# Patient Record
Sex: MALE | Race: WHITE | NOT HISPANIC OR LATINO | Employment: UNEMPLOYED | URBAN - METROPOLITAN AREA
[De-identification: names, ages, dates, MRNs, and addresses within clinical notes are randomized per-mention and may not be internally consistent; named-entity substitution may affect disease eponyms.]

---

## 2018-10-04 ENCOUNTER — HOSPITAL ENCOUNTER (OUTPATIENT)
Dept: RADIOLOGY | Facility: MEDICAL CENTER | Age: 48
End: 2018-10-04
Attending: SURGERY
Payer: COMMERCIAL

## 2018-10-04 DIAGNOSIS — C18.9 MALIGNANT NEOPLASM OF COLON, UNSPECIFIED PART OF COLON (HCC): ICD-10-CM

## 2018-10-04 PROCEDURE — 72197 MRI PELVIS W/O & W/DYE: CPT

## 2018-10-04 PROCEDURE — 700117 HCHG RX CONTRAST REV CODE 255: Performed by: SURGERY

## 2018-10-04 PROCEDURE — A9585 GADOBUTROL INJECTION: HCPCS | Performed by: SURGERY

## 2018-10-04 RX ORDER — GADOBUTROL 604.72 MG/ML
7.5 INJECTION INTRAVENOUS ONCE
Status: COMPLETED | OUTPATIENT
Start: 2018-10-04 | End: 2018-10-04

## 2018-10-04 RX ADMIN — GADOBUTROL 7.5 ML: 604.72 INJECTION INTRAVENOUS at 14:04

## 2019-07-07 ENCOUNTER — HOSPITAL ENCOUNTER (INPATIENT)
Facility: MEDICAL CENTER | Age: 49
LOS: 3 days | DRG: 390 | End: 2019-07-10
Attending: EMERGENCY MEDICINE | Admitting: HOSPITALIST
Payer: COMMERCIAL

## 2019-07-07 ENCOUNTER — HOSPITAL ENCOUNTER (OUTPATIENT)
Dept: RADIOLOGY | Facility: MEDICAL CENTER | Age: 49
End: 2019-07-07

## 2019-07-07 DIAGNOSIS — Z85.038 HISTORY OF COLON CANCER: ICD-10-CM

## 2019-07-07 DIAGNOSIS — Z93.2 ILEOSTOMY IN PLACE (HCC): ICD-10-CM

## 2019-07-07 DIAGNOSIS — R18.8 ABDOMINAL FLUID COLLECTION: ICD-10-CM

## 2019-07-07 DIAGNOSIS — K56.609 SBO (SMALL BOWEL OBSTRUCTION) (HCC): ICD-10-CM

## 2019-07-07 PROBLEM — E87.6 HYPOKALEMIA: Status: ACTIVE | Noted: 2019-07-07

## 2019-07-07 PROBLEM — D72.829 LEUKOCYTOSIS: Status: ACTIVE | Noted: 2019-07-07

## 2019-07-07 LAB
ALBUMIN SERPL BCP-MCNC: 3.7 G/DL (ref 3.2–4.9)
ALBUMIN/GLOB SERPL: 1.4 G/DL
ALP SERPL-CCNC: 115 U/L (ref 30–99)
ALT SERPL-CCNC: 61 U/L (ref 2–50)
ANION GAP SERPL CALC-SCNC: 6 MMOL/L (ref 0–11.9)
AST SERPL-CCNC: 46 U/L (ref 12–45)
BASOPHILS # BLD AUTO: 0.4 % (ref 0–1.8)
BASOPHILS # BLD: 0.05 K/UL (ref 0–0.12)
BILIRUB SERPL-MCNC: 1.7 MG/DL (ref 0.1–1.5)
BUN SERPL-MCNC: 11 MG/DL (ref 8–22)
CALCIUM SERPL-MCNC: 9 MG/DL (ref 8.5–10.5)
CHLORIDE SERPL-SCNC: 105 MMOL/L (ref 96–112)
CO2 SERPL-SCNC: 25 MMOL/L (ref 20–33)
CREAT SERPL-MCNC: 0.84 MG/DL (ref 0.5–1.4)
EKG IMPRESSION: NORMAL
EOSINOPHIL # BLD AUTO: 0.1 K/UL (ref 0–0.51)
EOSINOPHIL NFR BLD: 0.8 % (ref 0–6.9)
ERYTHROCYTE [DISTWIDTH] IN BLOOD BY AUTOMATED COUNT: 44.1 FL (ref 35.9–50)
GLOBULIN SER CALC-MCNC: 2.7 G/DL (ref 1.9–3.5)
GLUCOSE SERPL-MCNC: 108 MG/DL (ref 65–99)
HCT VFR BLD AUTO: 44.2 % (ref 42–52)
HGB BLD-MCNC: 14.8 G/DL (ref 14–18)
IMM GRANULOCYTES # BLD AUTO: 0.03 K/UL (ref 0–0.11)
IMM GRANULOCYTES NFR BLD AUTO: 0.2 % (ref 0–0.9)
LACTATE BLD-SCNC: 1.2 MMOL/L (ref 0.5–2)
LIPASE SERPL-CCNC: 42 U/L (ref 11–82)
LYMPHOCYTES # BLD AUTO: 0.96 K/UL (ref 1–4.8)
LYMPHOCYTES NFR BLD: 8 % (ref 22–41)
MAGNESIUM SERPL-MCNC: 1.9 MG/DL (ref 1.5–2.5)
MCH RBC QN AUTO: 30.1 PG (ref 27–33)
MCHC RBC AUTO-ENTMCNC: 33.5 G/DL (ref 33.7–35.3)
MCV RBC AUTO: 89.8 FL (ref 81.4–97.8)
MONOCYTES # BLD AUTO: 0.65 K/UL (ref 0–0.85)
MONOCYTES NFR BLD AUTO: 5.4 % (ref 0–13.4)
NEUTROPHILS # BLD AUTO: 10.28 K/UL (ref 1.82–7.42)
NEUTROPHILS NFR BLD: 85.2 % (ref 44–72)
NRBC # BLD AUTO: 0 K/UL
NRBC BLD-RTO: 0 /100 WBC
PLATELET # BLD AUTO: 249 K/UL (ref 164–446)
PMV BLD AUTO: 9 FL (ref 9–12.9)
POTASSIUM SERPL-SCNC: 4.1 MMOL/L (ref 3.6–5.5)
PROT SERPL-MCNC: 6.4 G/DL (ref 6–8.2)
RBC # BLD AUTO: 4.92 M/UL (ref 4.7–6.1)
SODIUM SERPL-SCNC: 136 MMOL/L (ref 135–145)
WBC # BLD AUTO: 12.1 K/UL (ref 4.8–10.8)

## 2019-07-07 PROCEDURE — 700111 HCHG RX REV CODE 636 W/ 250 OVERRIDE (IP): Performed by: INTERNAL MEDICINE

## 2019-07-07 PROCEDURE — 304538 HCHG NG TUBE

## 2019-07-07 PROCEDURE — 93005 ELECTROCARDIOGRAM TRACING: CPT | Performed by: INTERNAL MEDICINE

## 2019-07-07 PROCEDURE — 83735 ASSAY OF MAGNESIUM: CPT

## 2019-07-07 PROCEDURE — 700105 HCHG RX REV CODE 258: Performed by: EMERGENCY MEDICINE

## 2019-07-07 PROCEDURE — 93010 ELECTROCARDIOGRAM REPORT: CPT | Mod: GZ | Performed by: INTERNAL MEDICINE

## 2019-07-07 PROCEDURE — 700111 HCHG RX REV CODE 636 W/ 250 OVERRIDE (IP): Performed by: STUDENT IN AN ORGANIZED HEALTH CARE EDUCATION/TRAINING PROGRAM

## 2019-07-07 PROCEDURE — 700105 HCHG RX REV CODE 258: Performed by: INTERNAL MEDICINE

## 2019-07-07 PROCEDURE — 36415 COLL VENOUS BLD VENIPUNCTURE: CPT

## 2019-07-07 PROCEDURE — 85025 COMPLETE CBC W/AUTO DIFF WBC: CPT

## 2019-07-07 PROCEDURE — 700101 HCHG RX REV CODE 250: Performed by: STUDENT IN AN ORGANIZED HEALTH CARE EDUCATION/TRAINING PROGRAM

## 2019-07-07 PROCEDURE — 80053 COMPREHEN METABOLIC PANEL: CPT

## 2019-07-07 PROCEDURE — 83605 ASSAY OF LACTIC ACID: CPT

## 2019-07-07 PROCEDURE — 83690 ASSAY OF LIPASE: CPT

## 2019-07-07 PROCEDURE — 700101 HCHG RX REV CODE 250: Performed by: INTERNAL MEDICINE

## 2019-07-07 PROCEDURE — 99285 EMERGENCY DEPT VISIT HI MDM: CPT

## 2019-07-07 PROCEDURE — 770006 HCHG ROOM/CARE - MED/SURG/GYN SEMI*

## 2019-07-07 PROCEDURE — 700111 HCHG RX REV CODE 636 W/ 250 OVERRIDE (IP): Performed by: EMERGENCY MEDICINE

## 2019-07-07 PROCEDURE — 99223 1ST HOSP IP/OBS HIGH 75: CPT | Mod: GC | Performed by: HOSPITALIST

## 2019-07-07 PROCEDURE — 96374 THER/PROPH/DIAG INJ IV PUSH: CPT

## 2019-07-07 RX ORDER — SODIUM CHLORIDE 9 MG/ML
1000 INJECTION, SOLUTION INTRAVENOUS ONCE
Status: COMPLETED | OUTPATIENT
Start: 2019-07-07 | End: 2019-07-07

## 2019-07-07 RX ORDER — POLYETHYLENE GLYCOL 3350 17 G/17G
1 POWDER, FOR SOLUTION ORAL
Status: DISCONTINUED | OUTPATIENT
Start: 2019-07-07 | End: 2019-07-07

## 2019-07-07 RX ORDER — SODIUM CHLORIDE AND POTASSIUM CHLORIDE 150; 900 MG/100ML; MG/100ML
INJECTION, SOLUTION INTRAVENOUS CONTINUOUS
Status: DISCONTINUED | OUTPATIENT
Start: 2019-07-07 | End: 2019-07-08

## 2019-07-07 RX ORDER — MORPHINE SULFATE 4 MG/ML
3 INJECTION, SOLUTION INTRAMUSCULAR; INTRAVENOUS EVERY 4 HOURS PRN
Status: DISCONTINUED | OUTPATIENT
Start: 2019-07-07 | End: 2019-07-07

## 2019-07-07 RX ORDER — AMOXICILLIN 250 MG
2 CAPSULE ORAL 2 TIMES DAILY
Status: DISCONTINUED | OUTPATIENT
Start: 2019-07-07 | End: 2019-07-07

## 2019-07-07 RX ORDER — ACETAMINOPHEN 500 MG
1000 TABLET ORAL
Status: ON HOLD | COMMUNITY
End: 2019-07-10

## 2019-07-07 RX ORDER — MORPHINE SULFATE 4 MG/ML
3 INJECTION, SOLUTION INTRAMUSCULAR; INTRAVENOUS
Status: DISCONTINUED | OUTPATIENT
Start: 2019-07-07 | End: 2019-07-10 | Stop reason: HOSPADM

## 2019-07-07 RX ORDER — METOCLOPRAMIDE HYDROCHLORIDE 5 MG/ML
10 INJECTION INTRAMUSCULAR; INTRAVENOUS ONCE
Status: COMPLETED | OUTPATIENT
Start: 2019-07-07 | End: 2019-07-07

## 2019-07-07 RX ORDER — GABAPENTIN 300 MG/1
300 CAPSULE ORAL 3 TIMES DAILY
COMMUNITY
End: 2019-07-07

## 2019-07-07 RX ORDER — BISACODYL 10 MG
10 SUPPOSITORY, RECTAL RECTAL
Status: DISCONTINUED | OUTPATIENT
Start: 2019-07-07 | End: 2019-07-07

## 2019-07-07 RX ORDER — MORPHINE SULFATE 4 MG/ML
4 INJECTION, SOLUTION INTRAMUSCULAR; INTRAVENOUS ONCE
Status: COMPLETED | OUTPATIENT
Start: 2019-07-07 | End: 2019-07-07

## 2019-07-07 RX ORDER — ACETAMINOPHEN 325 MG/1
650 TABLET ORAL EVERY 6 HOURS PRN
Status: DISCONTINUED | OUTPATIENT
Start: 2019-07-07 | End: 2019-07-10 | Stop reason: HOSPADM

## 2019-07-07 RX ORDER — HEPARIN SODIUM 5000 [USP'U]/ML
5000 INJECTION, SOLUTION INTRAVENOUS; SUBCUTANEOUS EVERY 8 HOURS
Status: DISCONTINUED | OUTPATIENT
Start: 2019-07-07 | End: 2019-07-08

## 2019-07-07 RX ORDER — ONDANSETRON 2 MG/ML
4 INJECTION INTRAMUSCULAR; INTRAVENOUS EVERY 4 HOURS PRN
Status: DISCONTINUED | OUTPATIENT
Start: 2019-07-07 | End: 2019-07-10 | Stop reason: HOSPADM

## 2019-07-07 RX ADMIN — METRONIDAZOLE 500 MG: 500 INJECTION, SOLUTION INTRAVENOUS at 21:34

## 2019-07-07 RX ADMIN — ONDANSETRON 4 MG: 2 INJECTION INTRAMUSCULAR; INTRAVENOUS at 16:03

## 2019-07-07 RX ADMIN — POTASSIUM CHLORIDE AND SODIUM CHLORIDE: 900; 150 INJECTION, SOLUTION INTRAVENOUS at 23:06

## 2019-07-07 RX ADMIN — MORPHINE SULFATE 3 MG: 4 INJECTION INTRAVENOUS at 15:22

## 2019-07-07 RX ADMIN — CEFTRIAXONE SODIUM 2 G: 2 INJECTION, POWDER, FOR SOLUTION INTRAMUSCULAR; INTRAVENOUS at 13:55

## 2019-07-07 RX ADMIN — HEPARIN SODIUM 5000 UNITS: 5000 INJECTION, SOLUTION INTRAVENOUS; SUBCUTANEOUS at 13:55

## 2019-07-07 RX ADMIN — MORPHINE SULFATE 3 MG: 4 INJECTION INTRAVENOUS at 21:31

## 2019-07-07 RX ADMIN — MORPHINE SULFATE 3 MG: 4 INJECTION INTRAVENOUS at 12:22

## 2019-07-07 RX ADMIN — SODIUM CHLORIDE 1000 ML: 9 INJECTION, SOLUTION INTRAVENOUS at 08:21

## 2019-07-07 RX ADMIN — METRONIDAZOLE 500 MG: 500 INJECTION, SOLUTION INTRAVENOUS at 14:36

## 2019-07-07 RX ADMIN — METOCLOPRAMIDE 10 MG: 5 INJECTION, SOLUTION INTRAMUSCULAR; INTRAVENOUS at 23:06

## 2019-07-07 RX ADMIN — POTASSIUM CHLORIDE AND SODIUM CHLORIDE: 900; 150 INJECTION, SOLUTION INTRAVENOUS at 11:42

## 2019-07-07 RX ADMIN — MORPHINE SULFATE 4 MG: 4 INJECTION INTRAVENOUS at 08:09

## 2019-07-07 RX ADMIN — HEPARIN SODIUM 5000 UNITS: 5000 INJECTION, SOLUTION INTRAVENOUS; SUBCUTANEOUS at 21:34

## 2019-07-07 ASSESSMENT — PATIENT HEALTH QUESTIONNAIRE - PHQ9
2. FEELING DOWN, DEPRESSED, IRRITABLE, OR HOPELESS: NOT AT ALL
1. LITTLE INTEREST OR PLEASURE IN DOING THINGS: NOT AT ALL
SUM OF ALL RESPONSES TO PHQ9 QUESTIONS 1 AND 2: 0

## 2019-07-07 ASSESSMENT — LIFESTYLE VARIABLES: DO YOU DRINK ALCOHOL: NO

## 2019-07-07 ASSESSMENT — COGNITIVE AND FUNCTIONAL STATUS - GENERAL
SUGGESTED CMS G CODE MODIFIER DAILY ACTIVITY: CH
SUGGESTED CMS G CODE MODIFIER MOBILITY: CH
DAILY ACTIVITIY SCORE: 24
MOBILITY SCORE: 24

## 2019-07-07 ASSESSMENT — ENCOUNTER SYMPTOMS
VOMITING: 1
BLURRED VISION: 0
PSYCHIATRIC NEGATIVE: 1
CHILLS: 0
DOUBLE VISION: 0
COUGH: 0
SPUTUM PRODUCTION: 0
FEVER: 0
ORTHOPNEA: 0
TINGLING: 0
PHOTOPHOBIA: 0
HEADACHES: 0
BLOOD IN STOOL: 0
SHORTNESS OF BREATH: 0
PALPITATIONS: 0
ABDOMINAL PAIN: 1
DIARRHEA: 0
DIAPHORESIS: 1
WEIGHT LOSS: 0
HEARTBURN: 0
MYALGIAS: 0
DIZZINESS: 0
CHILLS: 1
NAUSEA: 1
BACK PAIN: 0

## 2019-07-07 NOTE — PROGRESS NOTES
2 RN Skin Check with DAVIDSON Yanez.   Ileostomy site intact, securement device in place-no signs of skin breakdown underneath.   Heels are dry, closed, blanchable.   Respiratory site inspection negative for skin breakdown or redness.  NG tube in place to R nare, secured with tape. No signs of skin breakdown.   Pt moves self independently in bed, independent at baseline.

## 2019-07-07 NOTE — CARE PLAN
Problem: Safety  Goal: Will remain free from injury  Outcome: PROGRESSING AS EXPECTED  Pt steady on his feet, in a lot of pain and receiving pain medications. Standby assist oob. NG tube in place and IVF infusing.      Problem: Bowel/Gastric:  Goal: Normal bowel function is maintained or improved  Outcome: PROGRESSING SLOWER THAN EXPECTED  Pt has NG tube in place for partial SBO. NPO at this time.

## 2019-07-07 NOTE — ED NOTES
The Medication Reconciliation process has been completed by interviewing the patient    Allergies have been reviewed  Antibiotic use in 30 days - none    [facility]  [facility]

## 2019-07-07 NOTE — ED TRIAGE NOTES
Chief Complaint   Patient presents with   • Abdominal Pain     Transfer from [facility] with diagosis of small bowel obstruction

## 2019-07-07 NOTE — PROGRESS NOTES
Received report from DAVIDSON Jewell. Pt settled into room, VSS. Pt c/o severe abd pain. IM [facility] team called for pain medications, awaiting orders. Pt started on NS with KCL 20 meq at 83 mL/hr. Ng tube to LWS with slight bile colored minimal output in line. Pt refusing assessmend and skin assessment at this time until pain controlled. Bed in lowest locked position, fall precautions in place, call bell within reach and used properly.

## 2019-07-07 NOTE — H&P
Internal Medicine Admitting History and Physical    Note Author: Kirti Wright M.D.       Name Beau Mcintyre 1970   Age/Sex 48 y.o. male   MRN [id number]   Code Status FULL     After 5PM or if no immediate response to page, please call for cross-coverage  Attending/Team: Dr. Vera/ALYSIA See Patient List for primary contact information  Call [phone] to page    1st Call - Day Intern (R1):   Dr. Wright 2nd Call - Day Sr. Resident (R2/R3):   LISSETTE Boone       Chief Complaint:   Abdominal pain    HPI:  Mr. Mcintyre is a 49 yo M with PMH of Colorectal carcinoma s/p Complete Colectomy with Ileostomy. On , patient noticed that his Ileostomy bag was not filling up (Normally the bag gets full in 2-3 hours, but it did not fill up at all in 10 hours). By evening he developed abdominal pain at the level of umbilicus in left lower and right lower quadrants. The pain was 10/10 in intensity, started as dull and became sharp, consistently present. It did not radiate. It was associated with severe bloating( the patient said his belly swelled up a lot). It was also associated with multiple episodes of vomiting- clear yellow liquid, no hematemesis, blood clots.  In the night, the patient also developed chills and diaphoresis. Of note, patient had been advised against consumption of almonds, but forgot and consumed some.  Patient went to [facility] where his white count was found to be 15.3, his K of 3, Anion gap 19, mildly elevated LFTs. His CT abdo and pelvis showed Small bowel obstruction with transition point in te presacral region, where there is eccentric small bowel thickening and intense contrast enhancement, immediately anterior to a thick wall and intensely peripherally contrast-enhancing fluid collection, possibly abscess or necrotic tumor, which itself is immediately anterior to the sacrum at S2, S3, S4. Patient was given morphine which brought his pain down to 6-7/10 and one dose of IV  Zosyn. Patient was referred to [facility] for further management.    Review of Systems   Constitutional: Positive for chills, diaphoresis and malaise/fatigue. Negative for weight loss.   HENT: Negative for ear pain, hearing loss and tinnitus.    Eyes: Negative for blurred vision, double vision and photophobia.   Respiratory: Negative for cough, sputum production and shortness of breath.    Cardiovascular: Negative for chest pain, palpitations, orthopnea and leg swelling.   Gastrointestinal: Positive for abdominal pain, nausea and vomiting. Negative for diarrhea and heartburn.   Genitourinary: Negative for dysuria, frequency and urgency.   Musculoskeletal: Negative for myalgias.   Neurological: Negative for dizziness, tingling and headaches.   Psychiatric/Behavioral: Negative.              Past Medical History (Chronic medical problem, known complications and current treatment)      Past Surgical History:  Past Surgical History:   Procedure Laterality Date   • HYDROCELECTOMY ADULT     • OTHER ABDOMINAL SURGERY      Complete colectomy       Current Outpatient Medications:  Home Medications     Reviewed by Kathleen Moss (Pharmacy Tech) on 07/07/19 at 0838  Med List Status: Complete   Medication Last Dose Status   acetaminophen (TYLENOL) 500 MG Tab 7/6/2019 Active                Medication Allergy/Sensitivities:  Allergies   Allergen Reactions   • Hydrocodone Hives     Hives - all over body   • Tape Rash     Rash and blistering with prolonged exposure  Some tape causes immediate rash, burning         Family History (mandatory)   Family History   Problem Relation Age of Onset   • Colon Cancer Mother    • Cancer Mother         Hepatocellular cancer       Social History (mandatory)   Social History     Social History   • Marital status: Single     Spouse name: N/A   • Number of children: N/A   • Years of education: N/A     Occupational History   • Not on file.     Social History Main Topics   • Smoking status: Former  Smoker     Packs/day: 1.00     Types: Cigarettes     Quit date: 7/7/1994   • Smokeless tobacco: Never Used   • Alcohol use Not on file   • Drug use: Unknown   • Sexual activity: Not on file     Other Topics Concern   • Not on file     Social History Narrative   • No narrative on file     PCP : Pcp Pt States None    Physical Exam     Vitals:    07/07/19 1000 07/07/19 1140 07/07/19 1745 07/07/19 2015   BP:  115/72 130/81 106/75   Pulse: 82 87 80 95   Resp: 12 17 17 16   Temp:  37.2 °C (99 °F) 37.1 °C (98.8 °F) 36.7 °C (98.1 °F)   TempSrc:  Temporal Temporal Temporal   SpO2: 91% 93% 93% 99%   Weight:       Height:         Body mass index is 27.67 kg/m².  /75   Pulse 95   Temp 36.7 °C (98.1 °F) (Temporal)   Resp 16   Ht 1.829 m (6')   Wt 92.5 kg (204 lb)   SpO2 99%   BMI 27.67 kg/m²   O2 therapy: Pulse Oximetry: 99 %, O2 (LPM): 0, O2 Delivery: None (Room Air)    Physical Exam   Constitutional: He is oriented to person, place, and time. He appears distressed.   HENT:   Head: Normocephalic and atraumatic.   Eyes: Pupils are equal, round, and reactive to light. Conjunctivae and EOM are normal. No scleral icterus.   Neck: Normal range of motion. Neck supple. No JVD present. No tracheal deviation present. No thyromegaly present.   Cardiovascular: Normal rate, regular rhythm, normal heart sounds and intact distal pulses.  Exam reveals no gallop and no friction rub.    No murmur heard.  Pulmonary/Chest: Effort normal and breath sounds normal. No respiratory distress. He has no wheezes. He has no rales.   Abdominal: Soft. He exhibits distension. He exhibits no mass. There is no tenderness. There is no rebound and no guarding.   Bowel sounds are hypoactive   Musculoskeletal: Normal range of motion. He exhibits no edema.   Neurological: He is alert and oriented to person, place, and time.   Skin: Skin is warm.   Psychiatric: Mood and affect normal.         Data Review       Old Records Request:   Deferred  Current  Records review/summary: Completed    Lab Data Review:  Recent Results (from the past 24 hour(s))   CBC WITH DIFFERENTIAL    Collection Time: 07/07/19  7:15 AM   Result Value Ref Range    WBC 12.1 (H) 4.8 - 10.8 K/uL    RBC 4.92 4.70 - 6.10 M/uL    Hemoglobin 14.8 14.0 - 18.0 g/dL    Hematocrit 44.2 42.0 - 52.0 %    MCV 89.8 81.4 - 97.8 fL    MCH 30.1 27.0 - 33.0 pg    MCHC 33.5 (L) 33.7 - 35.3 g/dL    RDW 44.1 35.9 - 50.0 fL    Platelet Count 249 164 - 446 K/uL    MPV 9.0 9.0 - 12.9 fL    Neutrophils-Polys 85.20 (H) 44.00 - 72.00 %    Lymphocytes 8.00 (L) 22.00 - 41.00 %    Monocytes 5.40 0.00 - 13.40 %    Eosinophils 0.80 0.00 - 6.90 %    Basophils 0.40 0.00 - 1.80 %    Immature Granulocytes 0.20 0.00 - 0.90 %    Nucleated RBC 0.00 /100 WBC    Neutrophils (Absolute) 10.28 (H) 1.82 - 7.42 K/uL    Lymphs (Absolute) 0.96 (L) 1.00 - 4.80 K/uL    Monos (Absolute) 0.65 0.00 - 0.85 K/uL    Eos (Absolute) 0.10 0.00 - 0.51 K/uL    Baso (Absolute) 0.05 0.00 - 0.12 K/uL    Immature Granulocytes (abs) 0.03 0.00 - 0.11 K/uL    NRBC (Absolute) 0.00 K/uL   COMP METABOLIC PANEL    Collection Time: 07/07/19  7:15 AM   Result Value Ref Range    Sodium 136 135 - 145 mmol/L    Potassium 4.1 3.6 - 5.5 mmol/L    Chloride 105 96 - 112 mmol/L    Co2 25 20 - 33 mmol/L    Anion Gap 6.0 0.0 - 11.9    Glucose 108 (H) 65 - 99 mg/dL    Bun 11 8 - 22 mg/dL    Creatinine 0.84 0.50 - 1.40 mg/dL    Calcium 9.0 8.5 - 10.5 mg/dL    AST(SGOT) 46 (H) 12 - 45 U/L    ALT(SGPT) 61 (H) 2 - 50 U/L    Alkaline Phosphatase 115 (H) 30 - 99 U/L    Total Bilirubin 1.7 (H) 0.1 - 1.5 mg/dL    Albumin 3.7 3.2 - 4.9 g/dL    Total Protein 6.4 6.0 - 8.2 g/dL    Globulin 2.7 1.9 - 3.5 g/dL    A-G Ratio 1.4 g/dL   LIPASE    Collection Time: 07/07/19  7:15 AM   Result Value Ref Range    Lipase 42 11 - 82 U/L   ESTIMATED GFR    Collection Time: 07/07/19  7:15 AM   Result Value Ref Range    GFR If African American >60 >60 mL/min/1.73 m 2    GFR If Non African American >60  >60 mL/min/1.73 m 2   MAGNESIUM    Collection Time: 19  7:15 AM   Result Value Ref Range    Magnesium 1.9 1.5 - 2.5 mg/dL   LACTIC ACID    Collection Time: 19 11:33 AM   Result Value Ref Range    Lactic Acid 1.2 0.5 - 2.0 mmol/L   EKG    Collection Time: 19  2:47 PM   Result Value Ref Range    Report       [facility]    Test Date:  2019  Pt Name:    ASTON MARTINEZ               Department: 141  MRN:        [id number]                      Room:       T4  Gender:     Male                         Technician: MAITE  :        1970                   Requested By:ZAHRA PEGUERO  Order #:    [id number]                    Reading MD: Beau Zimmerman MD    Measurements  Intervals                                Axis  Rate:       96                           P:          49  GA:         149                          QRS:        53  QRSD:       87                           T:          62  QT:         333  QTc:        421    Interpretive Statements  SINUS RHYTHM  No previous ECG available for comparison    Electronically Signed On 2019 16:27:19 PDT by Beau Zimmerman MD         Imaging/Procedures Review:    Independant Imaging Review: Completed  OUTSIDE IMAGES-CT ABDOMEN /PELVIS   Final Result       Small bowel obstruction with transition point in te presacral region, where there is eccentric small bowel thickening and intense contrast enhancement, immediately anterior to a thick wall and intensely peripherally contrast-enhancing fluid collection, possibly abscess or necrotic tumor, which itself is immediately anterior to the sacrum at S2, S3, S4.       EKG:   EKG Independent Review: Completed  QTc:412, HR: 96, Normal Sinus Rhythm, no ST/T changes    Records reviewed and summarized in current documentation :  Yes  [facility] teaching service handout given to patient:  No         Assessment/Plan     Small bowel obstruction (HCC)   Assessment & Plan    2/2 ? Adhesions. Other causes could be Foreign body  obstruction 2/2 almonds, or an infectious etiology leading to inflammatory changes.  Patient presented with acute abdomen, nil drainage in ileostomy bag, vomiting and Leukocytosis with neutrophilic preponderance. His CT abdomen showed small bowel obstruction. His lactic acid was normal at 1.2, indicating that intestinal ischemic changes are less likely.    Plan:  -NPO  -NG tube for decompression  -Hydration and electrolyte repletion with NS 0.9% with KCl 20MEq  -IV Zofran prn for nausea  -IV morphine 3 mg q2h PRN for pain  -IV ceftriaxone-flagyl for possible infectious cause  -Surgical consult: Non operative management. Dr. Garcia to see the patient tomorrow.  -Will call surgery if patient deteriorates             Leukocytosis   Assessment & Plan    WBC count was 15 at [facility]. In the Ed, it is 12.1 with ANC 10.28. An infectious etiology for the SBO cannot be ruled out. However, patient did not fulfill SIRS criteria in ER.      -Patient received 1 dose Zosyn before transfer  -Started Ceftriaxone-flagyl on admission       Hypokalemia   Assessment & Plan    Patient had a K of 3 at [facility] likely 2/2 excessive vomiting and poor oral intake.He was given zofran and NS.  On admission, his K is 4.1.    Plan- On NS 0.9% with KCl 20MEq  Will continue to monitor and replete K if needed.         Anticipated Hospital stay:  >2 midnights        Quality Measures  Quality-Core Measures   Reviewed items::  EKG reviewed, Labs reviewed, Medications reviewed and Radiology images reviewed  Olivas catheter::  No Olivas  DVT prophylaxis pharmacological::  Heparin  Ulcer Prophylaxis::  No    PCP: Pcp Pt States None

## 2019-07-07 NOTE — ED PROVIDER NOTES
ED Provider Note    ED Provider Note    Primary care provider: Pcp Pt States None  Means of arrival: EMS, transfer  History obtained from: patient  History limited by: None    CHIEF COMPLAINT  Chief Complaint   Patient presents with   • Abdominal Pain     Transfer from [facility] with diagosis of small bowel obstruction       HPI  Beau Mcintyre is a 48 y.o. male who presents to the Emergency Department via transfer from [place] with abdominal pain.  Work-up at outlying facility reveals a small bowel obstruction.  She has a long history of dealing with intestinal cancers.  First diagnosed in 2003 with colorectal cancer and reports that surgical resection at that time.  Most recent surgery was in 2015, Dr. Rita Acosta, removed his large colon and he now has an ileostomy.  He states that on Saturday, he did not experience any pain but noted decreased output from his ileostomy.  Sunday morning, he woke up with pain.  He does not report a fever.  It feels similar to symptoms he had in the past related to small bowel obstructions.  Outlying facility, patient was treated with pain medication Zofran and Zosyn.  He was noted to have an elevated white blood cell count of 15.  H&H 16 and 50.  Normal platelet count.  Chemistry is significant for a low potassium 3.0.  Anion gap 19 with glucose of 126.  AST and ALT 71 and 94 respectively.  Alkaline phosphatase was elevated at 167.  Urine was unrevealing.  CT scan of the abdomen and pelvis with contrast shows a small bowel obstruction with a transition point in the presacral region, where there is eccentric small bowel wall thickening and intense contrast enhancement.  Anterior to this is a thick walled contrast-enhancing fluid collection concerning for possible abscess or necrotic tumor.    REVIEW OF SYSTEMS  Review of Systems   Constitutional: Positive for diaphoresis. Negative for chills and fever.   HENT: Negative for congestion.    Respiratory: Negative for  shortness of breath.    Cardiovascular: Negative for chest pain.   Gastrointestinal: Positive for abdominal pain, nausea and vomiting. Negative for blood in stool.   Genitourinary: Negative for dysuria.   Musculoskeletal: Negative for back pain.   Neurological: Negative for headaches.   All other systems reviewed and are negative.      PAST MEDICAL HISTORY   Colorectal cancer    SURGICAL HISTORY  Resection of colorectal cancer, large bowel resection.  Ileostomy    SOCIAL HISTORY  Social History   Substance Use Topics   • Smoking status: Not on file   • Smokeless tobacco: Not on file   • Alcohol use Not on file      History   Drug use: Unknown       FAMILY HISTORY  Patient reports a strong history of colorectal cancers and in family including his mother and cousins.    CURRENT MEDICATIONS  Home Medications     Reviewed by Kathleen Moss (Pharmacy Tech) on 07/07/19 at 0838  Med List Status: Complete   Medication Last Dose Status   acetaminophen (TYLENOL) 500 MG Tab 7/6/2019 Active                ALLERGIES  Allergies   Allergen Reactions   • Hydrocodone Hives     Hives - all over body   • Tape Rash     Rash and blistering with prolonged exposure  Some tape causes immediate rash, burning       PHYSICAL EXAM  VITAL SIGNS: /79   Pulse 65   Temp 37.1 °C (98.7 °F) (Temporal)   Resp 12   Ht 1.829 m (6')   Wt 92.5 kg (204 lb)   SpO2 99%   BMI 27.67 kg/m²   Vitals reviewed.  Constitutional: Patient is oriented to person, place, and time. Appears well-developed and well-nourished. No distress.    Head: Normocephalic and atraumatic.   Ears: Normal external ears bilaterally.   Mouth/Throat: Oropharynx is clear and dry.  Eyes: Conjunctivae are normal. Pupils are equal, round, and reactive to light.   Neck: Normal range of motion. Neck supple.  Cardiovascular: Normal rate, regular rhythm and normal heart sounds. Normal peripheral pulses.  Pulmonary/Chest: Effort normal and breath sounds normal. No respiratory  distress, no wheezes, rhonchi, or rales.  Abdominal: Soft. Bowel sounds are normal. There is diffuse tenderness.  Ileostomy bag right abdomen. no rebound or guarding, or peritoneal signs. No CVA tenderness.  Musculoskeletal: No edema and no tenderness.   Neurological: No focal deficits.   Skin: Skin is warm and dry. No erythema. No pallor.   Psychiatric: Patient has a normal mood and affect.     LABS  Results for orders placed or performed during the hospital encounter of 07/07/19   CBC WITH DIFFERENTIAL   Result Value Ref Range    WBC 12.1 (H) 4.8 - 10.8 K/uL    RBC 4.92 4.70 - 6.10 M/uL    Hemoglobin 14.8 14.0 - 18.0 g/dL    Hematocrit 44.2 42.0 - 52.0 %    MCV 89.8 81.4 - 97.8 fL    MCH 30.1 27.0 - 33.0 pg    MCHC 33.5 (L) 33.7 - 35.3 g/dL    RDW 44.1 35.9 - 50.0 fL    Platelet Count 249 164 - 446 K/uL    MPV 9.0 9.0 - 12.9 fL    Neutrophils-Polys 85.20 (H) 44.00 - 72.00 %    Lymphocytes 8.00 (L) 22.00 - 41.00 %    Monocytes 5.40 0.00 - 13.40 %    Eosinophils 0.80 0.00 - 6.90 %    Basophils 0.40 0.00 - 1.80 %    Immature Granulocytes 0.20 0.00 - 0.90 %    Nucleated RBC 0.00 /100 WBC    Neutrophils (Absolute) 10.28 (H) 1.82 - 7.42 K/uL    Lymphs (Absolute) 0.96 (L) 1.00 - 4.80 K/uL    Monos (Absolute) 0.65 0.00 - 0.85 K/uL    Eos (Absolute) 0.10 0.00 - 0.51 K/uL    Baso (Absolute) 0.05 0.00 - 0.12 K/uL    Immature Granulocytes (abs) 0.03 0.00 - 0.11 K/uL    NRBC (Absolute) 0.00 K/uL   COMP METABOLIC PANEL   Result Value Ref Range    Sodium 136 135 - 145 mmol/L    Potassium 4.1 3.6 - 5.5 mmol/L    Chloride 105 96 - 112 mmol/L    Co2 25 20 - 33 mmol/L    Anion Gap 6.0 0.0 - 11.9    Glucose 108 (H) 65 - 99 mg/dL    Bun 11 8 - 22 mg/dL    Creatinine 0.84 0.50 - 1.40 mg/dL    Calcium 9.0 8.5 - 10.5 mg/dL    AST(SGOT) 46 (H) 12 - 45 U/L    ALT(SGPT) 61 (H) 2 - 50 U/L    Alkaline Phosphatase 115 (H) 30 - 99 U/L    Total Bilirubin 1.7 (H) 0.1 - 1.5 mg/dL    Albumin 3.7 3.2 - 4.9 g/dL    Total Protein 6.4 6.0 - 8.2 g/dL     Globulin 2.7 1.9 - 3.5 g/dL    A-G Ratio 1.4 g/dL   LIPASE   Result Value Ref Range    Lipase 42 11 - 82 U/L   ESTIMATED GFR   Result Value Ref Range    GFR If African American >60 >60 mL/min/1.73 m 2    GFR If Non African American >60 >60 mL/min/1.73 m 2       All labs reviewed by me.    RADIOLOGY  OUTSIDE IMAGES-CT ABDOMEN /PELVIS   Final Result        The radiologist's interpretation of all radiological studies have been reviewed by me.    COURSE & MEDICAL DECISION MAKING  Pertinent Labs & Imaging studies reviewed. (See chart for details)    Obtained and reviewed past medical records.  Patient has no prior encounters in EMR.    7:19 AM - Patient seen and examined at bedside.  This is a pleasant 48-year-old male who unfortunately has a long history of dealing with colorectal cancer.  He has an ileostomy in place.  Began having pain this morning and presented to an outlying facility and was found to have a small bowel obstruction with a possible abscess or necrotic tumor.  He is transferred here for surgical evaluation and further care.      7:18 AM patient seen Dr. Garcia as recently as last summer and he is operated on him in the past.  Will page for surgical consultation.  [facility] internal medicine paged for admission.    8:29 AM discussed with Dr. Parker on-call for Dr. Garcia, who agrees with plan for admission to [facility] services [facility] and she will track down the patient.  We discussed CT findings done at outlying facility and prior history with Dr. Garcia.    8:34 AM discussed with the [facility] internal medicine resident who agrees to admit the patient to their service.  She is aware that Dr. Parker will be coming to see the patient in consultation.    Patient is admitted in stable condition.    FINAL IMPRESSION  1. SBO (small bowel obstruction) (HCC)    2. Abdominal fluid collection    3. History of colon cancer    4. Ileostomy in place (HCC)

## 2019-07-07 NOTE — PROGRESS NOTES
Pt seen and examined  Hx of colectomy for colon CA by Dr. Garcia  Now w SBO and ? Mass/fluid collection in anterior abd wall  NGT in place  Abd soft  Would treat non operatively for now  Dr. Garcia to see tomorrow

## 2019-07-07 NOTE — ED NOTES
Assumed care, report received.  Pt resting quietly on gurney.  Reports pain is manageable at this time.  Aware of POC

## 2019-07-07 NOTE — PROGRESS NOTES
Pt c/o 10/10 pain and bought of emesis, pt states it looked like feces, vomited in the sink, unable to assess. MD Wright made aware and at bedside. Morphine given again. Zofran ordered. Pt on  with adequate sats on 2L NC.

## 2019-07-08 ENCOUNTER — APPOINTMENT (OUTPATIENT)
Dept: RADIOLOGY | Facility: MEDICAL CENTER | Age: 49
DRG: 390 | End: 2019-07-08
Attending: SURGERY
Payer: COMMERCIAL

## 2019-07-08 ENCOUNTER — APPOINTMENT (OUTPATIENT)
Dept: RADIOLOGY | Facility: MEDICAL CENTER | Age: 49
DRG: 390 | End: 2019-07-08
Attending: INTERNAL MEDICINE
Payer: COMMERCIAL

## 2019-07-08 PROBLEM — C19 COLORECTAL CANCER (HCC): Status: ACTIVE | Noted: 2019-07-08

## 2019-07-08 LAB
ALBUMIN SERPL BCP-MCNC: 3.2 G/DL (ref 3.2–4.9)
ALBUMIN/GLOB SERPL: 1.2 G/DL
ALP SERPL-CCNC: 96 U/L (ref 30–99)
ALT SERPL-CCNC: 46 U/L (ref 2–50)
ANION GAP SERPL CALC-SCNC: 8 MMOL/L (ref 0–11.9)
ANION GAP SERPL CALC-SCNC: 9 MMOL/L (ref 0–11.9)
AST SERPL-CCNC: 27 U/L (ref 12–45)
BASOPHILS # BLD AUTO: 0.9 % (ref 0–1.8)
BASOPHILS # BLD: 0.06 K/UL (ref 0–0.12)
BILIRUB CONJ SERPL-MCNC: 0.8 MG/DL (ref 0.1–0.5)
BILIRUB INDIRECT SERPL-MCNC: 1.5 MG/DL (ref 0–1)
BILIRUB SERPL-MCNC: 2.3 MG/DL (ref 0.1–1.5)
BUN SERPL-MCNC: 11 MG/DL (ref 8–22)
BUN SERPL-MCNC: 11 MG/DL (ref 8–22)
CALCIUM SERPL-MCNC: 8.6 MG/DL (ref 8.5–10.5)
CALCIUM SERPL-MCNC: 8.8 MG/DL (ref 8.5–10.5)
CHLORIDE SERPL-SCNC: 105 MMOL/L (ref 96–112)
CHLORIDE SERPL-SCNC: 106 MMOL/L (ref 96–112)
CO2 SERPL-SCNC: 25 MMOL/L (ref 20–33)
CO2 SERPL-SCNC: 27 MMOL/L (ref 20–33)
CREAT SERPL-MCNC: 0.86 MG/DL (ref 0.5–1.4)
CREAT SERPL-MCNC: 0.88 MG/DL (ref 0.5–1.4)
EOSINOPHIL # BLD AUTO: 0.12 K/UL (ref 0–0.51)
EOSINOPHIL NFR BLD: 1.7 % (ref 0–6.9)
ERYTHROCYTE [DISTWIDTH] IN BLOOD BY AUTOMATED COUNT: 45.6 FL (ref 35.9–50)
GLOBULIN SER CALC-MCNC: 2.7 G/DL (ref 1.9–3.5)
GLUCOSE SERPL-MCNC: 122 MG/DL (ref 65–99)
GLUCOSE SERPL-MCNC: 98 MG/DL (ref 65–99)
HCT VFR BLD AUTO: 42 % (ref 42–52)
HGB BLD-MCNC: 14.2 G/DL (ref 14–18)
LACTATE BLD-SCNC: 1.3 MMOL/L (ref 0.5–2)
LYMPHOCYTES # BLD AUTO: 0.75 K/UL (ref 1–4.8)
LYMPHOCYTES NFR BLD: 10.4 % (ref 22–41)
MAGNESIUM SERPL-MCNC: 1.9 MG/DL (ref 1.5–2.5)
MANUAL DIFF BLD: ABNORMAL
MCH RBC QN AUTO: 31.1 PG (ref 27–33)
MCHC RBC AUTO-ENTMCNC: 33.8 G/DL (ref 33.7–35.3)
MCV RBC AUTO: 91.9 FL (ref 81.4–97.8)
METAMYELOCYTES NFR BLD MANUAL: 0.9 %
MONOCYTES # BLD AUTO: 0.94 K/UL (ref 0–0.85)
MONOCYTES NFR BLD AUTO: 13.1 % (ref 0–13.4)
MORPHOLOGY BLD-IMP: NORMAL
NEUTROPHILS # BLD AUTO: 5.26 K/UL (ref 1.82–7.42)
NEUTROPHILS NFR BLD: 61.7 % (ref 44–72)
NEUTS BAND NFR BLD MANUAL: 11.3 % (ref 0–10)
NRBC # BLD AUTO: 0 K/UL
NRBC BLD-RTO: 0 /100 WBC
PLATELET # BLD AUTO: 203 K/UL (ref 164–446)
PLATELET BLD QL SMEAR: NORMAL
PMV BLD AUTO: 9.3 FL (ref 9–12.9)
POTASSIUM SERPL-SCNC: 3.6 MMOL/L (ref 3.6–5.5)
POTASSIUM SERPL-SCNC: 3.6 MMOL/L (ref 3.6–5.5)
PROT SERPL-MCNC: 5.9 G/DL (ref 6–8.2)
RBC # BLD AUTO: 4.57 M/UL (ref 4.7–6.1)
RBC BLD AUTO: NORMAL
SODIUM SERPL-SCNC: 139 MMOL/L (ref 135–145)
SODIUM SERPL-SCNC: 141 MMOL/L (ref 135–145)
WBC # BLD AUTO: 7.2 K/UL (ref 4.8–10.8)

## 2019-07-08 PROCEDURE — 700111 HCHG RX REV CODE 636 W/ 250 OVERRIDE (IP): Performed by: INTERNAL MEDICINE

## 2019-07-08 PROCEDURE — 83605 ASSAY OF LACTIC ACID: CPT

## 2019-07-08 PROCEDURE — 700111 HCHG RX REV CODE 636 W/ 250 OVERRIDE (IP): Performed by: STUDENT IN AN ORGANIZED HEALTH CARE EDUCATION/TRAINING PROGRAM

## 2019-07-08 PROCEDURE — 770001 HCHG ROOM/CARE - MED/SURG/GYN PRIV*

## 2019-07-08 PROCEDURE — 74018 RADEX ABDOMEN 1 VIEW: CPT

## 2019-07-08 PROCEDURE — 80053 COMPREHEN METABOLIC PANEL: CPT

## 2019-07-08 PROCEDURE — 85007 BL SMEAR W/DIFF WBC COUNT: CPT

## 2019-07-08 PROCEDURE — 85027 COMPLETE CBC AUTOMATED: CPT

## 2019-07-08 PROCEDURE — 36415 COLL VENOUS BLD VENIPUNCTURE: CPT

## 2019-07-08 PROCEDURE — 700105 HCHG RX REV CODE 258

## 2019-07-08 PROCEDURE — 700101 HCHG RX REV CODE 250: Performed by: STUDENT IN AN ORGANIZED HEALTH CARE EDUCATION/TRAINING PROGRAM

## 2019-07-08 PROCEDURE — 700105 HCHG RX REV CODE 258: Performed by: INTERNAL MEDICINE

## 2019-07-08 PROCEDURE — 99233 SBSQ HOSP IP/OBS HIGH 50: CPT | Mod: GC | Performed by: HOSPITALIST

## 2019-07-08 PROCEDURE — 700101 HCHG RX REV CODE 250: Performed by: INTERNAL MEDICINE

## 2019-07-08 PROCEDURE — 82248 BILIRUBIN DIRECT: CPT

## 2019-07-08 PROCEDURE — 80048 BASIC METABOLIC PNL TOTAL CA: CPT

## 2019-07-08 PROCEDURE — 83735 ASSAY OF MAGNESIUM: CPT

## 2019-07-08 RX ORDER — SODIUM CHLORIDE 9 MG/ML
INJECTION, SOLUTION INTRAVENOUS
Status: COMPLETED
Start: 2019-07-08 | End: 2019-07-08

## 2019-07-08 RX ADMIN — CEFTRIAXONE SODIUM 2 G: 2 INJECTION, POWDER, FOR SOLUTION INTRAMUSCULAR; INTRAVENOUS at 13:06

## 2019-07-08 RX ADMIN — MORPHINE SULFATE 3 MG: 4 INJECTION INTRAVENOUS at 21:04

## 2019-07-08 RX ADMIN — MORPHINE SULFATE 3 MG: 4 INJECTION INTRAVENOUS at 17:30

## 2019-07-08 RX ADMIN — POTASSIUM CHLORIDE AND SODIUM CHLORIDE: 900; 150 INJECTION, SOLUTION INTRAVENOUS at 11:23

## 2019-07-08 RX ADMIN — MORPHINE SULFATE 2 MG: 4 INJECTION INTRAVENOUS at 13:10

## 2019-07-08 RX ADMIN — HEPARIN SODIUM 5000 UNITS: 5000 INJECTION, SOLUTION INTRAVENOUS; SUBCUTANEOUS at 06:02

## 2019-07-08 RX ADMIN — POTASSIUM CHLORIDE: 2 INJECTION, SOLUTION, CONCENTRATE INTRAVENOUS at 23:33

## 2019-07-08 RX ADMIN — SODIUM CHLORIDE 500 ML: 9 INJECTION, SOLUTION INTRAVENOUS at 21:07

## 2019-07-08 RX ADMIN — METRONIDAZOLE 500 MG: 500 INJECTION, SOLUTION INTRAVENOUS at 21:06

## 2019-07-08 RX ADMIN — METRONIDAZOLE 500 MG: 500 INJECTION, SOLUTION INTRAVENOUS at 13:36

## 2019-07-08 RX ADMIN — METRONIDAZOLE 500 MG: 500 INJECTION, SOLUTION INTRAVENOUS at 06:02

## 2019-07-08 RX ADMIN — POTASSIUM CHLORIDE: 2 INJECTION, SOLUTION, CONCENTRATE INTRAVENOUS at 16:00

## 2019-07-08 RX ADMIN — MORPHINE SULFATE 3 MG: 4 INJECTION INTRAVENOUS at 03:52

## 2019-07-08 RX ADMIN — MORPHINE SULFATE 3 MG: 4 INJECTION INTRAVENOUS at 08:03

## 2019-07-08 RX ADMIN — MORPHINE SULFATE 3 MG: 4 INJECTION INTRAVENOUS at 10:56

## 2019-07-08 ASSESSMENT — ENCOUNTER SYMPTOMS
COUGH: 0
HEMOPTYSIS: 0
PALPITATIONS: 0
VOMITING: 0
TINGLING: 0
CHILLS: 0
BLURRED VISION: 0
NAUSEA: 0
PND: 0
ABDOMINAL PAIN: 1
HEADACHES: 0
SPUTUM PRODUCTION: 0
PHOTOPHOBIA: 0
FEVER: 0
PSYCHIATRIC NEGATIVE: 1
DOUBLE VISION: 0
ORTHOPNEA: 0
DIZZINESS: 0

## 2019-07-08 NOTE — ASSESSMENT & PLAN NOTE
Likely 2/2 bowel wall thickening with fluid as seen on CT.   (Per Dr. Garcia, patient had a presacral mass/fluid collection last year which based on PET evaluation and biopsies was benign, and after discussion at tumor conference, it was decided not to intervene surgically.)  Patient is doing better clinically. His white count is 8.1. His vitals are stable.   Abdominal X ray:No abnormally distended loops of air-filled bowel bowel are evident to suggest bowel obstruction.There is no evidence of free intraperitoneal air.    Plan:  -Diet to regular diet   -discontinued NG tube, IV fluids  -Oral Cefdinir and flagyl for 2 days

## 2019-07-08 NOTE — PROGRESS NOTES
Patient with nausea and active vomiting. No PRN rescue anti-emetics in place. [facility] resident paged for orders. Awaiting call back.

## 2019-07-08 NOTE — ASSESSMENT & PLAN NOTE
Patient had a K of 3 at [facility] likely 2/2 excessive vomiting and poor oral intake.He was given zofran and NS.  His K is 3.6.    Plan- Replaced with NS 0.9% with KCl 40MEq

## 2019-07-08 NOTE — PROGRESS NOTES
Pt constantly unhooking himself to move around and not letting nurse or CNA know and not hooking himself back up. Pt educated on importance of leaving NG tube to LWS and not disconnecting himself. Pt also educated on IVF and pt educated on leaving sp02 on to track levels especially with narcotic use. Pt takes NC off at times and 02 drops to 85%. Pt is drowsy, but easily arousable and oriented. Pt request 10/10 abd pain and Morphine was obtained and then pt denied any pain and refused medication when tried to admin. Wasted in medselect with DAVIDSON Yanez.     NG tube draining moderate amount of bilious drainage.

## 2019-07-08 NOTE — CARE PLAN
Problem: Communication  Goal: The ability to communicate needs accurately and effectively will improve  Outcome: PROGRESSING AS EXPECTED  Discussed plan of care with patient, encouraged him to call with any questions or concerns regarding care.    Problem: Pain Management  Goal: Pain level will decrease to patient's comfort goal    Intervention: Follow pain managment plan developed in collaboration with patient and Interdisciplinary Team  Patient educated on pain medications, availability, and alternatives.

## 2019-07-08 NOTE — CONSULTS
7/8  ATSP by Dr Parker for SBO    HPI: 48y M here with new nausea, emesis, admitted overnight with a SBO.  He noted that his ileostomy stopped working on sat, and he developed bloating followed by emesis prompting arrival at the hospital.  He was admitted for further care and NGT was placed.  Since then he still has some nausea, NGT remains with bilious output and is being flushed.      He has a hx of rectal cancer removed years ago with APR.  He then developed a colon cancer in the residual colon and was treated by me with a Total colectomy and end ileostomy placement in 2015.  [facility], [facility]  He had a presacral mass/fluid collection last year which based on PET evaluation and biopsies was benign, and after discussion at tumor conference we decided against any surgical intervention.      PMHx: Rectal Cancer, Colon Cancer, Ileostomy    PSHx: Lap R Colectomy 2006; Abdominoperineal Resection 2006; Open Total Colectomy with End Ileostomy    Meds: see Med Rec, no anticoagulation    All: Hydrocodone, tape    FamHx: no colon/rectal cancers, no other pertinent family history    SocHx: No Tob/Drugs, occasional EtOH, former but not current smoker      ROS: negative except as above    Consitutional- above  HEENT- no visual changes, no sneezing or runny nose  Skin- no rashes or itching  Cardiovascular- no chest pain or palpatations  Respiratory- no SOB or cough  GI- above  - no dysurea  Neuro- no weakness or syncope  Musculoskeletal- no muscle or joint pain  Heme- no bleeding or bruising  Lymphatic- no enlarged nodes or previous splenectomy  Endocrine- No sweating or heat/cold intolerance  Allergy- No asthma or hives  Psychiatric- no depression or anxiety        Physical Exam:   AFVSS  A@O x3, NAD  NCAT, no scleral icterus  Neck nontender, no lymphadenopathy  Normal respiratory effort, no chest wall masses  RRR, 2+ pulses  Abdomen soft, no peritonitis, no masses, mildly distended  Healthy  ileostomy in the RLQ  Extremities warm and well perfused  No skin rashes or lesions    Labs: WBC 72, Hct 42, Plt 203  Lytes wnl    Radiology: CT A/P: (from OSH) Small bowel obstruction with transition point in te presacral region, where there is eccentric small bowel thickening and intense contrast enhancement, immediately anterior to a thick wall and intensely peripherally contrast-enhancing fluid collection, possibly abscess or necrotic tumor, which itself is immediately anterior to the sacrum at S2, S3, S4.                A/P: 48y M here with SBO which appears to be associated with the known process in the presacral region.  This has previously been evaluated with PET and biopsy and is stable over multiple years, so unlikely to represent a tumor a this time.  Recommend conservative treated of the SBO for now.  If this does not resolve may consider exploration although given his complex surgical hx this may be a very morbid procedure for him if requred.    Agree with NPO, NGT to suction, IVF, encourage ambulation  Will follow along with primary team

## 2019-07-08 NOTE — PROGRESS NOTES
Internal Medicine Interval Note  Note Author: Kirti Wright M.D.     Name Beau Mcintyre 1970   Age/Sex 48 y.o. male   MRN [id number]   Code Status FULL     After 5PM or if no immediate response to page, please call for cross-coverage  Attending/Team: Dr. Carmona/ALYSIA See Patient List for primary contact information  Call [phone] to page    1st Call - Day Intern (R1):   Dr. Wright 2nd Call - Day Sr. Resident (R2/R3):   Dr. Boone         Reason for interval visit  (Principal Problem)   Small Bowel Obstruction      Interval Problem Daily Status Update  (24 hours, problem oriented, brief subjective history, new lab/imaging data pertinent to that problem)     Patient was in a lot of pain overnight, morphine helped temporarily but then the pain returned to 10/10. He had 2 episodes of vomiting of greenish-brown liquid. His nausea was not responding to Zofran, so metoclopramide was given after which his nausea subsided.   This morning he says his pain is 6-7/10, in the LLQ. He feels better. His NGT aspirate container is filling at the rate of approximately 100cc/hr.       Review of Systems   Constitutional: Positive for malaise/fatigue. Negative for chills and fever.   HENT: Negative for ear pain, hearing loss and tinnitus.    Eyes: Negative for blurred vision, double vision and photophobia.   Respiratory: Negative for cough, hemoptysis and sputum production.    Cardiovascular: Negative for chest pain, palpitations, orthopnea, leg swelling and PND.   Gastrointestinal: Positive for abdominal pain. Negative for nausea and vomiting.   Genitourinary: Negative for dysuria, frequency and urgency.   Neurological: Negative for dizziness, tingling and headaches.   Psychiatric/Behavioral: Negative.        Disposition/Barriers to discharge:   SBO conservative management with careful monitoring    Consultants/Specialty  Dr. Garcia, General Surgery  PCP: Pcp Pt States None      Quality Measures  Quality-Core  Measures   Reviewed items::  Labs reviewed and Medications reviewed  Olivas catheter::  No Olivas  DVT prophylaxis - mechanical:  SCDs          Physical Exam       Vitals:    07/08/19 0642 07/08/19 0900 07/08/19 1200 07/08/19 1600   BP:  101/72 (!) 98/70 (!) 92/69   Pulse: 99 88 90 99   Resp:  16 18 18   Temp:  36.9 °C (98.4 °F) 37 °C (98.6 °F) 37.1 °C (98.8 °F)   TempSrc:  Temporal Temporal Temporal   SpO2:  96% 95% 91%   Weight:       Height:         Body mass index is 27.67 kg/m².    Oxygen Therapy:  Pulse Oximetry: 91 %, O2 (LPM): 2, O2 Delivery: Nasal Cannula    Physical Exam   Constitutional: He is oriented to person, place, and time and well-developed, well-nourished, and in no distress.   HENT:   Head: Normocephalic and atraumatic.   Eyes: Pupils are equal, round, and reactive to light. Conjunctivae and EOM are normal.   Neck: Normal range of motion. Neck supple. No JVD present. No tracheal deviation present. No thyromegaly present.   Cardiovascular: Normal rate, regular rhythm, normal heart sounds and intact distal pulses.    Pulmonary/Chest: Effort normal and breath sounds normal. No respiratory distress. He has no wheezes. He has no rales.   Abdominal: Soft. Bowel sounds are normal. He exhibits distension (Mild ). There is no tenderness. There is no rebound and no guarding.   Musculoskeletal: Normal range of motion. He exhibits no edema.   Neurological: He is alert and oriented to person, place, and time.   Skin: Skin is warm. He is not diaphoretic.   Psychiatric: Mood and affect normal.         Assessment/Plan     Small bowel obstruction (HCC)   Assessment & Plan    Likely 2/2 bowel wall thickening with fluid as seen on CT.   (Per Dr. Garcia, patient had a presacral mass/fluid collection last year which based on PET evaluation and biopsies was benign, and after discussion at tumor conference, it was decided not to intervene surgically.)  Patient is doing better clinically. His white count is down from 12.1  to 7.2. His vitals are stable. Abdominal X ray: Dilated small bowel loops projecting over the mid abdomen, consistent with yesterday's CT.  His BMP is stable, Magnesium 1.9, lactic acid 1.3.  Plan:  -continue serial plain abdominal X rays to monitor the obstruction   -Careful monitoring of electrolytes: Repeat BMP, Magnesium  -Repeat Lactic acid to monitor for intestinal wall ischemia  -Hydration and electrolyte repletion- LR with 20MEq KCl  -IV Zofran prn for nausea  -IV morphine 3 mg q2h PRN for pain  -IV ceftriaxone-flagyl for possible infectious cause  -Will call surgery if patient deteriorates  -Patient off the heparin, on SCD, in case he needs surgery.  -encourage ambulation           Leukocytosis   Assessment & Plan    WBC count trended down from 12.1 to 7.2.    Plan:  -Resolved  -On Day 2 Ceftriaxone-flagyl on admission       Hypokalemia   Assessment & Plan    Patient had a K of 3 at [facility] likely 2/2 excessive vomiting and poor oral intake.He was given zofran and NS.  On admission, his K is 4.1.    Plan- On NS 0.9% with KCl 20MEq  Will continue to monitor and replete K if needed.     Colorectal cancer (HCC)   Assessment & Plan    Patient underwent complete colectomy in 2015. His last chemotherapy was in 2016. He has been stable since then.

## 2019-07-08 NOTE — ASSESSMENT & PLAN NOTE
Patient underwent complete colectomy in 2015. His last chemotherapy was in 2016. He has been stable since then.

## 2019-07-08 NOTE — CARE PLAN
Problem: Safety  Goal: Will remain free from injury  Outcome: PROGRESSING AS EXPECTED  Safety precautions in place. Bed in locked/low position. 2 side rails up. Treaded socks. Call light in reach, calls appropriately. Hourly rounding practiced.    Problem: Pain Management  Goal: Pain level will decrease to patient's comfort goal  Outcome: PROGRESSING AS EXPECTED  Pain managed with Morphine PRN

## 2019-07-08 NOTE — PROGRESS NOTES
Bedside report received.  Assessment complete.  A&O x 4. Patient calls appropriately.  Patient mobilizes independently.  Patient has 9/10 pain. Medicated per mar.  Denies N&V. NPO with NG tube in place.  + void, - flatus, - BM.  Patient denies SOB.  SCD's off, patient ambulating .  Review plan with of care with patient. Call light and personal belongings with in reach. Hourly rounding in place. All needs met at this time.

## 2019-07-08 NOTE — SENIOR ADMIT NOTE
Senior Admit Note    Patient: Beau Mcintyre.  MRN: [id number].                               Chief complaint: Abdominal pain and nausea    HPI:   Mr. Mcintyre is a 48-year-old male with a past medical history of colorectal cancer diagnosed in 2003 s/p colectomy (last surgery in 2015 - by Dr. Garcia) and history of several episodes of small bowel obstruction in the past, presented with abdominal pain, decreased stool output and nausea.  He was transferred from an outside hospital in [place], where he was found to have a white cell count of 15 K and CT with contrast of the abdomen showed small bowel obstruction.  Patient was given empiric Zosyn and transferred to [facility].  Patient reports abdominal pain and nausea for the last 2 days.  Denied fevers but reports chills.     In the ED, he was afebrile and vitals were stable.  However labs showed leukocytosis of 12.1 with elevated ANC, negative lipase and normal liver enzymes.  Patient received IV Zofran and morphine for pain control and NG tube to suction.  General surgery was consulted by the ER physician, Dr. Parker saw the patient and recommended nonsurgical management for now.      Upon examination, patient had abdominal tenderness more on the left and hypoactive bowel sounds.     Problem list:   #Small bowel obstruction  #Neutrophilic leukocytosis  #History of colorectal cancer s/p colectomy -decreased stool output      Plan:   Admit to medical floor as inpatient  - N.p.o.  - NG tube to suction  - IV ceftriaxone and metronidazole  - EKG (r/o QT prolongation) followed by IV Zofran as needed for nausea  - IV morphine for pain control  - IV fluids  - If worsening abdominal pain, low threshold to call surgery      DVT prophylaxis: Heparin  Code status: Full code    For complete details, please refer to H&P by intern.     Rosemary Boone M.D.

## 2019-07-08 NOTE — CONSULTS
DATE OF SERVICE:  07/08/2019    SURGICAL CONSULTATION    REQUESTING PHYSICIAN:  Dr. Reed.    REASON FOR CONSULTATION:  The patient is a 48-year-old gentleman who has an   extensive history of having colon cancer at a very young age, having had a   colectomy and subsequently a subtotal colectomy with an ileostomy.  This was   all done by Dr. Garcia.  He had his last surgery in 2015.  He now presents   with a small-bowel obstruction, also a question of a mass or tumor in his   abdomen.    PAST MEDICAL HISTORY:  As noted above.    PAST SURGICAL HISTORY:  As noted above as well.    MEDICATIONS:  Tylenol.    ALLERGIES:  TO HYDROCODONE AND TAPE.    SOCIAL HISTORY:  He is a former smoker and stopped several years ago.  He does   not drink.    REVIEW OF SYSTEMS:  Otherwise unremarkable.    PHYSICAL EXAMINATION:  VITAL SIGNS:  Afebrile.  GENERAL:  He is anicteric.  NECK:  Supple.  ABDOMEN:  Soft with some multiple scars.  His ileostomy has no output.  It is   in the right lower quadrant.  There are no palpable masses or peritonitis.  EXTREMITIES:  No edema.  NEUROLOGIC:  Intact.    LABORATORY DATA:  White count is 12,000, hemoglobin is 14.8.  Electrolytes are   normal.  Transaminases are mildly elevated at 46 and 61.    DIAGNOSTIC DATA:  CT scan reviewed, demonstrated an evidence of a small-bowel   obstruction and a thin walled fluid collection, possible abscess or necrotic   tumor in the pelvis.    IMPRESSION:  This is a 48-year-old male status post multiple abdominal   surgeries for colorectal cancer, now with what appears to be a small-bowel   obstruction and possible intra-abdominal abscess versus recurrent tumor.    PLAN:  He will be admitted.  An NG tube has been placed.  I would like to   monitor him for this point.  As far as trying to manage him nonoperatively   with the consideration for possibly IR draining or biopsying this mass, I will   discuss this with Dr. Garcia tomorrow as he returns tomorrow and take over    care of the patient.       ____________________________________     MD FRANNY BLAKE / ANTONELLA    DD:  07/08/2019 08:24:20  DT:  07/08/2019 11:26:49    D#:  [id number]  Job#:  [id number]

## 2019-07-09 ENCOUNTER — APPOINTMENT (OUTPATIENT)
Dept: RADIOLOGY | Facility: MEDICAL CENTER | Age: 49
DRG: 390 | End: 2019-07-09
Attending: INTERNAL MEDICINE
Payer: COMMERCIAL

## 2019-07-09 LAB
ANION GAP SERPL CALC-SCNC: 10 MMOL/L (ref 0–11.9)
BUN SERPL-MCNC: 9 MG/DL (ref 8–22)
CALCIUM SERPL-MCNC: 8.8 MG/DL (ref 8.5–10.5)
CHLORIDE SERPL-SCNC: 104 MMOL/L (ref 96–112)
CO2 SERPL-SCNC: 25 MMOL/L (ref 20–33)
CREAT SERPL-MCNC: 0.76 MG/DL (ref 0.5–1.4)
ERYTHROCYTE [DISTWIDTH] IN BLOOD BY AUTOMATED COUNT: 44.6 FL (ref 35.9–50)
GLUCOSE SERPL-MCNC: 92 MG/DL (ref 65–99)
HCT VFR BLD AUTO: 37.4 % (ref 42–52)
HGB BLD-MCNC: 13 G/DL (ref 14–18)
MAGNESIUM SERPL-MCNC: 1.9 MG/DL (ref 1.5–2.5)
MCH RBC QN AUTO: 31.6 PG (ref 27–33)
MCHC RBC AUTO-ENTMCNC: 34.8 G/DL (ref 33.7–35.3)
MCV RBC AUTO: 91 FL (ref 81.4–97.8)
PLATELET # BLD AUTO: 193 K/UL (ref 164–446)
PMV BLD AUTO: 9.2 FL (ref 9–12.9)
POTASSIUM SERPL-SCNC: 3.4 MMOL/L (ref 3.6–5.5)
RBC # BLD AUTO: 4.11 M/UL (ref 4.7–6.1)
SODIUM SERPL-SCNC: 139 MMOL/L (ref 135–145)
WBC # BLD AUTO: 8.1 K/UL (ref 4.8–10.8)

## 2019-07-09 PROCEDURE — 700102 HCHG RX REV CODE 250 W/ 637 OVERRIDE(OP): Performed by: STUDENT IN AN ORGANIZED HEALTH CARE EDUCATION/TRAINING PROGRAM

## 2019-07-09 PROCEDURE — 700111 HCHG RX REV CODE 636 W/ 250 OVERRIDE (IP): Performed by: INTERNAL MEDICINE

## 2019-07-09 PROCEDURE — 74019 RADEX ABDOMEN 2 VIEWS: CPT

## 2019-07-09 PROCEDURE — 80048 BASIC METABOLIC PNL TOTAL CA: CPT

## 2019-07-09 PROCEDURE — 770001 HCHG ROOM/CARE - MED/SURG/GYN PRIV*

## 2019-07-09 PROCEDURE — 83735 ASSAY OF MAGNESIUM: CPT

## 2019-07-09 PROCEDURE — A9270 NON-COVERED ITEM OR SERVICE: HCPCS | Performed by: STUDENT IN AN ORGANIZED HEALTH CARE EDUCATION/TRAINING PROGRAM

## 2019-07-09 PROCEDURE — 700105 HCHG RX REV CODE 258: Performed by: INTERNAL MEDICINE

## 2019-07-09 PROCEDURE — 99232 SBSQ HOSP IP/OBS MODERATE 35: CPT | Mod: GC | Performed by: HOSPITALIST

## 2019-07-09 PROCEDURE — 36415 COLL VENOUS BLD VENIPUNCTURE: CPT

## 2019-07-09 PROCEDURE — 700101 HCHG RX REV CODE 250: Performed by: INTERNAL MEDICINE

## 2019-07-09 PROCEDURE — 85027 COMPLETE CBC AUTOMATED: CPT

## 2019-07-09 RX ADMIN — ACETAMINOPHEN 650 MG: 325 TABLET, FILM COATED ORAL at 14:28

## 2019-07-09 RX ADMIN — METRONIDAZOLE 500 MG: 500 INJECTION, SOLUTION INTRAVENOUS at 15:25

## 2019-07-09 RX ADMIN — POTASSIUM CHLORIDE: 2 INJECTION, SOLUTION, CONCENTRATE INTRAVENOUS at 06:22

## 2019-07-09 RX ADMIN — METRONIDAZOLE 500 MG: 500 INJECTION, SOLUTION INTRAVENOUS at 06:23

## 2019-07-09 RX ADMIN — CEFTRIAXONE SODIUM 2 G: 2 INJECTION, POWDER, FOR SOLUTION INTRAMUSCULAR; INTRAVENOUS at 14:24

## 2019-07-09 RX ADMIN — MORPHINE SULFATE 3 MG: 4 INJECTION INTRAVENOUS at 04:04

## 2019-07-09 RX ADMIN — ACETAMINOPHEN 650 MG: 325 TABLET, FILM COATED ORAL at 21:39

## 2019-07-09 RX ADMIN — METRONIDAZOLE 500 MG: 500 INJECTION, SOLUTION INTRAVENOUS at 21:40

## 2019-07-09 RX ADMIN — MORPHINE SULFATE 3 MG: 4 INJECTION INTRAVENOUS at 21:39

## 2019-07-09 RX ADMIN — MORPHINE SULFATE 3 MG: 4 INJECTION INTRAVENOUS at 00:04

## 2019-07-09 ASSESSMENT — ENCOUNTER SYMPTOMS
SPUTUM PRODUCTION: 0
DOUBLE VISION: 0
WHEEZING: 0
NAUSEA: 0
VOMITING: 0
SHORTNESS OF BREATH: 0
FEVER: 0
DIZZINESS: 0
HEADACHES: 0
BLURRED VISION: 0
HEMOPTYSIS: 0
HEARTBURN: 0
ABDOMINAL PAIN: 1
CHILLS: 0
PHOTOPHOBIA: 0
PALPITATIONS: 0
TINGLING: 0
MYALGIAS: 0
ORTHOPNEA: 0
COUGH: 0
PSYCHIATRIC NEGATIVE: 1

## 2019-07-09 NOTE — PROGRESS NOTES
Patient tolerated diet tray of full liquid delivered this morning.   Denies nausea.  Encouraged to ambulate around.

## 2019-07-09 NOTE — DISCHARGE SUMMARY
Internal Medicine Discharge Summary  Note Author: Rosemary Boone M.D.       Name Beau Mcintyre 1970   Age/Sex 48 y.o. male   MRN [id number]         Admit Date:  2019       Discharge Date:   7/10/2019    Service:   [facility] Internal Medicine Red Team  Attending Physician(s):   Dr. Carmona        Senior Resident(s):   Dr. Boone   [place] Resident(s):   Dr. Wright  PCP: Pcp Pt States None      Primary Diagnosis:         Secondary Diagnoses:                Active Problems:    Small bowel obstruction (HCC) POA: Unknown    Hypokalemia POA: Unknown    Leukocytosis POA: Unknown  Resolved Problems:    Colorectal cancer (HCC) POA: Unknown      Hospital Summary (Brief Narrative):         Mr. Mcintyre is a 75-year-old male with a past medical history of colorectal cancer diagnosed in  s/p colectomy (last surgery in -by Dr. Garcia) and history of several episodes of SBO in the past, who was transferred to [facility] from an outside hospital in [place], where he presented with nausea, abdominal pain, chills and decreased stool output in the colostomy bag.  He also had an elevated WBC count of 15 K and CT with contrast of the abdomen at the outside hospital showed small bowel obstruction. He was started on empiric Zosyn and transferred to [facility], he continued to have an elevated WBC count, with normal liver enzymes and negative lipase. General surgery was consulted who recommended nonsurgical management and observation. He was started on ceftriaxone and IV metronidazole, made n.p.o. and NG tube to suction was placed, he received Zofran and metoclopramide for nausea and morphine for pain control.  Patient started improving, he had no new episodes of fever in the hospital and his WBC count started to trend down.  Serial abdominal x-rays were obtained which showed decreased distention of the small bowel.  Upon improvement of his nausea, he was slowly transitioned to clear liquids which he tolerated well with  increased output in his colostomy bag and was eventually started on a regular diet which he tolerated well and was discharged home with oral cefdinir and metronidazole at the time of discharge.    Patient /Hospital Summary (Details -- Problem Oriented) :          Small bowel obstruction (HCC)   Assessment & Plan    Likely 2/2 bowel wall thickening with fluid as seen on CT.   (Per Dr. Garcia, patient had a presacral mass/fluid collection last year which based on PET evaluation and biopsies was benign, and after discussion at tumor conference, it was decided not to intervene surgically.)  Patient is doing better clinically. His white count is 8.1. His vitals are stable.   Abdominal X ray:No abnormally distended loops of air-filled bowel bowel are evident to suggest bowel obstruction.There is no evidence of free intraperitoneal air.    Plan:  -Diet to regular diet   -discontinued NG tube, IV fluids  -Oral Cefdinir and flagyl for 2 days           Leukocytosis   Assessment & Plan    WBC count 8.1    Plan:  -Resolved  -On Day 2 Ceftriaxone-flagyl on admission       Hypokalemia   Assessment & Plan    Patient had a K of 3 at [facility] likely 2/2 excessive vomiting and poor oral intake.He was given zofran and NS.  His K is 3.6.    Plan- Replaced with NS 0.9% with KCl 40MEq       Colorectal cancer (HCC)-resolved as of 7/10/2019   Assessment & Plan    Patient underwent complete colectomy in 2015. His last chemotherapy was in 2016. He has been stable since then.         Consultants:     General Surgery     Procedures:        None     Imaging/ Testing:      [id number] VIEWS   Final Result      No evidence of bowel obstruction.                  [id number] VIEW   Final Result      1.  Mildly distended bowel loops as seen on recent CT.      2.  Enteric tube tip projects over the distal stomach or proximal duodenum      OUTSIDE IMAGES-CT ABDOMEN /PELVIS   Final Result            Discharge Medications:         Medication  Reconciliation: Completed       Medication List      START taking these medications      Instructions   cefdinir 300 MG Caps  Commonly known as:  OMNICEF   Take 1 Cap by mouth every 12 hours.  Dose:  300 mg     metroNIDAZOLE 500 MG Tabs  Commonly known as:  FLAGYL   Take 1 Tab by mouth every 8 hours.  Dose:  500 mg        STOP taking these medications    acetaminophen 500 MG Tabs  Commonly known as:  TYLENOL             Beau Mcintyre   Home Medication Instructions BETZY:[id number]    Printed on:07/11/19 0822   Medication Information                      cefdinir (OMNICEF) 300 MG Cap  Take 1 Cap by mouth every 12 hours.             metroNIDAZOLE (FLAGYL) 500 MG Tab  Take 1 Tab by mouth every 8 hours.                      Disposition:   Home    Diet:   Regular     Activity:   As tolerated     Instructions:         The patient was instructed to return to the ER in the event of worsening symptoms. I have counseled the patient on the importance of compliance and the patient has agreed to proceed with all medical recommendations and follow up plan indicated above.   The patient understands that all medications come with benefits and risks. Risks may include permanent injury or death and these risks can be minimized with close reassessment and monitoring.        Primary Care Provider:    Pcp Pt States None    Discharge summary faxed to primary care provider:  Deferred  Copy of discharge summary given to the patient: Deferred      Follow up appointment details :      No future appointments.  Derrek Garcia M.D.  [address] [place] [id number]  [phone]      As needed, For follow up    follow up with a primary care provider, if you do not have one you should establish one    Schedule an appointment as soon as possible for a visit in 1 week  For follow up    Pcp Pt States None           Follow up with PCP     Pending Studies:        None     Time spent on discharge day patient visit, preparing discharge  paperwork and arranging for patient follow up.    Summary of follow up issues:   None     Discharge Time (Minutes) :    55 minutes   Hospital Course Type:  Inpatient Stay >2 midnights

## 2019-07-09 NOTE — WOUND TEAM
Consult placed for established ileostomy patient.  He reports he has had an ostomy since 2003 and this ileostomy since 2015.  Currently patient is eating breakfast and is not in need of any ostomy assistance and doesn't really want to show me his appliance.  He declines any ostomy supplies, is pleasant and independent with care.  No planned follow up indicated.  Discussed with staff RN.

## 2019-07-09 NOTE — PROGRESS NOTES
Removed NGT per orders from MD liu.   Given some water and started on diet per new order. Educated on diet.   Denies nausea at this time.   PIV SL per new order.   Educated patient to please ambulate around room and kaufman this AM, can get up to shower and to please brush teeth.   Patient agreeable to plan.

## 2019-07-09 NOTE — PROGRESS NOTES
7/9  Pt seen and examined, overnight his stoma has started working again.  He feels like his appetite has returned and he would like to start PO.  NGT output has been bilious, was high yesterday.  Abdomen less distended this am.      Labs wnl  Exam benign    SBO appears to be resolving.  Ok to remove NGT this morning and start PO.  Will also SLIV, allow shower and have pt ambulate.  If he continues to do well, possible he may go home later today.

## 2019-07-09 NOTE — PROGRESS NOTES
AA&Ox4.  Reported pain medicated per MAR.  Right nare NGT set to low wall suction draining small bilious output.  NPO with ice chips. Denies N/V. Pt very eager to start diet.  + void.  Hypoactive bowel sounds. + stool via RLQ ileostomy. Pt performs self care.   Pt is upself with steady gait.   All needs met at this time. Call light within reach. Pt calls appropriately.

## 2019-07-09 NOTE — PROGRESS NOTES
[facility] paged and updated regarding pt's ileostomy output. NG tube to remain in place until assessed by day team.

## 2019-07-09 NOTE — DISCHARGE PLANNING
Anticipated Discharge Disposition: Home    Action: RN CM assessed pt at bedside. Pt reports he alone in a travel trailer in [place]. Pt states he is independent with all his ADLs and IADLs. He is currently working construction part time, has prescription drug coverage and reports no financial needs at this time. Pt does report he needs assistance with a ride back to [place] as he has no family/friends nearby that can assist.     Barriers to Discharge: Medical clearance    Plan: Await medical clearance for discharge    Care Transition Team Assessment    Information Source  Orientation : Oriented x 4  Information Given By: Patient  Who is responsible for making decisions for patient? : Patient    Readmission Evaluation  Is this a readmission?: No    Elopement Risk  Legal Hold: No  Ambulatory or Self Mobile in Wheelchair: Yes  Disoriented: No  Psychiatric Symptoms: None  History of Wandering: No  Elopement this Admit: No  Vocalizing Wanting to Leave: No  Displays Behaviors, Body Language Wanting to Leave: No-Not at Risk for Elopement  Elopement Risk: Not at Risk for Elopement    Interdisciplinary Discharge Planning  Primary Care Physician: None  Lives with - Patient's Self Care Capacity: Alone and Able to Care For Self  Patient or legal guardian wants to designate a caregiver (see row info): No  Support Systems: None  Housing / Facility: Other (Comments) (Travel trailer)  Able to Return to Previous ADL's: Yes  Mobility Issues: No  Prior Services: None, Home-Independent  Patient Expects to be Discharged to:: Home  Assistance Needed: No  Durable Medical Equipment: Not Applicable    Discharge Preparedness  What is your plan after discharge?: Other (comment) (Home)  What are your discharge supports?: Other (comment) (None)  Prior Functional Level: Ambulatory, Drives Self, Independent with Activities of Daily Living, Independent with Medication Management  Difficulity with ADLs: None  Difficulity with IADLs:  None    Functional Assesment  Prior Functional Level: Ambulatory, Drives Self, Independent with Activities of Daily Living, Independent with Medication Management    Finances  Financial Barriers to Discharge: No  Prescription Coverage: Yes              Advance Directive  Advance Directive?: None    Domestic Abuse  Have you ever been the victim of abuse or violence?: No         Discharge Risks or Barriers  Discharge risks or barriers?: Lives alone, no community support, No PCP  Patient risk factors: No PCP, Lives alone and no community support, Lack of outside supports    Anticipated Discharge Information  Anticipated discharge disposition: Home  Discharge Address: [facility] [place], [place]  Discharge Contact Phone Number: [phone]

## 2019-07-09 NOTE — NON-PROVIDER
Internal Medicine Interval Note  Note Author: Quin Rodrigues, Student     Name Beau Mcintyre 1970   Age/Sex 48 y.o. male   MRN [id number]   Code Status Full            Reason for interval visit  (Principal Problem)   Small Bowel Obstruction     Interval Problem Daily Status Update  (24 hours, problem oriented, brief subjective history, new lab/imaging data pertinent to that problem)   Pt is feeling much better today, his pain levels have reduced down to a 5/10. He reports soreness in the abdomen in the LUQ and RLQ as well as soreness around his nose due to the NGT. He still feels pain in the LLQ. His nausea has subsided, and he has increased output in his ileostomy bag but denies passing any gas. His NGT has been removed, and he was able to tolerate a full liquid diet.   Potassium decreased this morning to 3.4 from 3.6 (19), Pt was given NS 0/9% with KCl 20MEq.  Pt is on day 3 of IV Antibiotics: ceftriaxone 2g in NS 100ml Q24HRS and flagyl 500mg Q8HRS. Pt is on morphine 3mg Q2HRS PRN.  Wbc has decreased to 8.1 from 12.1 on admission.   Xray on 19 shows distended small bowel loops over mid abdomen. Enteric tube tip is present over the distal stomach or proximal duodenum.     ROS N/A    Disposition/Barriers to discharge:   Home / tolerate oral feeds      Consultants/Specialty  Dr. Garcia, General Surgery  PCP: Pcp Pt States None      Quality Measures  Quality-Core Measures   Reviewed items::  Medications reviewed, Labs reviewed and Radiology images reviewed  Olivas catheter::  No Olivas          Physical Exam       Vitals:    19 1600 19 2000 19 0400 19 0731   BP: (!) 92/69 111/67 (!) 99/66 100/67   Pulse: 99 89 77 69   Resp: 18 19 17 15   Temp: 37.1 °C (98.8 °F) 36.9 °C (98.5 °F) 37.3 °C (99.2 °F) 37.2 °C (98.9 °F)   TempSrc: Temporal Temporal Temporal Temporal   SpO2: 91% 90% 90% 94%   Weight:       Height:         Body mass index is 27.67 kg/m².    Oxygen  "Therapy:  Pulse Oximetry: 94 %, O2 (LPM): 0, O2 Delivery: None (Room Air)    Physical Exam   Constitutional: He is well-developed, well-nourished, and in no distress.  Head: normocephalic and atraumatic  Cardiovascular: normal rate, regular rhythm and normal heart sounds.  Pulmonary/Chest: Effort normal and breath sounds normal. No respiratory distress. He has no wheezes, rales or rhonchi.   Abdominal: No distention and no masses. Active bowel sounds present. There is RLQ, LUQ, and LLQ tenderness. There is no rebound and no guarding.           Assessment/Plan     No new Assessment & Plan notes have been filed under this hospital service since the last note was generated.  Service: Internal Medicine  Pt is a 47 yo male s/p colectomy in 2015 due to colon cancer who presented to the ED with a small bowel obstruction 2 days ago being treated conservatively and antibiotics. Pt has improved clinically over the last 24 hours. His white cell count has reduced from 12.1 (on admission) to 8.1. His NGT was removed, and he has been able to tolerate a \"full liquid diet.\"    Plan:   - advance diet to \"regular diet\" and see how Pt tolerates solids.   - continue serial plain abdominal films to monitor obstruction  - monitor electrolytes: repeat BMP, replete potassium if needed.   - encourage ambulation   - oral antibiotics: Augmentin 875 mg 3 time daily and Flagyl 700 mg 3 times daily.    "

## 2019-07-10 VITALS
SYSTOLIC BLOOD PRESSURE: 99 MMHG | OXYGEN SATURATION: 100 % | TEMPERATURE: 98.2 F | RESPIRATION RATE: 18 BRPM | DIASTOLIC BLOOD PRESSURE: 68 MMHG | HEART RATE: 80 BPM | HEIGHT: 72 IN | BODY MASS INDEX: 27.63 KG/M2 | WEIGHT: 204 LBS

## 2019-07-10 PROBLEM — C19 COLORECTAL CANCER (HCC): Status: RESOLVED | Noted: 2019-07-08 | Resolved: 2019-07-10

## 2019-07-10 PROCEDURE — 99239 HOSP IP/OBS DSCHRG MGMT >30: CPT | Mod: GC | Performed by: HOSPITALIST

## 2019-07-10 PROCEDURE — 700101 HCHG RX REV CODE 250: Performed by: INTERNAL MEDICINE

## 2019-07-10 RX ORDER — METRONIDAZOLE 500 MG/1
500 TABLET ORAL EVERY 8 HOURS
Status: DISCONTINUED | OUTPATIENT
Start: 2019-07-10 | End: 2019-07-10 | Stop reason: HOSPADM

## 2019-07-10 RX ORDER — METRONIDAZOLE 500 MG/1
500 TABLET ORAL EVERY 8 HOURS
Qty: 9 TAB | Refills: 0 | Status: SHIPPED | OUTPATIENT
Start: 2019-07-10 | End: 2022-03-21

## 2019-07-10 RX ORDER — CEFDINIR 300 MG/1
300 CAPSULE ORAL EVERY 12 HOURS
Qty: 6 CAP | Refills: 0 | Status: SHIPPED | OUTPATIENT
Start: 2019-07-10 | End: 2022-03-21

## 2019-07-10 RX ORDER — CEFDINIR 300 MG/1
300 CAPSULE ORAL EVERY 12 HOURS
Status: DISCONTINUED | OUTPATIENT
Start: 2019-07-10 | End: 2019-07-10 | Stop reason: HOSPADM

## 2019-07-10 RX ADMIN — METRONIDAZOLE 500 MG: 500 INJECTION, SOLUTION INTRAVENOUS at 04:59

## 2019-07-10 NOTE — CARE PLAN
Problem: Discharge Barriers/Planning  Goal: Patient's continuum of care needs will be met  Outcome: MET Date Met: 07/10/19  Meets criteria for discharge, wants to discharge.  Will pay for ride home.

## 2019-07-10 NOTE — PROGRESS NOTES
Report received from Katty CEDEÑO  Assumed care of patient at 1900.    Pt is A&O x 4.  Pain reported at 6/10. Medicated per MAR  Nausea denied  Tolerating Diet   Bowel sounds present and normoactive x 4 Q  Ileostomy in place and active.  + Urine output  + BM    + Flatus  Up self with steady gait   Bed in lowest position and locked.  Pt resting comfortably now.  Review plan of care with patient  Call light within reach  Hourly rounds in place  All needs met at this time

## 2019-07-10 NOTE — PROGRESS NOTES
7/10  Pt seen and examined, ambulating, pain controlled, denies any nausea or emesis.    Abdomen soft, benign  SBO resolved, d/c home today per primary team  Follow up with Dr Jose cruz  Please call with any questions

## 2019-07-10 NOTE — CARE PLAN
Problem: Knowledge Deficit  Goal: Knowledge of disease process/condition, treatment plan, diagnostic tests, and medications will improve  Outcome: PROGRESSING AS EXPECTED    Intervention: Explain information regarding disease process/condition, treatment plan, diagnostic tests, and medications and document in education  Pt was educated on POC, MAR, shift routine and nursing education R/T Dx. Questions were encouraged and answered. Pt verbalized understanding of all teaching.           Problem: Psychosocial Needs:  Goal: Level of anxiety will decrease  Outcome: PROGRESSING AS EXPECTED  Pt is requesting to sleep as much as possible to tonight as being constantly woken up is stressing him out. This RN and CNA are attempting to cluster care.

## 2019-07-10 NOTE — PROGRESS NOTES
Patient discharged home by self.    IV removed prior to discharge.   Signed prescriptions given to patient.  Discharge education provided, all questions answered.   Verbalized understanding of discharge education.   Ambulated out with all personal belongings collected from room. Declined escort down to hotel lobby  Patient states he is going to rent a car or take a cab home.

## 2019-07-10 NOTE — PROGRESS NOTES
Report received from night RN, assumed Care.   Patient is AOx4, responds appropriately.      Pain controlled at this time.  States his abdomen is sore.   + output in ileostomy, cares for it himself.   Patient is tolerating regular diet, denies nausea/vomiting. + flatus  Up self with steady gait.  Wants to go home.   States no ride but he is willing to pay for a ride or a rental car at this point.  PIV SL.  Plan to discharge.    Call light and belongings within reach, treaded slipper socks on, bed in lowest locked position.  Hourly rounding in place, all needs met at this time

## 2019-07-11 ASSESSMENT — ENCOUNTER SYMPTOMS
DIAPHORESIS: 0
DOUBLE VISION: 0
MYALGIAS: 0
ABDOMINAL PAIN: 0
HEARTBURN: 0
SPUTUM PRODUCTION: 0
CHILLS: 0
WEAKNESS: 0
DIZZINESS: 0
CONSTIPATION: 0
ORTHOPNEA: 0
DEPRESSION: 1
NAUSEA: 0
PHOTOPHOBIA: 0
NECK PAIN: 0
WHEEZING: 0
DIARRHEA: 0
HEMOPTYSIS: 0
COUGH: 0
BLURRED VISION: 0
BRUISES/BLEEDS EASILY: 0
HEADACHES: 0
VOMITING: 0
SHORTNESS OF BREATH: 0
BLOOD IN STOOL: 0
TINGLING: 0
MEMORY LOSS: 0
PALPITATIONS: 0
FEVER: 0

## 2019-07-11 NOTE — PROGRESS NOTES
Internal Medicine Interval Note  Note Author: Kirti Wright M.D.     Name Beau Mcintyre 1970   Age/Sex 48 y.o. male   MRN [id number]   Code Status FULL     After 5PM or if no immediate response to page, please call for cross-coverage  Attending/Team: Dr. Carmona/ALYSIA See Patient List for primary contact information  Call [phone] to page    1st Call - Day Intern (R1):   Dr. Wright 2nd Call - Day Sr. Resident (R2/R3):   Dr. Boone         Reason for interval visit  (Principal Problem)   Small Bowel Obstruction         Interval Problem Daily Status Update  (24 hours, problem oriented, brief subjective history, new lab/imaging data pertinent to that problem)   Patient doing well this morning.  He has no abdominal pain.  His ileostomy bag is filling every 2-3 hours.  She is tolerating diet well.    Review of Systems   Constitutional: Negative for chills, diaphoresis, fever and malaise/fatigue.   HENT: Negative for ear pain, hearing loss and tinnitus.    Eyes: Negative for blurred vision, double vision and photophobia.   Respiratory: Negative for cough, hemoptysis, sputum production, shortness of breath and wheezing.    Cardiovascular: Negative for chest pain, palpitations, orthopnea and leg swelling.   Gastrointestinal: Negative for abdominal pain (5/10), blood in stool, constipation, diarrhea, heartburn, melena, nausea and vomiting.   Genitourinary: Negative for dysuria, frequency and urgency.   Musculoskeletal: Negative for myalgias and neck pain.   Neurological: Negative for dizziness, tingling, weakness and headaches.   Endo/Heme/Allergies: Does not bruise/bleed easily.   Psychiatric/Behavioral: Positive for depression. Negative for memory loss.       Disposition/Barriers to discharge:   None    Consultants/Specialty  Dr. Garcia  PCP: Pcp Pt States None      Quality Measures  Quality-Core Measures   Reviewed items::  Labs reviewed, Medications reviewed and Radiology images reviewed  Brendon  catheter::  No Olivas  DVT prophylaxis - mechanical:  SCDs  Ulcer Prophylaxis::  No          Physical Exam       Vitals:    07/09/19 1926 07/10/19 0000 07/10/19 0338 07/10/19 0735   BP: 100/65  (!) 93/61 (!) 99/68   Pulse: 76  60 80   Resp: 16 16 15 18   Temp: 37.2 °C (99 °F)  37.6 °C (99.6 °F) 36.8 °C (98.2 °F)   TempSrc: Temporal  Temporal Temporal   SpO2: 94%  95% 100%   Weight:       Height:         Body mass index is 27.67 kg/m².    Oxygen Therapy:       Physical Exam   Constitutional: He is oriented to person, place, and time and well-developed, well-nourished, and in no distress. No distress.   HENT:   Head: Normocephalic and atraumatic.   Eyes: Pupils are equal, round, and reactive to light. Conjunctivae and EOM are normal.   Neck: Normal range of motion. Neck supple. No JVD present. No tracheal deviation present. No thyromegaly present.   Cardiovascular: Normal rate, regular rhythm, normal heart sounds and intact distal pulses.  Exam reveals no gallop and no friction rub.    No murmur heard.  Pulmonary/Chest: Effort normal and breath sounds normal. No respiratory distress. He has no wheezes. He has no rales.   Abdominal: Soft. Bowel sounds are normal. He exhibits no distension. There is no tenderness. There is no rebound.   Musculoskeletal: Normal range of motion. He exhibits no edema.   Neurological: He is alert and oriented to person, place, and time.   Skin: Skin is warm. He is not diaphoretic.   Psychiatric: Mood and affect normal.             Assessment/Plan     Small bowel obstruction (HCC)   Assessment & Plan    Likely 2/2 bowel wall thickening with fluid as seen on CT.   (Per Dr. Garcia, patient had a presacral mass/fluid collection last year which based on PET evaluation and biopsies was benign, and after discussion at tumor conference, it was decided not to intervene surgically.)  Patient is doing better clinically. His white count is 8.1. His vitals are stable.   Abdominal X ray:No abnormally  distended loops of air-filled bowel bowel are evident to suggest bowel obstruction.There is no evidence of free intraperitoneal air.    Plan:  -Diet to regular diet   -discontinued NG tube, IV fluids  -Oral Cefdinir and flagyl for 2 days           Leukocytosis   Assessment & Plan    WBC count 8.1    Plan:  -Resolved  -On Day 2 Ceftriaxone-flagyl on admission       Hypokalemia   Assessment & Plan    Patient had a K of 3 at [facility] likely 2/2 excessive vomiting and poor oral intake.He was given zofran and NS.  His K is 3.6.    Plan- Replaced with NS 0.9% with KCl 40MEq

## 2020-01-14 ENCOUNTER — OFFICE VISIT (OUTPATIENT)
Dept: DERMATOLOGY | Facility: IMAGING CENTER | Age: 50
End: 2020-01-14
Payer: MEDICARE

## 2020-01-14 VITALS — BODY MASS INDEX: 26.95 KG/M2 | WEIGHT: 210 LBS | HEIGHT: 74 IN | TEMPERATURE: 97.3 F

## 2020-01-14 DIAGNOSIS — D48.5 NEOPLASM OF UNCERTAIN BEHAVIOR OF SKIN: ICD-10-CM

## 2020-01-14 PROCEDURE — 11102 TANGNTL BX SKIN SINGLE LES: CPT | Performed by: NURSE PRACTITIONER

## 2020-01-14 ASSESSMENT — ENCOUNTER SYMPTOMS
FEVER: 0
CHILLS: 0
WEIGHT LOSS: 0
DIAPHORESIS: 0

## 2020-01-14 NOTE — PROGRESS NOTES
Dermatology New Patient Visit    Chief Complaint   Patient presents with   • Skin Lesion       Subjective:     HPI:   Beau Mcintyre is a 49 y.o. male presenting for    HPI: non healing skin lesion   Location: chin   Time present: 1 month   Painful lesion: Yes  Itching lesion: Yes  Enlarging lesion: Yes  Anything make it better or worse?no    History of skin cancer: No  History of biopsies:No  History of blistering/severe sunburns:No  Family history of skin cancer:No  Family history of atypical moles:No    Tobacco use: Actively   Chewing   Alcohol use:denies alcohol consumption  Allergies:Yes, Details: see chart          Past Medical History:   Diagnosis Date   • Cancer (HCC)    • Small bowel obstruction (HCC)     Recurrent       Current Outpatient Medications on File Prior to Visit   Medication Sig Dispense Refill   • cefdinir (OMNICEF) 300 MG Cap Take 1 Cap by mouth every 12 hours. 6 Cap 0   • metroNIDAZOLE (FLAGYL) 500 MG Tab Take 1 Tab by mouth every 8 hours. 9 Tab 0     No current facility-administered medications on file prior to visit.        Allergies   Allergen Reactions   • Hydrocodone Hives     Hives - all over body   • Tape Rash     Rash and blistering with prolonged exposure  Some tape causes immediate rash, burning       Family History   Problem Relation Age of Onset   • Colon Cancer Mother    • Cancer Mother         Hepatocellular cancer       Social History     Socioeconomic History   • Marital status: Single     Spouse name: Not on file   • Number of children: Not on file   • Years of education: Not on file   • Highest education level: Not on file   Occupational History   • Not on file   Social Needs   • Financial resource strain: Not on file   • Food insecurity:     Worry: Not on file     Inability: Not on file   • Transportation needs:     Medical: Not on file     Non-medical: Not on file   Tobacco Use   • Smoking status: Former Smoker     Packs/day: 1.00     Types: Cigarettes     Last attempt to  "quit: 1994     Years since quittin.5   • Smokeless tobacco: Never Used   Substance and Sexual Activity   • Alcohol use: Not on file   • Drug use: Not on file   • Sexual activity: Not on file   Lifestyle   • Physical activity:     Days per week: Not on file     Minutes per session: Not on file   • Stress: Not on file   Relationships   • Social connections:     Talks on phone: Not on file     Gets together: Not on file     Attends Orthodox service: Not on file     Active member of club or organization: Not on file     Attends meetings of clubs or organizations: Not on file     Relationship status: Not on file   • Intimate partner violence:     Fear of current or ex partner: Not on file     Emotionally abused: Not on file     Physically abused: Not on file     Forced sexual activity: Not on file   Other Topics Concern   • Not on file   Social History Narrative   • Not on file       Review of Systems   Constitutional: Negative for chills, diaphoresis, fever, malaise/fatigue and weight loss.   Skin: Negative for itching and rash.        Objective:     A focused cutaneous exam was completed including: face, eyelids and lips with the following pertinent findings listed below. Remaining above-listed examined areas within normal limits / negative for rashes or lesions.      Temp 36.3 °C (97.3 °F)   Ht 1.88 m (6' 2\")   Wt 95.3 kg (210 lb)     Physical Exam   Constitutional: He is oriented to person, place, and time and well-developed, well-nourished, and in no distress. No distress.   HENT:   Head: Normocephalic.       Pulmonary/Chest: Effort normal.   Neurological: He is alert and oriented to person, place, and time.   Skin: Skin is warm and dry. No rash noted. There is erythema.   Psychiatric: Mood normal.       DATA: none applicable to review    Assessment and Plan:     1. Neoplasm of uncertain behavior of skin  Procedure Note   Procedure: Biopsy by shave technique  Location: right side of chin  Size: as noted " in exam  Preoperative diagnosis:BCc, KA, SCC  Risks, benefits and alternatives of procedure discussed and written informed consent obtained for procedure and verbal consent for photos. Time out completed. Area of biopsy prepped with alcohol. Anesthesia with 1% lidocaine with epinephrine administered with 30 gauge needle. Shave biopsy of the site performed. Hemostasis achieved with pressure and aluminum chloride. Vaseline applied to wound with bandage. Patient tolerated procedure well and there were no complications. The specimen was sent to the pathology lab by the staff. Wound care was discussed.    Discussed with pt that this will likely need Mohs surgery. Explained Mohs procedure. All questions aswered at this time. Advised to call with any further questions.     Followup: Return for pending biopsy results.    Doreen Mccormick A.P.R.N.

## 2020-01-20 ENCOUNTER — TELEPHONE (OUTPATIENT)
Dept: DERMATOLOGY | Facility: IMAGING CENTER | Age: 50
End: 2020-01-20

## 2020-01-20 DIAGNOSIS — C44.320 SQUAMOUS CELL CARCINOMA, FACE: ICD-10-CM

## 2020-01-20 NOTE — TELEPHONE ENCOUNTER
Pt called  Back, results explained to pt and understands he needs Mohs. Referral sent to [facility]. Path scanned in.

## 2022-03-21 ENCOUNTER — HOSPITAL ENCOUNTER (OUTPATIENT)
Dept: RADIOLOGY | Facility: MEDICAL CENTER | Age: 52
End: 2022-03-21

## 2022-03-21 ENCOUNTER — HOSPITAL ENCOUNTER (INPATIENT)
Facility: MEDICAL CENTER | Age: 52
LOS: 17 days | DRG: 372 | End: 2022-04-07
Attending: STUDENT IN AN ORGANIZED HEALTH CARE EDUCATION/TRAINING PROGRAM | Admitting: HOSPITALIST
Payer: COMMERCIAL

## 2022-03-21 DIAGNOSIS — R18.8 ABDOMINAL FLUID COLLECTION: ICD-10-CM

## 2022-03-21 DIAGNOSIS — K62.89 RECTAL MASS: ICD-10-CM

## 2022-03-21 PROBLEM — Z72.0 TOBACCO CHEW USE: Status: ACTIVE | Noted: 2022-03-21

## 2022-03-21 LAB
ALBUMIN SERPL BCP-MCNC: 3.5 G/DL (ref 3.2–4.9)
ALBUMIN/GLOB SERPL: 1 G/DL
ALP SERPL-CCNC: 102 U/L (ref 30–99)
ALT SERPL-CCNC: 14 U/L (ref 2–50)
ANION GAP SERPL CALC-SCNC: 13 MMOL/L (ref 7–16)
AST SERPL-CCNC: 16 U/L (ref 12–45)
BASOPHILS # BLD AUTO: 0.5 % (ref 0–1.8)
BASOPHILS # BLD: 0.11 K/UL (ref 0–0.12)
BILIRUB SERPL-MCNC: 0.9 MG/DL (ref 0.1–1.5)
BUN SERPL-MCNC: 13 MG/DL (ref 8–22)
CALCIUM SERPL-MCNC: 8.7 MG/DL (ref 8.5–10.5)
CHLORIDE SERPL-SCNC: 103 MMOL/L (ref 96–112)
CO2 SERPL-SCNC: 23 MMOL/L (ref 20–33)
CREAT SERPL-MCNC: 0.98 MG/DL (ref 0.5–1.4)
EOSINOPHIL # BLD AUTO: 0.03 K/UL (ref 0–0.51)
EOSINOPHIL NFR BLD: 0.1 % (ref 0–6.9)
ERYTHROCYTE [DISTWIDTH] IN BLOOD BY AUTOMATED COUNT: 45.7 FL (ref 35.9–50)
GFR SERPLBLD CREATININE-BSD FMLA CKD-EPI: 94 ML/MIN/1.73 M 2
GLOBULIN SER CALC-MCNC: 3.5 G/DL (ref 1.9–3.5)
GLUCOSE SERPL-MCNC: 129 MG/DL (ref 65–99)
HCT VFR BLD AUTO: 47.9 % (ref 42–52)
HGB BLD-MCNC: 16.4 G/DL (ref 14–18)
IMM GRANULOCYTES # BLD AUTO: 0.11 K/UL (ref 0–0.11)
IMM GRANULOCYTES NFR BLD AUTO: 0.5 % (ref 0–0.9)
INR PPP: 1.05 (ref 0.87–1.13)
LYMPHOCYTES # BLD AUTO: 1.21 K/UL (ref 1–4.8)
LYMPHOCYTES NFR BLD: 5.5 % (ref 22–41)
MAGNESIUM SERPL-MCNC: 1.8 MG/DL (ref 1.5–2.5)
MCH RBC QN AUTO: 31.2 PG (ref 27–33)
MCHC RBC AUTO-ENTMCNC: 34.2 G/DL (ref 33.7–35.3)
MCV RBC AUTO: 91.1 FL (ref 81.4–97.8)
MONOCYTES # BLD AUTO: 0.96 K/UL (ref 0–0.85)
MONOCYTES NFR BLD AUTO: 4.3 % (ref 0–13.4)
NEUTROPHILS # BLD AUTO: 19.71 K/UL (ref 1.82–7.42)
NEUTROPHILS NFR BLD: 89.1 % (ref 44–72)
NRBC # BLD AUTO: 0 K/UL
NRBC BLD-RTO: 0 /100 WBC
PLATELET # BLD AUTO: 427 K/UL (ref 164–446)
PMV BLD AUTO: 8.7 FL (ref 9–12.9)
POTASSIUM SERPL-SCNC: 4.4 MMOL/L (ref 3.6–5.5)
PROT SERPL-MCNC: 7 G/DL (ref 6–8.2)
PROTHROMBIN TIME: 13.4 SEC (ref 12–14.6)
RBC # BLD AUTO: 5.26 M/UL (ref 4.7–6.1)
SODIUM SERPL-SCNC: 139 MMOL/L (ref 135–145)
WBC # BLD AUTO: 22.1 K/UL (ref 4.8–10.8)

## 2022-03-21 PROCEDURE — 80053 COMPREHEN METABOLIC PANEL: CPT

## 2022-03-21 PROCEDURE — 770001 HCHG ROOM/CARE - MED/SURG/GYN PRIV*

## 2022-03-21 PROCEDURE — 99223 1ST HOSP IP/OBS HIGH 75: CPT | Mod: AI | Performed by: HOSPITALIST

## 2022-03-21 PROCEDURE — 700102 HCHG RX REV CODE 250 W/ 637 OVERRIDE(OP): Performed by: HOSPITALIST

## 2022-03-21 PROCEDURE — 83735 ASSAY OF MAGNESIUM: CPT

## 2022-03-21 PROCEDURE — 85610 PROTHROMBIN TIME: CPT

## 2022-03-21 PROCEDURE — 85025 COMPLETE CBC W/AUTO DIFF WBC: CPT

## 2022-03-21 PROCEDURE — 700111 HCHG RX REV CODE 636 W/ 250 OVERRIDE (IP): Performed by: HOSPITALIST

## 2022-03-21 PROCEDURE — 36415 COLL VENOUS BLD VENIPUNCTURE: CPT

## 2022-03-21 PROCEDURE — 700105 HCHG RX REV CODE 258: Performed by: HOSPITALIST

## 2022-03-21 PROCEDURE — 99221 1ST HOSP IP/OBS SF/LOW 40: CPT | Performed by: SURGERY

## 2022-03-21 PROCEDURE — A9270 NON-COVERED ITEM OR SERVICE: HCPCS | Performed by: HOSPITALIST

## 2022-03-21 RX ORDER — PROMETHAZINE HYDROCHLORIDE 25 MG/1
12.5-25 SUPPOSITORY RECTAL EVERY 4 HOURS PRN
Status: DISCONTINUED | OUTPATIENT
Start: 2022-03-21 | End: 2022-04-07 | Stop reason: HOSPADM

## 2022-03-21 RX ORDER — MORPHINE SULFATE 4 MG/ML
2 INJECTION INTRAVENOUS
Status: DISCONTINUED | OUTPATIENT
Start: 2022-03-21 | End: 2022-03-24

## 2022-03-21 RX ORDER — OXYCODONE HYDROCHLORIDE 5 MG/1
5 TABLET ORAL
Status: DISCONTINUED | OUTPATIENT
Start: 2022-03-21 | End: 2022-03-24

## 2022-03-21 RX ORDER — LABETALOL HYDROCHLORIDE 5 MG/ML
10 INJECTION, SOLUTION INTRAVENOUS EVERY 4 HOURS PRN
Status: DISCONTINUED | OUTPATIENT
Start: 2022-03-21 | End: 2022-04-07 | Stop reason: HOSPADM

## 2022-03-21 RX ORDER — PROCHLORPERAZINE EDISYLATE 5 MG/ML
5-10 INJECTION INTRAMUSCULAR; INTRAVENOUS EVERY 4 HOURS PRN
Status: DISCONTINUED | OUTPATIENT
Start: 2022-03-21 | End: 2022-04-07 | Stop reason: HOSPADM

## 2022-03-21 RX ORDER — SODIUM CHLORIDE, SODIUM LACTATE, POTASSIUM CHLORIDE, CALCIUM CHLORIDE 600; 310; 30; 20 MG/100ML; MG/100ML; MG/100ML; MG/100ML
INJECTION, SOLUTION INTRAVENOUS CONTINUOUS
Status: DISCONTINUED | OUTPATIENT
Start: 2022-03-21 | End: 2022-03-23

## 2022-03-21 RX ORDER — AMOXICILLIN 250 MG
2 CAPSULE ORAL 2 TIMES DAILY
Status: DISCONTINUED | OUTPATIENT
Start: 2022-03-21 | End: 2022-04-07 | Stop reason: HOSPADM

## 2022-03-21 RX ORDER — ASPIRIN 325 MG
325 TABLET ORAL EVERY 6 HOURS PRN
Status: ON HOLD | COMMUNITY
End: 2022-03-21

## 2022-03-21 RX ORDER — POLYETHYLENE GLYCOL 3350 17 G/17G
1 POWDER, FOR SOLUTION ORAL
Status: DISCONTINUED | OUTPATIENT
Start: 2022-03-21 | End: 2022-04-07 | Stop reason: HOSPADM

## 2022-03-21 RX ORDER — ONDANSETRON 4 MG/1
4 TABLET, ORALLY DISINTEGRATING ORAL EVERY 4 HOURS PRN
Status: DISCONTINUED | OUTPATIENT
Start: 2022-03-21 | End: 2022-04-07 | Stop reason: HOSPADM

## 2022-03-21 RX ORDER — PROMETHAZINE HYDROCHLORIDE 25 MG/1
12.5-25 TABLET ORAL EVERY 4 HOURS PRN
Status: DISCONTINUED | OUTPATIENT
Start: 2022-03-21 | End: 2022-04-07 | Stop reason: HOSPADM

## 2022-03-21 RX ORDER — ONDANSETRON 2 MG/ML
4 INJECTION INTRAMUSCULAR; INTRAVENOUS EVERY 4 HOURS PRN
Status: DISCONTINUED | OUTPATIENT
Start: 2022-03-21 | End: 2022-04-07 | Stop reason: HOSPADM

## 2022-03-21 RX ORDER — ONDANSETRON 2 MG/ML
4 INJECTION INTRAMUSCULAR; INTRAVENOUS EVERY 4 HOURS PRN
Status: ACTIVE | OUTPATIENT
Start: 2022-03-21 | End: 2022-03-21

## 2022-03-21 RX ORDER — OXYCODONE HYDROCHLORIDE 5 MG/1
2.5 TABLET ORAL
Status: DISCONTINUED | OUTPATIENT
Start: 2022-03-21 | End: 2022-03-24

## 2022-03-21 RX ORDER — ACETAMINOPHEN 325 MG/1
650 TABLET ORAL EVERY 6 HOURS PRN
Status: DISCONTINUED | OUTPATIENT
Start: 2022-03-21 | End: 2022-04-07 | Stop reason: HOSPADM

## 2022-03-21 RX ORDER — BISACODYL 10 MG
10 SUPPOSITORY, RECTAL RECTAL
Status: DISCONTINUED | OUTPATIENT
Start: 2022-03-21 | End: 2022-04-07 | Stop reason: HOSPADM

## 2022-03-21 RX ADMIN — OXYCODONE 2.5 MG: 5 TABLET ORAL at 13:46

## 2022-03-21 RX ADMIN — ENOXAPARIN SODIUM 40 MG: 40 INJECTION SUBCUTANEOUS at 14:58

## 2022-03-21 RX ADMIN — SODIUM CHLORIDE, POTASSIUM CHLORIDE, SODIUM LACTATE AND CALCIUM CHLORIDE: 600; 310; 30; 20 INJECTION, SOLUTION INTRAVENOUS at 22:54

## 2022-03-21 RX ADMIN — MORPHINE SULFATE 2 MG: 4 INJECTION INTRAVENOUS at 22:53

## 2022-03-21 RX ADMIN — SENNOSIDES AND DOCUSATE SODIUM 2 TABLET: 50; 8.6 TABLET ORAL at 17:51

## 2022-03-21 RX ADMIN — SODIUM CHLORIDE, POTASSIUM CHLORIDE, SODIUM LACTATE AND CALCIUM CHLORIDE: 600; 310; 30; 20 INJECTION, SOLUTION INTRAVENOUS at 13:44

## 2022-03-21 RX ADMIN — MORPHINE SULFATE 2 MG: 4 INJECTION INTRAVENOUS at 15:06

## 2022-03-21 RX ADMIN — OXYCODONE 5 MG: 5 TABLET ORAL at 21:38

## 2022-03-21 ASSESSMENT — LIFESTYLE VARIABLES
EVER HAD A DRINK FIRST THING IN THE MORNING TO STEADY YOUR NERVES TO GET RID OF A HANGOVER: NO
CONSUMPTION TOTAL: NEGATIVE
HAVE YOU EVER FELT YOU SHOULD CUT DOWN ON YOUR DRINKING: NO
EVER FELT BAD OR GUILTY ABOUT YOUR DRINKING: NO
TOTAL SCORE: 0
ON A TYPICAL DAY WHEN YOU DRINK ALCOHOL HOW MANY DRINKS DO YOU HAVE: 0
HOW MANY TIMES IN THE PAST YEAR HAVE YOU HAD 5 OR MORE DRINKS IN A DAY: 0
AVERAGE NUMBER OF DAYS PER WEEK YOU HAVE A DRINK CONTAINING ALCOHOL: 0
HAVE PEOPLE ANNOYED YOU BY CRITICIZING YOUR DRINKING: NO
DOES PATIENT WANT TO STOP DRINKING: NO
SUBSTANCE_ABUSE: 0
ALCOHOL_USE: NO
TOTAL SCORE: 0
TOTAL SCORE: 0

## 2022-03-21 ASSESSMENT — ENCOUNTER SYMPTOMS
SENSORY CHANGE: 0
VOMITING: 0
FEVER: 0
DIZZINESS: 0
SORE THROAT: 0
ABDOMINAL PAIN: 1
NAUSEA: 1
SHORTNESS OF BREATH: 0
BLOOD IN STOOL: 0
SPEECH CHANGE: 0
NERVOUS/ANXIOUS: 0
PALPITATIONS: 0
CHILLS: 0
STRIDOR: 0
HEADACHES: 0
WEIGHT LOSS: 0
BACK PAIN: 0
DIARRHEA: 0
EYE DISCHARGE: 0
COUGH: 0

## 2022-03-21 ASSESSMENT — PATIENT HEALTH QUESTIONNAIRE - PHQ9
5. POOR APPETITE OR OVEREATING: SEVERAL DAYS
3. TROUBLE FALLING OR STAYING ASLEEP OR SLEEPING TOO MUCH: NOT AT ALL
7. TROUBLE CONCENTRATING ON THINGS, SUCH AS READING THE NEWSPAPER OR WATCHING TELEVISION: NOT AT ALL
1. LITTLE INTEREST OR PLEASURE IN DOING THINGS: NEARLY EVERY DAY
2. FEELING DOWN, DEPRESSED, IRRITABLE, OR HOPELESS: NOT AT ALL
SUM OF ALL RESPONSES TO PHQ QUESTIONS 1-9: 6
8. MOVING OR SPEAKING SO SLOWLY THAT OTHER PEOPLE COULD HAVE NOTICED. OR THE OPPOSITE, BEING SO FIGETY OR RESTLESS THAT YOU HAVE BEEN MOVING AROUND A LOT MORE THAN USUAL: NOT AT ALL
4. FEELING TIRED OR HAVING LITTLE ENERGY: MORE THAN HALF THE DAYS
SUM OF ALL RESPONSES TO PHQ9 QUESTIONS 1 AND 2: 3
6. FEELING BAD ABOUT YOURSELF - OR THAT YOU ARE A FAILURE OR HAVE LET YOURSELF OR YOUR FAMILY DOWN: NOT AL ALL
9. THOUGHTS THAT YOU WOULD BE BETTER OFF DEAD, OR OF HURTING YOURSELF: NOT AT ALL

## 2022-03-21 ASSESSMENT — COGNITIVE AND FUNCTIONAL STATUS - GENERAL
SUGGESTED CMS G CODE MODIFIER MOBILITY: CI
CLIMB 3 TO 5 STEPS WITH RAILING: A LITTLE
DAILY ACTIVITIY SCORE: 24
MOBILITY SCORE: 23
SUGGESTED CMS G CODE MODIFIER DAILY ACTIVITY: CH

## 2022-03-21 ASSESSMENT — PAIN DESCRIPTION - PAIN TYPE
TYPE: ACUTE PAIN

## 2022-03-21 NOTE — ASSESSMENT & PLAN NOTE
Resolved initially withoiut abx's  Now appears on imaging with possible intra-abdominal abscess versus fistula versus both  See below  Trend WBC/fever curve

## 2022-03-21 NOTE — PROGRESS NOTES
[facility]PITAL[facility] TRIAGE OFFICER DIRECT ADMISSION REPORT  Transferring facility: [facility]   Transferring physician: Dr. Rizvi   Transferring facility/physician contact number: [place]   Chief complaint: Abdominal pain  Pertinent history & patient course: 51 year old history of Colon Ca s/p colectomy and ileostomy  presents with abdominal pain, nausea, vomiting and decreased output in ostomy. CT A/P showing showing bowel obstruction with 4x4 cm fluid collection posterior to bladder, suspicious for abscess. Patient Given Rocephin.  Pertinent imaging & lab results: WBC 18,000. hemoglobin 17, Cr 1.3, BUN 9, Na, K, Bicarb, Cl all wnl, , Lac 1.6, hemodynamically stable /90, COVID negative   Code Status: Full code   Further work up or recommendations per triage officer prior to transfer: Insert NGT and start on Flagyl  Consultants called prior to transfer and pertinent input from consultants: None  Patient accepted for transfer: Yes  Consultants to be called upon arrival: Gen Surg, IR  Admission status: Inpatient.   Floor requested:   If ICU transfer, name of intensivist case discussed with and pertinent input from critical care: None      Please inform the triage officer upon arrival of the patient to [facility] for assignment of a hospitalist to perform admission.      For any question or concerns regarding the care of this patient, please reach out to the assigned hospitalist.

## 2022-03-21 NOTE — PROGRESS NOTES
Pt arrived with EMS, pt ambulated to bed with steady gait.  Height and weight collected.  Med rec complete.  Admitting MD paged to bedside.    Received report from previous shift RN  Assessment complete.  A&O x 4. Patient calls appropriately.  Patient requires no assistance with ambulation. Bed alarm off.   Patient has 4/10 pain. Pain managed with prescribed medications.  Denies N&V. Tolerating NPO diet.  RLQ ileostomy.  + void, + flatus, + BM via ileostomy.  Patient denies SOB.  SCD's off.    Review plan of care with patient. Call light and personal belongings with in reach. Hourly rounding in place. All needs met at this time.

## 2022-03-21 NOTE — H&P
Hospital Medicine History & Physical Note    Date of Service  3/21/2022    Primary Care Physician  Pcp Pt States None  [facility]    Consultants  Gen Surgery      Code Status  Full Code    Chief Complaint  Abdominal pain x 2 days with N/V    History of Presenting Illness  Beau Mcintyre is a 51 y.o. male with a hx of colorectal cancer with resection/radiation/chemo in 2003.  He had additional surgery in 2013 and then again 2015 with a total colectomy for cancer recurrence.  He has had episodes of small bowel obstruction in the past.  He has an ileostomy. On Sunday, one day prior to admit, he began having abdominal pain and nausea with vomiting.  He noticed no output from his ileostomy.  He went to [facility] in [place] and on CT was concern of SBO and posterior to the bladder a 3.7 by 3.9cm fluid collection which was increased from prior imaging.  His wbc was elevated at 18 and outside facility was concerned and transferred the patient to [facility]. While at [facility] the patient did receive IV rocephin.     The patient states since his transfer he has noticed a return of stool into his ostomy.  He has some nausea but no vomiting. He drank some water that was at bedside despite the nurse telling him it was for moistening with sponge.  She does states some midline abdominal sharp pain.  He has some burning with urinating but no blood.      From [facility] his UA did not reflect infection.  His lipase was normal.  WBC:18.2.  CMP without significant abnormalities.    I discussed the plan of care with patient and bedside RN.    Review of Systems  Review of Systems   Constitutional: Negative for chills, fever and weight loss.   HENT: Negative for sore throat.    Eyes: Negative for discharge.   Respiratory: Negative for cough, shortness of breath and stridor.    Cardiovascular: Negative for chest pain, palpitations and leg swelling.   Gastrointestinal: Positive for abdominal pain and nausea. Negative for blood in stool,  "diarrhea, melena and vomiting.   Genitourinary: Negative for dysuria and hematuria.   Musculoskeletal: Negative for back pain and joint pain.   Neurological: Negative for dizziness, sensory change, speech change and headaches.   Psychiatric/Behavioral: Negative for substance abuse. The patient is not nervous/anxious.        Past Medical History   has a past medical history of Cancer (HCC) and Small bowel obstruction (HCC).    Surgical History   has a past surgical history that includes other abdominal surgery and hydrocelectomy adult. Left inguinal hernia repair (~2020).    Family History  family history includes Cancer in his mother; Colon Cancer in his mother.   Family history reviewed with patient. There is no family history that is pertinent to the chief complaint.     Social History   reports that he quit smoking about 27 years ago. His smoking use included cigarettes. He smoked 1.00 pack per day. He has never used smokeless tobacco. Single, no children.  Was working at the [employer] but was \"layed off.\"    Allergies  Allergies   Allergen Reactions   • Cantaloupe Itching     Throat itches   • Hydrocodone Hives     Hives - all over body   • Tape Rash     Rash and blistering with prolonged exposure  Some tape causes immediate rash, burning       Medications  Prior to Admission Medications   Prescriptions Last Dose Informant Patient Reported? Taking?   aspirin (ASA) 325 MG Tab 3/19  Yes Yes   Sig: Take 325 mg by mouth every 6 hours as needed for Mild Pain.      Facility-Administered Medications: None       Physical Exam  Temp:  [36.6 °C (97.8 °F)] 36.6 °C (97.8 °F)  Pulse:  [100] 100  Resp:  [18] 18  BP: (102)/(79) 102/79  SpO2:  [91 %] 91 %  Blood Pressure: 102/79   Temperature: 36.6 °C (97.8 °F)   Pulse: 100   Respiration: 18   Pulse Oximetry: 91 %       Physical Exam  Vitals reviewed.   Constitutional:       Appearance: Normal appearance. He is not diaphoretic.   HENT:      Head: Normocephalic and atraumatic.      " Nose: Nose normal.      Mouth/Throat:      Mouth: Mucous membranes are moist.      Pharynx: No oropharyngeal exudate.   Eyes:      General: No scleral icterus.        Right eye: No discharge.         Left eye: No discharge.      Extraocular Movements: Extraocular movements intact.      Conjunctiva/sclera: Conjunctivae normal.   Cardiovascular:      Rate and Rhythm: Normal rate and regular rhythm.      Pulses:           Radial pulses are 2+ on the right side and 2+ on the left side.        Dorsalis pedis pulses are 2+ on the right side and 2+ on the left side.      Heart sounds: No murmur heard.  Pulmonary:      Effort: Pulmonary effort is normal. No respiratory distress.      Breath sounds: Normal breath sounds. No wheezing or rales.   Abdominal:      General: Bowel sounds are normal. There is no distension.      Palpations: Abdomen is soft. There is no mass.      Tenderness: There is no abdominal tenderness. There is no guarding.      Comments: RLQ ileostomy bag with tan stool present.   Musculoskeletal:         General: No swelling or tenderness.      Cervical back: Neck supple. No muscular tenderness.      Right lower leg: No edema.      Left lower leg: No edema.   Lymphadenopathy:      Cervical: No cervical adenopathy.   Skin:     Coloration: Skin is not jaundiced or pale.      Comments: Well healed central abd surgical scar.   Neurological:      General: No focal deficit present.      Mental Status: He is alert and oriented to person, place, and time. Mental status is at baseline.      Cranial Nerves: No cranial nerve deficit.   Psychiatric:         Mood and Affect: Mood normal.         Behavior: Behavior normal.         Laboratory:          No results for input(s): ALTSGPT, ASTSGOT, ALKPHOSPHAT, TBILIRUBIN, DBILIRUBIN, GAMMAGT, AMYLASE, LIPASE, ALB, PREALBUMIN, GLUCOSE in the last 72 hours.      No results for input(s): NTPROBNP in the last 72 hours.      No results for input(s): TROPONINT in the last 72  hours.    Imaging:  No orders to display       CT abdomen from outside facility suggest SBO and a 3.7 by 3.9 cm fluid collection peripheral to bladder.    Assessment/Plan:  I anticipate this patient will require at least two midnights for appropriate medical management, necessitating inpatient admission.    * Small bowel obstruction (HCC)- (present on admission)  Assessment & Plan  Trial of ice chips and fluids.  If not further nausea advance diet.  Monitor ileostomy output.   Pain control  Antiemetics prn.  IV fluids for now  mobilize    Tobacco chew use  Assessment & Plan  Recommend cessation    Abdominal fluid collection  Assessment & Plan  Noted on outside hospital CT.  Uploading imaging into our imaging system.  Discussing with Gen Surgery  Per outside CT abd: 3.7 by 3.9 cm fluid collection posterior to bladder which is an increase from prior imaging study on 3/10/2022  Last abdominal surgery was 2020 for left inguinal repair.    Leukocytosis- (present on admission)  Assessment & Plan  From 3/20 WBC:18.2 at [facility].  Obtain new records.  S/P rocephine at [facility].      VTE prophylaxis: SCDs/TEDs and enoxaparin ppx

## 2022-03-21 NOTE — ASSESSMENT & PLAN NOTE
CT-guided retroperitoneal pelvic/presacral catheter drainage 3/22/2022  ID recommendation - IV Ertapenem until 5/9/2022  IR recommendation - flush drain daily  PICC line placed       Case management for discharge planning

## 2022-03-21 NOTE — CONSULTS
DATE OF CONSULTATION:  3/21/2022     REFERRING PHYSICIAN:   Willi Adam M.D.     CONSULTING PHYSICIAN:  Kvng Minor M.D.     REASON FOR CONSULTATION:  I have been asked by  to see the patient in surgical consultation for evaluation of SBO.    HISTORY OF PRESENT ILLNESS: The patient is a 51 year-old White man who presents to the Emergency Department with a 1- day history of moderate lower midline and left lower quadrant abdominal pain. The pain is associated with nausea, vomiting, abdominal distension and Lower abdominal pain and decreased output from ileostomy. The patient denies any recent or intercurrent illness. denies any history of ulcer, gastritis, cholangitis, pancreatitis, hepatitis, or previous abdominal ailments. The patient has undergone Multiple abdominal surgeries including total colectomy for recurrent colon cancer. Patient has had 3 episodes within the last month of obstructive symptoms.  Is noted to have a retrovesicular fluid collection on CT on 310 that they now say has increased in size...     PAST MEDICAL HISTORY:  has a past medical history of Cancer (HCC) and Small bowel obstruction (HCC).    PAST SURGICAL HISTORY:  has a past surgical history that includes other abdominal surgery and hydrocelectomy adult.    ALLERGIES:   Allergies   Allergen Reactions   • Hydromorphone Hives   • Cantaloupe Itching     Throat itches   • Tape Rash     Rash and blistering with prolonged exposure. Some tape causes immediate rash, burning. Paper tape OK per patient.       CURRENT MEDICATIONS:    Home Medications     Reviewed by Kathleen Lovelace (Pharmacy Tech) on 03/21/22 at 1310  Med List Status: Complete   Medication Last Dose Status        Patient Kingsley Taking any Medications                       FAMILY HISTORY: family history includes Cancer in his mother; Colon Cancer in his mother.    SOCIAL HISTORY:  reports that he quit smoking about 27 years ago. His smoking use included  "cigarettes. He smoked 1.00 pack per day. He has never used smokeless tobacco.    REVIEW OF SYSTEMS: Comprehensive review of systems is negative with the exception of the aforementioned HPI, PMH, and PSH bullets in accordance with CMS guidelines.    PHYSICAL EXAMINATION:      Constitutional:     Vital Signs: /79   Pulse 100   Temp 36.6 °C (97.8 °F) (Temporal)   Resp 18   Ht 1.88 m (6' 2.02\")   Wt 88.8 kg (195 lb 12.3 oz)   SpO2 91%    General Appearance: appears stated age, is in no apparent distress, is well developed and well nourished.  HEENT: The pupils are equal, round, and reactive to light bilaterally. The extraocular muscles are intact bilaterally.. The sclera are anicteric. Nares and oropharynx are clear.   Neck: Supple. No adenopathy.  Respiratory:   Inspection: Unlabored respirations, no intercostal retractions, paradoxical motion, or accessory muscle use.   Auscultation: normal.  Cardiovascular:   Inspection: The skin is warm, dry and well purfused.  Auscultation: Sinus tachycardia.   Peripheral Pulses: Normal.   Abdomen:  Inspection: Abdominal inspection reveals no abrasions, contusions, lacerations or penetrating wounds.  Right lower quadrant ileostomy with small amount of stool and gas present.  Well-healed midline incision.   Palpation: Palpation is remarkable for mild tenderness in the left lower quadrant and left groin region. No abdominal wall hernias.  Extremities:   Examination of the upper and lower extremities demonstrates no cyanosis edema or clubbing.  Neurologic:   Alert & oriented to person, time and place. Normal motor function. Normal sensory function. No focal deficits noted.    LABORATORY VALUES:                      IMAGING:   No orders to display       ASSESSMENT AND PLAN:     Small bowel obstruction (HCC)- (present on admission)  Assessment & Plan  Trial of ice chips and fluids  NG placement if N/V   Monitor ileostomy output.   IV fluids    Abdominal fluid " collection  Assessment & Plan  Noted on outside hospital CT.    Currently unable to uploading imaging into our imaging system.  CT abd: 3.7 by 3.9 cm fluid collection posterior to bladder which is an increase from prior imaging on 3/10  IR drainage     Leukocytosis- (present on admission)  Assessment & Plan  From 3/20 WBC:18.2 at [facility].  3/21 WBC 22         ____________________________________     Kvng Minor M.D.    DD: 3/21/2022  1:46 PM    ACS NSQIP Surgical Risk Calculator

## 2022-03-21 NOTE — PROGRESS NOTES
Med rec completed per patient at bedside.  Allergies reviewed with patient.  Patient denies using any prescription medications.  No vitamins/supplements and no recent over-the-counter medications.  No outpatient antibiotics in the last 30 days.  Patient's preferred pharmacy: [facility] in [place].

## 2022-03-21 NOTE — LETTER
March 24, 2022         Beau Mcintyre  [id number]  [facility] [place] [facility] [address]        To whom this may concern,    Beau Mcintyre is currently hospitalized for medical issues that will require ongoing hospitalization. Please allow this letter as an excuse for him missing his unemployment interview for Friday, 3/25/2022. If there are any questions, please call me at [phone].         Sincerely,    Suresh Garcia MD    Electronically Signed

## 2022-03-22 ENCOUNTER — APPOINTMENT (OUTPATIENT)
Dept: RADIOLOGY | Facility: MEDICAL CENTER | Age: 52
DRG: 372 | End: 2022-03-22
Attending: INTERNAL MEDICINE
Payer: COMMERCIAL

## 2022-03-22 LAB
ANION GAP SERPL CALC-SCNC: 8 MMOL/L (ref 7–16)
BASOPHILS # BLD AUTO: 0.4 % (ref 0–1.8)
BASOPHILS # BLD: 0.05 K/UL (ref 0–0.12)
BUN SERPL-MCNC: 11 MG/DL (ref 8–22)
CALCIUM SERPL-MCNC: 8.4 MG/DL (ref 8.5–10.5)
CFT BLD TEG: 4.8 MIN (ref 4.6–9.1)
CFT P HPASE BLD TEG: 4.7 MIN (ref 4.3–8.3)
CHLORIDE SERPL-SCNC: 101 MMOL/L (ref 96–112)
CLOT ANGLE BLD TEG: 74 DEGREES (ref 63–78)
CLOT LYSIS 30M P MA LENFR BLD TEG: 1.6 % (ref 0–2.6)
CO2 SERPL-SCNC: 27 MMOL/L (ref 20–33)
CREAT SERPL-MCNC: 0.87 MG/DL (ref 0.5–1.4)
CT.EXTRINSIC BLD ROTEM: 1.2 MIN (ref 0.8–2.1)
EOSINOPHIL # BLD AUTO: 0.16 K/UL (ref 0–0.51)
EOSINOPHIL NFR BLD: 1.4 % (ref 0–6.9)
ERYTHROCYTE [DISTWIDTH] IN BLOOD BY AUTOMATED COUNT: 45.4 FL (ref 35.9–50)
GFR SERPLBLD CREATININE-BSD FMLA CKD-EPI: 105 ML/MIN/1.73 M 2
GLUCOSE SERPL-MCNC: 102 MG/DL (ref 65–99)
HCT VFR BLD AUTO: 40.1 % (ref 42–52)
HGB BLD-MCNC: 13.5 G/DL (ref 14–18)
IMM GRANULOCYTES # BLD AUTO: 0.04 K/UL (ref 0–0.11)
IMM GRANULOCYTES NFR BLD AUTO: 0.3 % (ref 0–0.9)
LYMPHOCYTES # BLD AUTO: 1.56 K/UL (ref 1–4.8)
LYMPHOCYTES NFR BLD: 13.3 % (ref 22–41)
MCF BLD TEG: 64 MM (ref 52–69)
MCF.PLATELET INHIB BLD ROTEM: 36.7 MM (ref 15–32)
MCH RBC QN AUTO: 30.8 PG (ref 27–33)
MCHC RBC AUTO-ENTMCNC: 33.7 G/DL (ref 33.7–35.3)
MCV RBC AUTO: 91.6 FL (ref 81.4–97.8)
MONOCYTES # BLD AUTO: 0.61 K/UL (ref 0–0.85)
MONOCYTES NFR BLD AUTO: 5.2 % (ref 0–13.4)
NEUTROPHILS # BLD AUTO: 9.3 K/UL (ref 1.82–7.42)
NEUTROPHILS NFR BLD: 79.4 % (ref 44–72)
NRBC # BLD AUTO: 0 K/UL
NRBC BLD-RTO: 0 /100 WBC
PA AA BLD-ACNC: 0 % (ref 0–11)
PA ADP BLD-ACNC: 0.7 % (ref 0–17)
PLATELET # BLD AUTO: 313 K/UL (ref 164–446)
PMV BLD AUTO: 8.7 FL (ref 9–12.9)
POTASSIUM SERPL-SCNC: 3.6 MMOL/L (ref 3.6–5.5)
RBC # BLD AUTO: 4.38 M/UL (ref 4.7–6.1)
SODIUM SERPL-SCNC: 136 MMOL/L (ref 135–145)
TEG ALGORITHM TGALG: ABNORMAL
WBC # BLD AUTO: 11.7 K/UL (ref 4.8–10.8)

## 2022-03-22 PROCEDURE — 700102 HCHG RX REV CODE 250 W/ 637 OVERRIDE(OP): Performed by: HOSPITALIST

## 2022-03-22 PROCEDURE — A9270 NON-COVERED ITEM OR SERVICE: HCPCS | Performed by: HOSPITALIST

## 2022-03-22 PROCEDURE — 85384 FIBRINOGEN ACTIVITY: CPT

## 2022-03-22 PROCEDURE — 36415 COLL VENOUS BLD VENIPUNCTURE: CPT

## 2022-03-22 PROCEDURE — 700111 HCHG RX REV CODE 636 W/ 250 OVERRIDE (IP)

## 2022-03-22 PROCEDURE — 85576 BLOOD PLATELET AGGREGATION: CPT

## 2022-03-22 PROCEDURE — 85025 COMPLETE CBC W/AUTO DIFF WBC: CPT

## 2022-03-22 PROCEDURE — 700111 HCHG RX REV CODE 636 W/ 250 OVERRIDE (IP): Performed by: RADIOLOGY

## 2022-03-22 PROCEDURE — 99232 SBSQ HOSP IP/OBS MODERATE 35: CPT | Mod: FS | Performed by: NURSE PRACTITIONER

## 2022-03-22 PROCEDURE — 87205 SMEAR GRAM STAIN: CPT

## 2022-03-22 PROCEDURE — 99232 SBSQ HOSP IP/OBS MODERATE 35: CPT | Performed by: INTERNAL MEDICINE

## 2022-03-22 PROCEDURE — 700111 HCHG RX REV CODE 636 W/ 250 OVERRIDE (IP): Performed by: HOSPITALIST

## 2022-03-22 PROCEDURE — 87077 CULTURE AEROBIC IDENTIFY: CPT

## 2022-03-22 PROCEDURE — 700105 HCHG RX REV CODE 258: Performed by: HOSPITALIST

## 2022-03-22 PROCEDURE — 85347 COAGULATION TIME ACTIVATED: CPT

## 2022-03-22 PROCEDURE — 87070 CULTURE OTHR SPECIMN AEROBIC: CPT

## 2022-03-22 PROCEDURE — 87186 SC STD MICRODIL/AGAR DIL: CPT

## 2022-03-22 PROCEDURE — 0W9G30Z DRAINAGE OF PERITONEAL CAVITY WITH DRAINAGE DEVICE, PERCUTANEOUS APPROACH: ICD-10-PCS | Performed by: RADIOLOGY

## 2022-03-22 PROCEDURE — 770001 HCHG ROOM/CARE - MED/SURG/GYN PRIV*

## 2022-03-22 PROCEDURE — 77012 CT SCAN FOR NEEDLE BIOPSY: CPT

## 2022-03-22 PROCEDURE — 80048 BASIC METABOLIC PNL TOTAL CA: CPT

## 2022-03-22 RX ORDER — MIDAZOLAM HYDROCHLORIDE 1 MG/ML
.5-2 INJECTION INTRAMUSCULAR; INTRAVENOUS PRN
Status: ACTIVE | OUTPATIENT
Start: 2022-03-22 | End: 2022-03-22

## 2022-03-22 RX ORDER — SODIUM CHLORIDE 9 MG/ML
500 INJECTION, SOLUTION INTRAVENOUS
Status: ACTIVE | OUTPATIENT
Start: 2022-03-22 | End: 2022-03-22

## 2022-03-22 RX ORDER — MIDAZOLAM HYDROCHLORIDE 1 MG/ML
INJECTION INTRAMUSCULAR; INTRAVENOUS
Status: COMPLETED
Start: 2022-03-22 | End: 2022-03-22

## 2022-03-22 RX ORDER — ONDANSETRON 2 MG/ML
4 INJECTION INTRAMUSCULAR; INTRAVENOUS PRN
Status: ACTIVE | OUTPATIENT
Start: 2022-03-22 | End: 2022-03-22

## 2022-03-22 RX ADMIN — OXYCODONE 5 MG: 5 TABLET ORAL at 20:34

## 2022-03-22 RX ADMIN — OXYCODONE 5 MG: 5 TABLET ORAL at 16:58

## 2022-03-22 RX ADMIN — MIDAZOLAM HYDROCHLORIDE 1 MG: 1 INJECTION, SOLUTION INTRAMUSCULAR; INTRAVENOUS at 15:59

## 2022-03-22 RX ADMIN — MIDAZOLAM HYDROCHLORIDE 1 MG: 1 INJECTION, SOLUTION INTRAMUSCULAR; INTRAVENOUS at 16:03

## 2022-03-22 RX ADMIN — SODIUM CHLORIDE, POTASSIUM CHLORIDE, SODIUM LACTATE AND CALCIUM CHLORIDE: 600; 310; 30; 20 INJECTION, SOLUTION INTRAVENOUS at 23:34

## 2022-03-22 RX ADMIN — FENTANYL CITRATE 50 MCG: 50 INJECTION, SOLUTION INTRAMUSCULAR; INTRAVENOUS at 15:59

## 2022-03-22 RX ADMIN — OXYCODONE 5 MG: 5 TABLET ORAL at 23:33

## 2022-03-22 RX ADMIN — MORPHINE SULFATE 2 MG: 4 INJECTION INTRAVENOUS at 22:34

## 2022-03-22 RX ADMIN — FENTANYL CITRATE 50 MCG: 50 INJECTION, SOLUTION INTRAMUSCULAR; INTRAVENOUS at 16:05

## 2022-03-22 RX ADMIN — MORPHINE SULFATE 2 MG: 4 INJECTION INTRAVENOUS at 19:24

## 2022-03-22 ASSESSMENT — ENCOUNTER SYMPTOMS
NAUSEA: 0
DIARRHEA: 1
ABDOMINAL PAIN: 0
FEVER: 0
ABDOMINAL PAIN: 1
VOMITING: 0
PALPITATIONS: 0
CHILLS: 0
MYALGIAS: 0

## 2022-03-22 ASSESSMENT — PAIN DESCRIPTION - PAIN TYPE
TYPE: ACUTE PAIN
TYPE: ACUTE PAIN
TYPE: ACUTE PAIN;SURGICAL PAIN
TYPE: ACUTE PAIN
TYPE: ACUTE PAIN;SURGICAL PAIN
TYPE: ACUTE PAIN
TYPE: ACUTE PAIN;SURGICAL PAIN

## 2022-03-22 NOTE — PROGRESS NOTES
4 Eyes Skin Assessment Completed by Sandro Barry, RN and Tia Andrade, DAVIDSON.    Head WDL  Ears WDL  Nose WDL  Mouth WDL  Neck WDL  Breast/Chest WDL  Shoulder Blades WDL  Spine WDL  (R) Arm/Elbow/Hand WDL  (L) Arm/Elbow/Hand WDL  Abdomen WDL RLQ ileostomy  Groin WDL  Scrotum/Coccyx/Buttocks WDL  (R) Leg WDL  (L) Leg WDL  (R) Heel/Foot/Toe WDL  (L) Heel/Foot/Toe WDL          Devices In Places: n/a      Interventions In Place N/A    Possible Skin Injury No    Pictures Uploaded Into Epic N/A  Wound Consult Placed Yes  RN Wound Prevention Protocol Ordered No

## 2022-03-22 NOTE — OR SURGEON
Immediate Post- Operative Note        PostOp Diagnosis: PELVIC PRESCRAL FLUID COLLECTION. HX OF TOTAL COLECTOMY      Procedure(s): CT GUIDED CATHETER DRAINAGE WITH PLACEMENT OF 8F Wolof LOCKING LOOP CATHETER PRE-SACRAL SPACE VIA LEFT TRANSGLUTEAL APPROACH    FINDINGS: 3 CM A:F COLLECTION PRE-SACRAL. 8F LOCKING LOOP WITHIN COLLECTION    EVACUATION OF 10 ML INITIALLY BUTTERSCOTCH COLORED FLUID (RESEMBLES ENTERIC FLUID), THEN PINKISH OPAQUE FLUID C/W SOME BLEEDING INTO THE CAVITY    SUCTION BULB      Estimated Blood Loss: <5 CC        Complications: NONE        3/22/2022     4:41 PM     Ronnell Prado M.D.

## 2022-03-22 NOTE — PROGRESS NOTES
[facility] Medicine Daily Progress Note    Date of Service  3/22/2022    Chief Complaint  Beau Mcintyre is a 51 y.o. male admitted 3/21/2022 with h/o colorectal cancer and multiple surgeries with end ileostomy and recurrent SBO's presents from outside hospital with recurrent SBO and possible posterior bladder intra abdominal fluid collection    Hospital Course  No notes on file    Interval Problem Update  Intra-abdominal fluid collection-patient did not know he had this. Compared to prior CT's, worsening. Denies any specific pain in this area, but does endorse fevers at home.    SBO-no N/V, good liquid ileostomy output. ALways high liquid output per patient. Is hungry.     I have personally seen and examined the patient at bedside. I discussed the plan of care with patient.    Consultants/Specialty  general surgery    Code Status  Full Code    Disposition  Patient is not medically cleared for discharge.   Anticipate discharge to to home with close outpatient follow-up.  I have placed the appropriate orders for post-discharge needs.    Review of Systems  Review of Systems   Constitutional: Negative for chills and fever.   Cardiovascular: Negative for chest pain and palpitations.   Gastrointestinal: Positive for diarrhea (chronic). Negative for abdominal pain, nausea and vomiting.   Musculoskeletal: Negative for joint pain and myalgias.   All other systems reviewed and are negative.       Physical Exam  Temp:  [36 °C (96.8 °F)-37.5 °C (99.5 °F)] 36 °C (96.8 °F)  Pulse:  [] 83  Resp:  [17-18] 17  BP: ()/(61-76) 101/66  SpO2:  [91 %-93 %] 93 %    Physical Exam  Vitals and nursing note reviewed.   Constitutional:       General: He is not in acute distress.     Appearance: He is well-developed. He is ill-appearing.   HENT:      Head: Normocephalic and atraumatic.      Mouth/Throat:      Mouth: Mucous membranes are dry.      Pharynx: No oropharyngeal exudate.   Eyes:      General: No scleral icterus.     Pupils:  Pupils are equal, round, and reactive to light.   Neck:      Thyroid: No thyromegaly.   Cardiovascular:      Rate and Rhythm: Normal rate and regular rhythm.      Heart sounds: Normal heart sounds. No murmur heard.  Pulmonary:      Effort: Pulmonary effort is normal. No respiratory distress.      Breath sounds: Normal breath sounds. No wheezing.   Abdominal:      General: Bowel sounds are normal. There is no distension.      Palpations: Abdomen is soft.      Tenderness: There is no abdominal tenderness. There is no guarding or rebound.      Comments: Multiple surgical scars  Ileostomy present in the RLQ, dark/brown liquid output   Musculoskeletal:         General: No tenderness. Normal range of motion.      Cervical back: Normal range of motion and neck supple.   Skin:     General: Skin is warm and dry.      Findings: No rash.   Neurological:      Mental Status: He is alert and oriented to person, place, and time.      Cranial Nerves: No cranial nerve deficit.         Fluids    Intake/Output Summary (Last 24 hours) at 3/22/2022 1203  Last data filed at 3/22/2022 0720  Gross per 24 hour   Intake 170 ml   Output --   Net 170 ml       Laboratory  Recent Labs     03/21/22  1419 03/22/22  0452   WBC 22.1* 11.7*   RBC 5.26 4.38*   HEMOGLOBIN 16.4 13.5*   HEMATOCRIT 47.9 40.1*   MCV 91.1 91.6   MCH 31.2 30.8   MCHC 34.2 33.7   RDW 45.7 45.4   PLATELETCT 427 313   MPV 8.7* 8.7*     Recent Labs     03/21/22  1419 03/22/22  0452   SODIUM 139 136   POTASSIUM 4.4 3.6   CHLORIDE 103 101   CO2 23 27   GLUCOSE 129* 102*   BUN 13 11   CREATININE 0.98 0.87   CALCIUM 8.7 8.4*     Recent Labs     03/21/22  1419   INR 1.05               Imaging  OUTSIDE IMAGES-CT ABDOMEN /PELVIS   Final Result      IR-CONSULT AND TREAT    (Results Pending)        Assessment/Plan  * Small bowel obstruction (HCC)- (present on admission)  Assessment & Plan  Trial of ice chips and fluids.  Appears to be self resolving  GS following  Possible CLD after IR  consult  Continue IV fluids for high output ileostomy    Tobacco chew use- (present on admission)  Assessment & Plan  Recommend cessation    Abdominal fluid collection- (present on admission)  Assessment & Plan  Noted on outside hospital CT.  Uploading imaging into our imaging system.  Discussing with Gen Surgery  Per outside CT abd: 3.7 by 3.9 cm fluid collection posterior to bladder which is an increase from prior imaging study on 3/10/2022  Last abdominal surgery was 2020 for left inguinal repair.  IR consult, possible aspiration this PM    Leukocytosis- (present on admission)  Assessment & Plan  Down-trending, no clear e/o infection (does have intra-abdominal fluid collection, but unclear if infected)  Defer abx's for now  Trend WBC/fever curve       VTE prophylaxis: SCDs/TEDs    I have performed a physical exam and reviewed and updated ROS and Plan today (3/22/2022). In review of yesterday's note (3/21/2022), there are no changes except as documented above.

## 2022-03-22 NOTE — PROGRESS NOTES
Pt returned to unit from IR. Pt reports soreness. IR drain to left peritoneal/flank area. Pt requesting food.

## 2022-03-22 NOTE — PROGRESS NOTES
"         DATE: 3/22/2022    Hospital Day 1 small bowel obstruction.    INTERVAL EVENTS:  No N/V  Ostomy functioning   Minimal RLQ tenderness  IR drain pending   WBC trending down 11.7 (22.1)    REVIEW OF SYSTEMS:  Review of Systems   Gastrointestinal: Positive for abdominal pain.       PHYSICAL EXAMINATION:  Vital Signs: /65   Pulse 77   Temp 36.3 °C (97.4 °F) (Temporal)   Resp 17   Ht 1.88 m (6' 2.02\")   Wt 88.8 kg (195 lb 12.3 oz)   SpO2 97%   Physical Exam  Vitals and nursing note reviewed.   Constitutional:       General: He is not in acute distress.     Appearance: He is not ill-appearing.   HENT:      Mouth/Throat:      Mouth: Mucous membranes are moist.   Pulmonary:      Effort: Pulmonary effort is normal. No respiratory distress.   Abdominal:      General: There is no distension.      Palpations: Abdomen is soft.      Tenderness: There is abdominal tenderness (minimal RLQ).      Comments: Ostomy functioning    Musculoskeletal:      Comments: Ambulating   Skin:     General: Skin is warm and dry.      Capillary Refill: Capillary refill takes 2 to 3 seconds.   Neurological:      Mental Status: He is alert and oriented to person, place, and time.   Psychiatric:         Mood and Affect: Mood normal.         Behavior: Behavior normal.         Thought Content: Thought content normal.         LABORATORY VALUES:   Recent Labs     03/21/22  1419 03/22/22  0452   WBC 22.1* 11.7*   RBC 5.26 4.38*   HEMOGLOBIN 16.4 13.5*   HEMATOCRIT 47.9 40.1*   MCV 91.1 91.6   MCH 31.2 30.8   MCHC 34.2 33.7   RDW 45.7 45.4   PLATELETCT 427 313   MPV 8.7* 8.7*     Recent Labs     03/21/22  1419 03/22/22  0452   SODIUM 139 136   POTASSIUM 4.4 3.6   CHLORIDE 103 101   CO2 23 27   GLUCOSE 129* 102*   BUN 13 11   CREATININE 0.98 0.87   CALCIUM 8.7 8.4*     Recent Labs     03/21/22  1419   ASTSGOT 16   ALTSGPT 14   TBILIRUBIN 0.9   ALKPHOSPHAT 102*   GLOBULIN 3.5   INR 1.05     Recent Labs     03/21/22  1419   INR 1.05      "   IMAGING:   OUTSIDE IMAGES-CT ABDOMEN /PELVIS   Final Result      CT-DRAIN-PERITONEAL    (Results Pending)       Medications reviewed and Labs reviewed  Olivas catheter: No Olivas        DVT prophylaxis - mechanical: SCDs  Ulcer prophylaxis: Not indicated    Assessed for rehab: Patient returned to prior level of function, rehabilitation not indicated at this time      ASSESSMENT AND PLAN:   * Small bowel obstruction (HCC)- (present on admission)  Assessment & Plan  Medical management   NG for any N/V  Ice chips     Discussed patient condition with RN, Patient and Dr Minor.

## 2022-03-22 NOTE — PROGRESS NOTES
Pt presents to CT-IR. Pt was consented by MD at bedside, confirmed by this RN and consent at bedside. Pt transferred to CT table in prone position. Patient underwent a peritoneal drain placement by Dr. Prado. Procedure site was marked by MD and verified using imaging guidance. Pt placed on monitor, prepped and draped in a sterile fashion. Vitals were taken every 5 minutes and remained stable during procedure (see doc flow sheet for results). CO2 waveform capnography was monitored and remained WNL throughout procedure. Report called to Tia CEDEÑO. Pt transported by sha with RN to T408.  Fluid culture specimen hand delivered to lab.       LAYLA Flexima APDL locking pigtail [id number]  REF# [id number]  LOT# [id number]  Exp: 11/07/2024

## 2022-03-22 NOTE — CARE PLAN
The patient is Stable - Low risk of patient condition declining or worsening    Shift Goals  Clinical Goals: pain control, NPO  Patient Goals: pain control    Progress made toward(s) clinical / shift goals:  pain medicated per MAR, pt assessed for pain Q4h, pt educated on pain rating scales      Problem: Knowledge Deficit - Standard  Goal: Patient and family/care givers will demonstrate understanding of plan of care, disease process/condition, diagnostic tests and medications  Outcome: Progressing     Problem: Pain - Standard  Goal: Alleviation of pain or a reduction in pain to the patient’s comfort goal  Outcome: Progressing

## 2022-03-22 NOTE — PROGRESS NOTES
Received report from previous shift RN  Assessment complete.  A&O x 4. Patient calls appropriately.  Patient ambulates with no assistance required. Bed alarm off.   Patient has 2/10 pain. Pain managed with prescribed medications.  Denies N&V. Tolerating NPO diet.  RLQ ileostomy w/ green drainage.  + void, + flatus, + BM via ileostomy.  Patient denies SOB.  SCD's off.    Review plan of care with patient. Call light and personal belongings with in reach. Hourly rounding in place. All needs met at this time.

## 2022-03-22 NOTE — CARE PLAN
The patient is Stable - Low risk of patient condition declining or worsening    Shift Goals  Clinical Goals: pain control, NPO  Patient Goals: pain control    Progress made toward(s) clinical / shift goals:  pt pain medicated per MAR, pt remained NPO during shift, pt is able to demonstrate the ability to care for ileostomy.      Problem: Knowledge Deficit - Standard  Goal: Patient and family/care givers will demonstrate understanding of plan of care, disease process/condition, diagnostic tests and medications  3/22/2022 1321 by Sandro Barry R.N.  Outcome: Progressing       Problem: Pain - Standard  Goal: Alleviation of pain or a reduction in pain to the patient’s comfort goal  3/22/2022 1321 by Sandro Barry, R.N.  Outcome: Progressing    Problem: Self Care  Goal: Patient will have the ability to perform ADLs independently or with assistance (bathe, groom, dress, toilet and feed)  3/22/2022 1321 by Sandro Barry, R.N.  Outcome: Progressing

## 2022-03-22 NOTE — ASSESSMENT & PLAN NOTE
Medical management   NG for any N/V  Ice chips   3/23 Advance diet as tolerated   3/28 Tolerating regular diet

## 2022-03-22 NOTE — PROGRESS NOTES
Pt AO x 4  Vitals signs stable  Pt denies chest pain or SOB  O2 sat >90% on RA breathing unlabored   Pt endorses moderate to severe abd pain, PRN meds given.   Pt denies N/V/D  + voiding  + output from RLQ ileostomy, no abd distention noted  Pt ambulates self     Updated plan of care discussed with pt. Safety education done. Falls precautions in place.   Pt safety maintained. Hourly rounding done.

## 2022-03-23 LAB
ANION GAP SERPL CALC-SCNC: 15 MMOL/L (ref 7–16)
BASOPHILS # BLD AUTO: 0.4 % (ref 0–1.8)
BASOPHILS # BLD: 0.03 K/UL (ref 0–0.12)
BUN SERPL-MCNC: 7 MG/DL (ref 8–22)
CALCIUM SERPL-MCNC: 8.7 MG/DL (ref 8.5–10.5)
CHLORIDE SERPL-SCNC: 101 MMOL/L (ref 96–112)
CO2 SERPL-SCNC: 23 MMOL/L (ref 20–33)
CREAT SERPL-MCNC: 0.63 MG/DL (ref 0.5–1.4)
EOSINOPHIL # BLD AUTO: 0.11 K/UL (ref 0–0.51)
EOSINOPHIL NFR BLD: 1.5 % (ref 0–6.9)
ERYTHROCYTE [DISTWIDTH] IN BLOOD BY AUTOMATED COUNT: 42.2 FL (ref 35.9–50)
GFR SERPLBLD CREATININE-BSD FMLA CKD-EPI: 116 ML/MIN/1.73 M 2
GLUCOSE SERPL-MCNC: 86 MG/DL (ref 65–99)
GRAM STN SPEC: NORMAL
HCT VFR BLD AUTO: 37.1 % (ref 42–52)
HGB BLD-MCNC: 12.7 G/DL (ref 14–18)
IMM GRANULOCYTES # BLD AUTO: 0.03 K/UL (ref 0–0.11)
IMM GRANULOCYTES NFR BLD AUTO: 0.4 % (ref 0–0.9)
LYMPHOCYTES # BLD AUTO: 1.78 K/UL (ref 1–4.8)
LYMPHOCYTES NFR BLD: 24 % (ref 22–41)
MCH RBC QN AUTO: 31 PG (ref 27–33)
MCHC RBC AUTO-ENTMCNC: 34.2 G/DL (ref 33.7–35.3)
MCV RBC AUTO: 90.5 FL (ref 81.4–97.8)
MONOCYTES # BLD AUTO: 0.46 K/UL (ref 0–0.85)
MONOCYTES NFR BLD AUTO: 6.2 % (ref 0–13.4)
NEUTROPHILS # BLD AUTO: 5.02 K/UL (ref 1.82–7.42)
NEUTROPHILS NFR BLD: 67.5 % (ref 44–72)
NRBC # BLD AUTO: 0 K/UL
NRBC BLD-RTO: 0 /100 WBC
PLATELET # BLD AUTO: 275 K/UL (ref 164–446)
PMV BLD AUTO: 8.8 FL (ref 9–12.9)
POTASSIUM SERPL-SCNC: 3.3 MMOL/L (ref 3.6–5.5)
RBC # BLD AUTO: 4.1 M/UL (ref 4.7–6.1)
SIGNIFICANT IND 70042: NORMAL
SITE SITE: NORMAL
SODIUM SERPL-SCNC: 139 MMOL/L (ref 135–145)
SOURCE SOURCE: NORMAL
WBC # BLD AUTO: 7.4 K/UL (ref 4.8–10.8)

## 2022-03-23 PROCEDURE — 700111 HCHG RX REV CODE 636 W/ 250 OVERRIDE (IP): Performed by: HOSPITALIST

## 2022-03-23 PROCEDURE — 700102 HCHG RX REV CODE 250 W/ 637 OVERRIDE(OP): Performed by: INTERNAL MEDICINE

## 2022-03-23 PROCEDURE — 36415 COLL VENOUS BLD VENIPUNCTURE: CPT

## 2022-03-23 PROCEDURE — 700102 HCHG RX REV CODE 250 W/ 637 OVERRIDE(OP): Performed by: HOSPITALIST

## 2022-03-23 PROCEDURE — 80048 BASIC METABOLIC PNL TOTAL CA: CPT

## 2022-03-23 PROCEDURE — 770001 HCHG ROOM/CARE - MED/SURG/GYN PRIV*

## 2022-03-23 PROCEDURE — 99232 SBSQ HOSP IP/OBS MODERATE 35: CPT | Performed by: INTERNAL MEDICINE

## 2022-03-23 PROCEDURE — 85025 COMPLETE CBC W/AUTO DIFF WBC: CPT

## 2022-03-23 PROCEDURE — 99232 SBSQ HOSP IP/OBS MODERATE 35: CPT | Mod: FS | Performed by: NURSE PRACTITIONER

## 2022-03-23 PROCEDURE — A9270 NON-COVERED ITEM OR SERVICE: HCPCS | Performed by: HOSPITALIST

## 2022-03-23 PROCEDURE — A9270 NON-COVERED ITEM OR SERVICE: HCPCS | Performed by: INTERNAL MEDICINE

## 2022-03-23 RX ORDER — POTASSIUM CHLORIDE 20 MEQ/1
20 TABLET, EXTENDED RELEASE ORAL 2 TIMES DAILY
Status: COMPLETED | OUTPATIENT
Start: 2022-03-23 | End: 2022-03-24

## 2022-03-23 RX ADMIN — OXYCODONE 5 MG: 5 TABLET ORAL at 21:09

## 2022-03-23 RX ADMIN — SENNOSIDES AND DOCUSATE SODIUM 2 TABLET: 50; 8.6 TABLET ORAL at 16:26

## 2022-03-23 RX ADMIN — SENNOSIDES AND DOCUSATE SODIUM 2 TABLET: 50; 8.6 TABLET ORAL at 04:24

## 2022-03-23 RX ADMIN — POTASSIUM CHLORIDE 20 MEQ: 1500 TABLET, EXTENDED RELEASE ORAL at 16:26

## 2022-03-23 RX ADMIN — MORPHINE SULFATE 2 MG: 4 INJECTION INTRAVENOUS at 01:30

## 2022-03-23 RX ADMIN — OXYCODONE 5 MG: 5 TABLET ORAL at 02:48

## 2022-03-23 RX ADMIN — OXYCODONE 5 MG: 5 TABLET ORAL at 05:40

## 2022-03-23 RX ADMIN — MORPHINE SULFATE 2 MG: 4 INJECTION INTRAVENOUS at 04:25

## 2022-03-23 RX ADMIN — OXYCODONE 5 MG: 5 TABLET ORAL at 16:25

## 2022-03-23 RX ADMIN — OXYCODONE 5 MG: 5 TABLET ORAL at 09:23

## 2022-03-23 RX ADMIN — ENOXAPARIN SODIUM 40 MG: 40 INJECTION SUBCUTANEOUS at 16:26

## 2022-03-23 RX ADMIN — POTASSIUM CHLORIDE 20 MEQ: 1500 TABLET, EXTENDED RELEASE ORAL at 09:09

## 2022-03-23 ASSESSMENT — ENCOUNTER SYMPTOMS
ABDOMINAL PAIN: 0
FEVER: 0
HEADACHES: 0
MYALGIAS: 0
BLURRED VISION: 0
DIZZINESS: 0
BACK PAIN: 1
VOMITING: 0
SHORTNESS OF BREATH: 0
ROS GI COMMENTS: COLOSTOMY
BRUISES/BLEEDS EASILY: 0
CHILLS: 0
WEAKNESS: 0
COUGH: 0
NAUSEA: 0
PALPITATIONS: 0
HEMOPTYSIS: 0
CONSTIPATION: 0
DIARRHEA: 1

## 2022-03-23 ASSESSMENT — PAIN DESCRIPTION - PAIN TYPE
TYPE: ACUTE PAIN
TYPE: ACUTE PAIN;SURGICAL PAIN

## 2022-03-23 NOTE — PROGRESS NOTES
[facility] Medicine Daily Progress Note    Date of Service  3/23/2022    Chief Complaint  Beau Mcintyre is a 51 y.o. male admitted 3/21/2022 with h/o colorectal cancer and multiple surgeries with end ileostomy and recurrent SBO's presents from outside hospital with recurrent SBO and possible posterior bladder intra abdominal fluid collection    Hospital Course  No notes on file    Interval Problem Update  Intra-abdominal fluid collection-s/p IR drain placement on 3/22. Appears to have enteric contents and possible fistulous communication with the small bowel. Denies any abdominal pain in this area. Fluid collection has been noted on prior CT scans so some element of chronicity. Denies any issues with ostomy output or urination.    SBO-appears to have resolved. Tolerated CLD.    I have personally seen and examined the patient at bedside. I discussed the plan of care with patient.    Consultants/Specialty  general surgery    Code Status  Full Code    Disposition  Patient is not medically cleared for discharge.   Anticipate discharge to to home with close outpatient follow-up.  I have placed the appropriate orders for post-discharge needs.    Review of Systems  Review of Systems   Constitutional: Negative for chills and fever.   Cardiovascular: Negative for chest pain and palpitations.   Gastrointestinal: Positive for diarrhea (chronic). Negative for abdominal pain, nausea and vomiting.        No new GI symptoms to report today   Musculoskeletal: Negative for joint pain and myalgias.   All other systems reviewed and are negative.       Physical Exam  Temp:  [36.1 °C (97 °F)-36.8 °C (98.3 °F)] 36.8 °C (98.3 °F)  Pulse:  [63-96] 72  Resp:  [12-18] 17  BP: ()/(57-78) 101/63  SpO2:  [93 %-99 %] 94 %    Physical Exam  Vitals and nursing note reviewed.   Constitutional:       General: He is not in acute distress.     Appearance: He is well-developed. He is ill-appearing (chronically).   HENT:      Head: Normocephalic  and atraumatic.      Mouth/Throat:      Mouth: Mucous membranes are dry.      Pharynx: No oropharyngeal exudate.   Eyes:      General: No scleral icterus.     Pupils: Pupils are equal, round, and reactive to light.   Neck:      Thyroid: No thyromegaly.   Cardiovascular:      Rate and Rhythm: Normal rate and regular rhythm.      Heart sounds: Normal heart sounds. No murmur heard.  Pulmonary:      Effort: Pulmonary effort is normal. No respiratory distress.      Breath sounds: Normal breath sounds. No wheezing.   Abdominal:      General: Bowel sounds are normal. There is no distension.      Palpations: Abdomen is soft.      Tenderness: There is no abdominal tenderness. There is no guarding or rebound.      Comments: Multiple surgical scars  Ileostomy present in the RLQ, dark/brown liquid output  No clear interval changes noted today  NETTA drain in place with some possible enteric contents present   Musculoskeletal:         General: No tenderness. Normal range of motion.      Cervical back: Normal range of motion and neck supple.   Skin:     General: Skin is warm and dry.      Findings: No rash.   Neurological:      Mental Status: He is alert and oriented to person, place, and time.      Cranial Nerves: No cranial nerve deficit.         Fluids    Intake/Output Summary (Last 24 hours) at 3/23/2022 1253  Last data filed at 3/23/2022 1147  Gross per 24 hour   Intake 633.75 ml   Output 2480 ml   Net -1846.25 ml       Laboratory  Recent Labs     03/21/22  1419 03/22/22  0452 03/23/22  0131   WBC 22.1* 11.7* 7.4   RBC 5.26 4.38* 4.10*   HEMOGLOBIN 16.4 13.5* 12.7*   HEMATOCRIT 47.9 40.1* 37.1*   MCV 91.1 91.6 90.5   MCH 31.2 30.8 31.0   MCHC 34.2 33.7 34.2   RDW 45.7 45.4 42.2   PLATELETCT 427 313 275   MPV 8.7* 8.7* 8.8*     Recent Labs     03/21/22  1419 03/22/22  0452 03/23/22  0131   SODIUM 139 136 139   POTASSIUM 4.4 3.6 3.3*   CHLORIDE 103 101 101   CO2 23 27 23   GLUCOSE 129* 102* 86   BUN 13 11 7*   CREATININE 0.98  0.87 0.63   CALCIUM 8.7 8.4* 8.7     Recent Labs     03/21/22  1419   INR 1.05               Imaging  CT-DRAIN-PERITONEAL   Final Result      1.  CT GUIDED RETROPERITONEAL PELVIC/PRESACRAL CATHETER DRAINAGE. INITIAL FLUID RETURN RESEMBLED ENTERIC CONTENTS. A FISTULOUS LEAK MAY BE PRESENT. LIKEWISE, THE AIR CONTENT SUGGESTS THE POSSIBILITY OF COMMUNICATION WITH BOWEL.   2.  THE CURRENT PLAN IS TO MONITOR DRAINAGE OUTPUT, IRRIGATE DAILY AND OBTAIN A FOLLOWUP CT SCAN IN 5-7 DAYS IF CLINICALLY INDICATED.   3. ABSCESSOGRAM MAY BE OF INTEREST PRIOR TO CATHETER REMOVAL TO DETERMINE IF THERE IS FISTULOUS COMMUNICATION WITH SMALL BOWEL IN THE PELVIS.      OUTSIDE IMAGES-CT ABDOMEN /PELVIS   Final Result           Assessment/Plan  * Small bowel obstruction (HCC)- (present on admission)  Assessment & Plan  Resolved  Good ostomy output  Advancing diet slowly, now on FLD  GS has signed for now    Tobacco chew use- (present on admission)  Assessment & Plan  Recommend cessation    Abdominal fluid collection- (present on admission)  Assessment & Plan  Noted on outside hospital CT.  Uploading imaging into our imaging system.  Discussing with Gen Surgery  Per outside CT abd: 3.7 by 3.9 cm fluid collection posterior to bladder which is an increase from prior imaging study on 3/10/2022  IR drain placed 3/22, possible fistulous communication with bowel, but likely chronic  Does not appear infected, tolerating FLD, good ostomy output and normal urination  Keep drain for now, fluid cultures are NGTD  Likely pull soon and possible repeat CT scan in 2-3 days    Leukocytosis- (present on admission)  Assessment & Plan  Resolved, no clear e/o infection (does have intra-abdominal fluid collection, does not appear infected at this time)  Defer abx's for now  Trend WBC/fever curve    Hypokalemia- (present on admission)  Assessment & Plan  Mild, replacement, daily labs       VTE prophylaxis: lovenox    I have performed a physical exam and reviewed  and updated ROS and Plan today (3/23/2022). In review of yesterday's note (3/22/2022), there are no changes except as documented above.

## 2022-03-23 NOTE — PROGRESS NOTES
Received report from previous shift RN.  Assessment complete.  Patient A&O x 4. Patient calls appropriately.  Patient ambulates with no assist.   Patient has 7-8/10 pain. Pain managed with prescribed medications.  Denies N&V. Tolerating clear liquid diet.  LR running at 75 mL/hr.  Surgical L flank IR drain to bulb suction, requires frequent emptying.  + void, + flatus, - BM.  Patient on RA, denies SOB.     Reviewed plan of care with patient. Call light and personal belongings within reach. Hourly rounding in place. All needs met at this time.

## 2022-03-23 NOTE — WOUND TEAM
Wound team consulted for established ileostomy. Patient declined wound team assistance and is independent with his ostomy care. Patient just requested extra ostomy supplies. Consult completed.     The following supplies obtained from OP wound: 1.75 2 PC with convex and peggy ring. Supplies given to patient. Patient to call wound team if he runs out of supplies.

## 2022-03-23 NOTE — PROGRESS NOTES
"Radiology Progress Note   Author: NGHIA Perea Date & Time created: 3/23/2022  1:41 PM   Date of admission  3/21/2022  Note to reader: this note follows the APSO format rather than the historical SOAP format. Assessment and plan located at the top of the note for ease of use.    Chief Complaint  51 y.o. male admitted 3/21/2022 with abd pain     HPI  \" Beau Mcintyre is a 51 y.o. male admitted 3/21/2022 with h/o colorectal cancer and multiple surgeries with end ileostomy and recurrent SBO's presents from outside hospital with recurrent SBO and possible posterior bladder intra abdominal fluid collection\"      Assessment/Plan  Interval History   Principal Problem:    Small bowel obstruction (HCC)  Active Problems:    Hypokalemia    Leukocytosis    Abdominal fluid collection    Tobacco chew use      Plan IR  - Irrigate Left flank drain with 10 ml of sterile saline twice per shift   - Fluid cultures pending   - Surgery following   - Recommend follow up CT scan in 5-7 days 3/26 or sooner if drain output decreases to less than 20 ml over 24 hrs    - Continue to monitor drains, VS and labs        IR:   3/22- Left posterior pelvic drain placed, 270 ml output    3/23-  65 ml in am of bloody brown drainage. Leukocytosis resolved 7.4, CO discomfort at drain site. Drain site CDI, no redness or swelling          Review of Systems  Physical Exam   Review of Systems   Constitutional: Positive for malaise/fatigue. Negative for chills and fever.   HENT: Negative for hearing loss.    Eyes: Negative for blurred vision.   Respiratory: Negative for cough, hemoptysis and shortness of breath.    Cardiovascular: Negative for chest pain and palpitations.   Gastrointestinal: Negative for abdominal pain and vomiting.        Colostomy    Genitourinary: Negative for dysuria.   Musculoskeletal: Positive for back pain. Negative for myalgias.        Discomfort at drain site    Skin: Negative for rash.   Neurological: Negative for " dizziness, weakness and headaches.   Endo/Heme/Allergies: Does not bruise/bleed easily.   Psychiatric/Behavioral: Negative for suicidal ideas.      Vitals:    03/23/22 1133   BP: 101/63   Pulse: 72   Resp: 17   Temp: 36.8 °C (98.3 °F)   SpO2: 94%        Physical Exam  Constitutional:       Appearance: Normal appearance.   HENT:      Head: Normocephalic.      Nose: Nose normal.      Mouth/Throat:      Mouth: Mucous membranes are moist.   Eyes:      Pupils: Pupils are equal, round, and reactive to light.   Cardiovascular:      Rate and Rhythm: Normal rate.   Pulmonary:      Effort: Pulmonary effort is normal. No respiratory distress.   Abdominal:      Palpations: Abdomen is soft.      Tenderness: There is no abdominal tenderness.      Comments: Colostomy    Musculoskeletal:         General: No tenderness or deformity.      Cervical back: Normal range of motion.        Back:    Skin:     General: Skin is warm and dry.      Capillary Refill: Capillary refill takes less than 2 seconds.      Coloration: Skin is not jaundiced or pale.   Neurological:      General: No focal deficit present.      Mental Status: He is alert.      Motor: No weakness.   Psychiatric:         Mood and Affect: Mood normal.         Behavior: Behavior normal.             Labs    Recent Labs     03/21/22  1419 03/22/22  0452 03/23/22  0131   WBC 22.1* 11.7* 7.4   RBC 5.26 4.38* 4.10*   HEMOGLOBIN 16.4 13.5* 12.7*   HEMATOCRIT 47.9 40.1* 37.1*   MCV 91.1 91.6 90.5   MCH 31.2 30.8 31.0   MCHC 34.2 33.7 34.2   RDW 45.7 45.4 42.2   PLATELETCT 427 313 275   MPV 8.7* 8.7* 8.8*     Recent Labs     03/21/22  1419 03/22/22  0452 03/23/22  0131   SODIUM 139 136 139   POTASSIUM 4.4 3.6 3.3*   CHLORIDE 103 101 101   CO2 23 27 23   GLUCOSE 129* 102* 86   BUN 13 11 7*   CREATININE 0.98 0.87 0.63   CALCIUM 8.7 8.4* 8.7     Recent Labs     03/21/22  1419 03/22/22  0452 03/23/22  0131   ALBUMIN 3.5  --   --    TBILIRUBIN 0.9  --   --    ALKPHOSPHAT 102*  --   --     TOTPROTEIN 7.0  --   --    ALTSGPT 14  --   --    ASTSGOT 16  --   --    CREATININE 0.98 0.87 0.63     CT-DRAIN-PERITONEAL   Final Result      1.  CT GUIDED RETROPERITONEAL PELVIC/PRESACRAL CATHETER DRAINAGE. INITIAL FLUID RETURN RESEMBLED ENTERIC CONTENTS. A FISTULOUS LEAK MAY BE PRESENT. LIKEWISE, THE AIR CONTENT SUGGESTS THE POSSIBILITY OF COMMUNICATION WITH BOWEL.   2.  THE CURRENT PLAN IS TO MONITOR DRAINAGE OUTPUT, IRRIGATE DAILY AND OBTAIN A FOLLOWUP CT SCAN IN 5-7 DAYS IF CLINICALLY INDICATED.   3. ABSCESSOGRAM MAY BE OF INTEREST PRIOR TO CATHETER REMOVAL TO DETERMINE IF THERE IS FISTULOUS COMMUNICATION WITH SMALL BOWEL IN THE PELVIS.      OUTSIDE IMAGES-CT ABDOMEN /PELVIS   Final Result          INR   Date Value Ref Range Status   03/21/2022 1.05 0.87 - 1.13 Final     Comment:     INR - Non-therapeutic Reference Range: 0.87-1.13  INR - Therapeutic Reference Range: 2.0-4.0       No results found for: POCINR     Intake/Output Summary (Last 24 hours) at 3/23/2022 1341  Last data filed at 3/23/2022 1147  Gross per 24 hour   Intake 633.75 ml   Output 2610 ml   Net -1976.25 ml      Labs not explicitly included in this progress note were reviewed by the author. Radiology/imaging not explicitly included in this progress note was reviewed by the author.     I have performed a physical exam and reviewed and updated ROS and Plan today (3/23/2022).     15 minutes in directly providing and coordinating care and extensive data review.  No time overlap and excludes procedures.

## 2022-03-23 NOTE — PROGRESS NOTES
"      DATE: 3/23/2022    Hospital Day 2 Small bowel obstruction.    INTERVAL EVENTS:  IR drain placed - brown in color  Tolerating clears  Advance diet as tolerated   No N/V  Belly overall soft  Ostomy producing    Nothing further from surgical perspective.   Surgery signing off.     REVIEW OF SYSTEMS:  Review of Systems   Gastrointestinal: Negative for abdominal pain and constipation.   Genitourinary:        Voiding        PHYSICAL EXAMINATION:  Vital Signs: BP (!) 97/60   Pulse 67   Temp 36.6 °C (97.8 °F) (Temporal)   Resp 18   Ht 1.88 m (6' 2.02\")   Wt 88.8 kg (195 lb 12.3 oz)   SpO2 96%   Physical Exam  Vitals and nursing note reviewed.   Constitutional:       General: He is not in acute distress.     Appearance: He is not ill-appearing.   HENT:      Mouth/Throat:      Mouth: Mucous membranes are moist.   Pulmonary:      Effort: Pulmonary effort is normal. No respiratory distress.   Abdominal:      General: There is no distension.      Palpations: Abdomen is soft.      Tenderness: There is abdominal tenderness (minimal RLQ).      Comments: Ostomy functioning    Musculoskeletal:      Comments: Ambulating   Skin:     General: Skin is warm and dry.      Capillary Refill: Capillary refill takes 2 to 3 seconds.   Neurological:      Mental Status: He is alert and oriented to person, place, and time.   Psychiatric:         Mood and Affect: Mood normal.         Behavior: Behavior normal.         Thought Content: Thought content normal.         LABORATORY VALUES:   Recent Labs     03/21/22  1419 03/22/22  0452 03/23/22  0131   WBC 22.1* 11.7* 7.4   RBC 5.26 4.38* 4.10*   HEMOGLOBIN 16.4 13.5* 12.7*   HEMATOCRIT 47.9 40.1* 37.1*   MCV 91.1 91.6 90.5   MCH 31.2 30.8 31.0   MCHC 34.2 33.7 34.2   RDW 45.7 45.4 42.2   PLATELETCT 427 313 275   MPV 8.7* 8.7* 8.8*     Recent Labs     03/21/22  1419 03/22/22  0452 03/23/22  0131   SODIUM 139 136 139   POTASSIUM 4.4 3.6 3.3*   CHLORIDE 103 101 101   CO2 23 27 23   GLUCOSE " 129* 102* 86   BUN 13 11 7*   CREATININE 0.98 0.87 0.63   CALCIUM 8.7 8.4* 8.7     Recent Labs     03/21/22  1419   ASTSGOT 16   ALTSGPT 14   TBILIRUBIN 0.9   ALKPHOSPHAT 102*   GLOBULIN 3.5   INR 1.05     Recent Labs     03/21/22  1419   INR 1.05        IMAGING:   CT-DRAIN-PERITONEAL   Final Result      1.  CT GUIDED RETROPERITONEAL PELVIC/PRESACRAL CATHETER DRAINAGE. INITIAL FLUID RETURN RESEMBLED ENTERIC CONTENTS. A FISTULOUS LEAK MAY BE PRESENT. LIKEWISE, THE AIR CONTENT SUGGESTS THE POSSIBILITY OF COMMUNICATION WITH BOWEL.   2.  THE CURRENT PLAN IS TO MONITOR DRAINAGE OUTPUT, IRRIGATE DAILY AND OBTAIN A FOLLOWUP CT SCAN IN 5-7 DAYS IF CLINICALLY INDICATED.   3. ABSCESSOGRAM MAY BE OF INTEREST PRIOR TO CATHETER REMOVAL TO DETERMINE IF THERE IS FISTULOUS COMMUNICATION WITH SMALL BOWEL IN THE PELVIS.      OUTSIDE IMAGES-CT ABDOMEN /PELVIS   Final Result          Medications reviewed and Labs reviewed  Olivas catheter: No Olivas        DVT prophylaxis - mechanical: SCDs  Ulcer prophylaxis: Not indicated    Assessed for rehab: Patient returned to prior level of function, rehabilitation not indicated at this time      ASSESSMENT AND PLAN:   * Small bowel obstruction (HCC)- (present on admission)  Assessment & Plan  Medical management   NG for any N/V  Ice chips   3/23 Advance diet as tolerated   Discussed patient condition with RN, Patient and Dr. Minor.

## 2022-03-23 NOTE — PROGRESS NOTES
Bedside report received.  Assessment complete.  A&O x 4. Patient calls appropriately.   Patient medicated for pain to Left hip where IR drain was placed, drain flushed with 10 cc.  Denies N&V. Tolerating diet.  + void, established ileostomy to RLQ with green watery output.  Patient denies SOB.  Review plan with of care with patient. Call light and personal belongings with in reach. Hourly rounding in place. All needs met at this time.

## 2022-03-23 NOTE — CARE PLAN
The patient is Stable - Low risk of patient condition declining or worsening    Shift Goals  Clinical Goals: drain management, pain management  Patient Goals: pain management, sleep    Progress made toward(s) clinical / shift goals:  Pt's 7-8/10 pain managed with PRN pain medications and rest. Pain medications available discussed with pt. Bed locked and in lowest position, call light and belongings within reach, pt resting comfortably in bed and able to sleep throughout the night, all needs met at this time. L flank NETTA drain to bulb suction, emptied frequently by staff (see flowsheets). Dressing in place, CDI.  Problem: Knowledge Deficit - Standard  Goal: Patient and family/care givers will demonstrate understanding of plan of care, disease process/condition, diagnostic tests and medications  Outcome: Progressing     Problem: Pain - Standard  Goal: Alleviation of pain or a reduction in pain to the patient’s comfort goal  Outcome: Progressing     Problem: Communication  Goal: The ability to communicate needs accurately and effectively will improve  Outcome: Progressing     Problem: Nutrition  Goal: Patient's nutritional and fluid intake will be adequate or improve  Outcome: Progressing     Problem: Urinary Elimination  Goal: Establish and maintain regular urinary output  Outcome: Progressing     Problem: Mobility  Goal: Patient's capacity to carry out activities will improve  Outcome: Progressing     Problem: Infection - Standard  Goal: Patient will remain free from infection  Outcome: Progressing     Problem: Wound/ / Incision Healing  Goal: Patient's wound/surgical incision will decrease in size and heals properly  Outcome: Progressing       Patient is not progressing towards the following goals:

## 2022-03-24 ENCOUNTER — APPOINTMENT (OUTPATIENT)
Dept: RADIOLOGY | Facility: MEDICAL CENTER | Age: 52
DRG: 372 | End: 2022-03-24
Attending: SURGERY
Payer: COMMERCIAL

## 2022-03-24 LAB
ANION GAP SERPL CALC-SCNC: 12 MMOL/L (ref 7–16)
BACTERIA FLD AEROBE CULT: ABNORMAL
BACTERIA FLD AEROBE CULT: ABNORMAL
BASOPHILS # BLD AUTO: 0.5 % (ref 0–1.8)
BASOPHILS # BLD: 0.03 K/UL (ref 0–0.12)
BUN SERPL-MCNC: 5 MG/DL (ref 8–22)
CALCIUM SERPL-MCNC: 9 MG/DL (ref 8.5–10.5)
CHLORIDE SERPL-SCNC: 101 MMOL/L (ref 96–112)
CO2 SERPL-SCNC: 27 MMOL/L (ref 20–33)
CREAT SERPL-MCNC: 0.84 MG/DL (ref 0.5–1.4)
EOSINOPHIL # BLD AUTO: 0.07 K/UL (ref 0–0.51)
EOSINOPHIL NFR BLD: 1.1 % (ref 0–6.9)
ERYTHROCYTE [DISTWIDTH] IN BLOOD BY AUTOMATED COUNT: 42.6 FL (ref 35.9–50)
GFR SERPLBLD CREATININE-BSD FMLA CKD-EPI: 106 ML/MIN/1.73 M 2
GLUCOSE SERPL-MCNC: 97 MG/DL (ref 65–99)
GRAM STN SPEC: ABNORMAL
HCT VFR BLD AUTO: 38.3 % (ref 42–52)
HGB BLD-MCNC: 13.3 G/DL (ref 14–18)
IMM GRANULOCYTES # BLD AUTO: 0.02 K/UL (ref 0–0.11)
IMM GRANULOCYTES NFR BLD AUTO: 0.3 % (ref 0–0.9)
LYMPHOCYTES # BLD AUTO: 1.56 K/UL (ref 1–4.8)
LYMPHOCYTES NFR BLD: 25.2 % (ref 22–41)
MAGNESIUM SERPL-MCNC: 1.9 MG/DL (ref 1.5–2.5)
MCH RBC QN AUTO: 31.1 PG (ref 27–33)
MCHC RBC AUTO-ENTMCNC: 34.7 G/DL (ref 33.7–35.3)
MCV RBC AUTO: 89.7 FL (ref 81.4–97.8)
MONOCYTES # BLD AUTO: 0.46 K/UL (ref 0–0.85)
MONOCYTES NFR BLD AUTO: 7.4 % (ref 0–13.4)
NEUTROPHILS # BLD AUTO: 4.04 K/UL (ref 1.82–7.42)
NEUTROPHILS NFR BLD: 65.5 % (ref 44–72)
NRBC # BLD AUTO: 0 K/UL
NRBC BLD-RTO: 0 /100 WBC
PLATELET # BLD AUTO: 327 K/UL (ref 164–446)
PMV BLD AUTO: 8.9 FL (ref 9–12.9)
POTASSIUM SERPL-SCNC: 3.5 MMOL/L (ref 3.6–5.5)
RBC # BLD AUTO: 4.27 M/UL (ref 4.7–6.1)
SIGNIFICANT IND 70042: ABNORMAL
SITE SITE: ABNORMAL
SODIUM SERPL-SCNC: 140 MMOL/L (ref 135–145)
SOURCE SOURCE: ABNORMAL
WBC # BLD AUTO: 6.2 K/UL (ref 4.8–10.8)

## 2022-03-24 PROCEDURE — 770001 HCHG ROOM/CARE - MED/SURG/GYN PRIV*

## 2022-03-24 PROCEDURE — 99232 SBSQ HOSP IP/OBS MODERATE 35: CPT | Mod: FS | Performed by: NURSE PRACTITIONER

## 2022-03-24 PROCEDURE — 36415 COLL VENOUS BLD VENIPUNCTURE: CPT

## 2022-03-24 PROCEDURE — 83735 ASSAY OF MAGNESIUM: CPT

## 2022-03-24 PROCEDURE — 85025 COMPLETE CBC W/AUTO DIFF WBC: CPT

## 2022-03-24 PROCEDURE — 700102 HCHG RX REV CODE 250 W/ 637 OVERRIDE(OP): Performed by: INTERNAL MEDICINE

## 2022-03-24 PROCEDURE — G1004 CDSM NDSC: HCPCS

## 2022-03-24 PROCEDURE — 99232 SBSQ HOSP IP/OBS MODERATE 35: CPT | Performed by: INTERNAL MEDICINE

## 2022-03-24 PROCEDURE — A9270 NON-COVERED ITEM OR SERVICE: HCPCS | Performed by: INTERNAL MEDICINE

## 2022-03-24 PROCEDURE — 99223 1ST HOSP IP/OBS HIGH 75: CPT | Performed by: INTERNAL MEDICINE

## 2022-03-24 PROCEDURE — A9270 NON-COVERED ITEM OR SERVICE: HCPCS | Performed by: HOSPITALIST

## 2022-03-24 PROCEDURE — 700117 HCHG RX CONTRAST REV CODE 255: Performed by: SURGERY

## 2022-03-24 PROCEDURE — 700102 HCHG RX REV CODE 250 W/ 637 OVERRIDE(OP): Performed by: HOSPITALIST

## 2022-03-24 PROCEDURE — 74177 CT ABD & PELVIS W/CONTRAST: CPT | Mod: MF

## 2022-03-24 PROCEDURE — 700111 HCHG RX REV CODE 636 W/ 250 OVERRIDE (IP): Performed by: HOSPITALIST

## 2022-03-24 PROCEDURE — 80048 BASIC METABOLIC PNL TOTAL CA: CPT

## 2022-03-24 RX ORDER — DIPHENHYDRAMINE HCL 25 MG
25 TABLET ORAL EVERY 6 HOURS PRN
Status: DISCONTINUED | OUTPATIENT
Start: 2022-03-24 | End: 2022-04-07 | Stop reason: HOSPADM

## 2022-03-24 RX ORDER — HYDROCODONE BITARTRATE AND ACETAMINOPHEN 5; 325 MG/1; MG/1
1 TABLET ORAL EVERY 6 HOURS PRN
Status: DISCONTINUED | OUTPATIENT
Start: 2022-03-24 | End: 2022-04-04

## 2022-03-24 RX ORDER — MORPHINE SULFATE 4 MG/ML
2 INJECTION INTRAVENOUS EVERY 4 HOURS PRN
Status: DISCONTINUED | OUTPATIENT
Start: 2022-03-24 | End: 2022-04-07 | Stop reason: HOSPADM

## 2022-03-24 RX ADMIN — IOHEXOL 100 ML: 350 INJECTION, SOLUTION INTRAVENOUS at 18:00

## 2022-03-24 RX ADMIN — SENNOSIDES AND DOCUSATE SODIUM 2 TABLET: 50; 8.6 TABLET ORAL at 18:11

## 2022-03-24 RX ADMIN — MORPHINE SULFATE 2 MG: 4 INJECTION INTRAVENOUS at 11:40

## 2022-03-24 RX ADMIN — POTASSIUM CHLORIDE 20 MEQ: 1500 TABLET, EXTENDED RELEASE ORAL at 18:11

## 2022-03-24 RX ADMIN — POTASSIUM CHLORIDE 20 MEQ: 1500 TABLET, EXTENDED RELEASE ORAL at 05:24

## 2022-03-24 RX ADMIN — SENNOSIDES AND DOCUSATE SODIUM 2 TABLET: 50; 8.6 TABLET ORAL at 05:24

## 2022-03-24 RX ADMIN — HYDROCODONE BITARTRATE AND ACETAMINOPHEN 1 TABLET: 5; 325 TABLET ORAL at 20:18

## 2022-03-24 RX ADMIN — ENOXAPARIN SODIUM 40 MG: 40 INJECTION SUBCUTANEOUS at 18:10

## 2022-03-24 RX ADMIN — MORPHINE SULFATE 2 MG: 4 INJECTION INTRAVENOUS at 18:19

## 2022-03-24 ASSESSMENT — ENCOUNTER SYMPTOMS
FEVER: 0
COUGH: 0
NAUSEA: 0
SHORTNESS OF BREATH: 0
WEAKNESS: 0
PALPITATIONS: 0
DIARRHEA: 1
ABDOMINAL PAIN: 0
BACK PAIN: 1
BRUISES/BLEEDS EASILY: 0
CHILLS: 0
VOMITING: 0
MYALGIAS: 0
ROS GI COMMENTS: COLOSTOMY
BLURRED VISION: 0
HEADACHES: 0
DIZZINESS: 0
HEMOPTYSIS: 0

## 2022-03-24 ASSESSMENT — PAIN DESCRIPTION - PAIN TYPE
TYPE: ACUTE PAIN

## 2022-03-24 NOTE — CARE PLAN
The patient is Stable - Low risk of patient condition declining or worsening    Shift Goals  Clinical Goals: pain and drain management  Patient Goals: pain management, sleep    Progress made toward(s) clinical / shift goals:  Pain managed, pt instructed to be NPO four hours before scan    Problem: Knowledge Deficit - Standard  Goal: Patient and family/care givers will demonstrate understanding of plan of care, disease process/condition, diagnostic tests and medications  Outcome: Progressing     Problem: Pain - Standard  Goal: Alleviation of pain or a reduction in pain to the patient’s comfort goal  Outcome: Progressing       Patient is not progressing towards the following goals:

## 2022-03-24 NOTE — PROGRESS NOTES
Hospital Medicine Daily Progress Note    Date of Service  3/24/2022    Chief Complaint  Beau Mcintyre is a 51 y.o. male admitted 3/21/2022 with h/o colorectal cancer and multiple surgeries with end ileostomy and recurrent SBO's presents from outside hospital with recurrent SBO and possible posterior bladder intra abdominal fluid collection    Hospital Course  No notes on file    Interval Problem Update  Intra-abdominal fluid collection-drain remains in place, over 200 mL dark brown output. Cultures are growing E coli. WBC remains normal, afebrile. Reconsulted general surgery given concern for fistula. Patient denies any symptoms. NO change in urination. TOlerating current oral intake. SPoke with colorectal surgeon Dr Garcia, who recommended me talk to  for further recommendations again.       I have personally seen and examined the patient at bedside. I discussed the plan of care with patient.    Consultants/Specialty  general surgery    Code Status  Full Code    Disposition  Patient is not medically cleared for discharge.   Anticipate discharge to to home with close outpatient follow-up.  I have placed the appropriate orders for post-discharge needs.    Review of Systems  Review of Systems   Constitutional: Negative for chills and fever.   Cardiovascular: Negative for chest pain and palpitations.   Gastrointestinal: Positive for diarrhea (chronic). Negative for abdominal pain, nausea and vomiting.        Remains mostly the same today   Musculoskeletal: Negative for joint pain and myalgias.   All other systems reviewed and are negative.       Physical Exam  Temp:  [36.4 °C (97.5 °F)-37.1 °C (98.7 °F)] 36.6 °C (97.9 °F)  Pulse:  [62-78] 68  Resp:  [17-18] 18  BP: ()/(59-67) 99/66  SpO2:  [94 %-97 %] 97 %    Physical Exam  Vitals and nursing note reviewed.   Constitutional:       General: He is not in acute distress.     Appearance: He is well-developed. He is ill-appearing (chronically).   HENT:      Head:  Normocephalic and atraumatic.      Mouth/Throat:      Mouth: Mucous membranes are dry.      Pharynx: No oropharyngeal exudate.   Eyes:      General: No scleral icterus.     Pupils: Pupils are equal, round, and reactive to light.   Neck:      Thyroid: No thyromegaly.   Cardiovascular:      Rate and Rhythm: Normal rate and regular rhythm.      Heart sounds: Normal heart sounds. No murmur heard.  Pulmonary:      Effort: Pulmonary effort is normal. No respiratory distress.      Breath sounds: Normal breath sounds. No wheezing.   Abdominal:      General: Bowel sounds are normal. There is no distension.      Palpations: Abdomen is soft.      Tenderness: There is no abdominal tenderness.      Comments: Multiple surgical scars  Ileostomy present in the RLQ, dark/brown liquid output  NETTA drain in place with some possible enteric contents present, no changes noted today   Musculoskeletal:         General: No tenderness. Normal range of motion.      Cervical back: Normal range of motion and neck supple.   Skin:     General: Skin is warm and dry.      Findings: No rash.   Neurological:      Mental Status: He is alert and oriented to person, place, and time.      Cranial Nerves: No cranial nerve deficit.         Fluids    Intake/Output Summary (Last 24 hours) at 3/24/2022 1245  Last data filed at 3/24/2022 1140  Gross per 24 hour   Intake 860 ml   Output 1596 ml   Net -736 ml       Laboratory  Recent Labs     03/22/22  0452 03/23/22  0131 03/24/22  0059   WBC 11.7* 7.4 6.2   RBC 4.38* 4.10* 4.27*   HEMOGLOBIN 13.5* 12.7* 13.3*   HEMATOCRIT 40.1* 37.1* 38.3*   MCV 91.6 90.5 89.7   MCH 30.8 31.0 31.1   MCHC 33.7 34.2 34.7   RDW 45.4 42.2 42.6   PLATELETCT 313 275 327   MPV 8.7* 8.8* 8.9*     Recent Labs     03/22/22  0452 03/23/22  0131 03/24/22  0059   SODIUM 136 139 140   POTASSIUM 3.6 3.3* 3.5*   CHLORIDE 101 101 101   CO2 27 23 27   GLUCOSE 102* 86 97   BUN 11 7* 5*   CREATININE 0.87 0.63 0.84   CALCIUM 8.4* 8.7 9.0     Recent  Labs     03/21/22  1419   INR 1.05               Imaging  CT-DRAIN-PERITONEAL   Final Result      1.  CT GUIDED RETROPERITONEAL PELVIC/PRESACRAL CATHETER DRAINAGE. INITIAL FLUID RETURN RESEMBLED ENTERIC CONTENTS. A FISTULOUS LEAK MAY BE PRESENT. LIKEWISE, THE AIR CONTENT SUGGESTS THE POSSIBILITY OF COMMUNICATION WITH BOWEL.   2.  THE CURRENT PLAN IS TO MONITOR DRAINAGE OUTPUT, IRRIGATE DAILY AND OBTAIN A FOLLOWUP CT SCAN IN 5-7 DAYS IF CLINICALLY INDICATED.   3. ABSCESSOGRAM MAY BE OF INTEREST PRIOR TO CATHETER REMOVAL TO DETERMINE IF THERE IS FISTULOUS COMMUNICATION WITH SMALL BOWEL IN THE PELVIS.      OUTSIDE IMAGES-CT ABDOMEN /PELVIS   Final Result      CT-ABDOMEN&PELVIS ENTEROGRAPHY    (Results Pending)        Assessment/Plan  * Small bowel obstruction (HCC)- (present on admission)  Assessment & Plan  Resolved  Good ostomy output  Advancing diet slowly, now on FLD  GS has signed for now    Tobacco chew use- (present on admission)  Assessment & Plan  Recommend cessation    Abdominal fluid collection- (present on admission)  Assessment & Plan  Noted on outside hospital CT, clincally stable  Per outside CT abd: 3.7 by 3.9 cm fluid collection posterior to bladder which is an increase from prior imaging study on 3/10/2022  IR drain placed 3/22, possible fistulous communication with bowel, but likely chronic  Does not appear infected, tolerating FLD, good ostomy output and normal urination  Keep drain for now, fluid cultures are now growing E Coli (consulted ID, leukocytosis self resolved with no abx's, so deferring at this time, suspect if this is a fistula from stool passing through this area and will be persistently positive)  Reconsulted general surgery as need help with presumed fistula management  CT enterography  Current drain in place will not stop draining fluid if it is truly a fistula  ?Consider changing diet    Leukocytosis- (present on admission)  Assessment & Plan  Resolved, no clear e/o infection (does  have intra-abdominal fluid collection, does not appear infected at this time)  Defer abx's for now  Trend WBC/fever curve    Hypokalemia- (present on admission)  Assessment & Plan  Mild, replacement, daily labs       VTE prophylaxis: lovenox    I have performed a physical exam and reviewed and updated ROS and Plan today (3/24/2022). In review of yesterday's note (3/23/2022), there are no changes except as documented above.

## 2022-03-24 NOTE — PROGRESS NOTES
Bedside report received.  Assessment complete.  A&O x 4. Patient calls appropriately.   Reports 3/10 pain.   IR drain to left hip, drain flushed with 10 cc.  Denies N&V. Tolerating diet.  + void, established ileostomy to RLQ with green watery output.  Patient denies SOB.  Review plan with of care with patient. Call light and personal belongings with in reach. Hourly rounding in place. All needs met at this time.

## 2022-03-24 NOTE — DISCHARGE PLANNING
Anticipated Discharge Disposition:   Likely Home     Action:   Pt was discussed in ICT rounds today . Pt is from [facility] and  is S/P IR drain placement on 3/22.    Pt is now tolerating clears and plan is to advance diet as tolerated . Pt s ostomy is producing , wound managed by Wound Team.    Barriers to Discharge:   Pending medical clearance    Plan:   CM to continue to assist with discharge as needed

## 2022-03-24 NOTE — PROGRESS NOTES
"Radiology Progress Note   Author: NGHIA Perea Date & Time created: 3/24/2022  1:57 PM   Date of admission  3/21/2022  Note to reader: this note follows the APSO format rather than the historical SOAP format. Assessment and plan located at the top of the note for ease of use.    Chief Complaint  51 y.o. male admitted 3/21/2022 with abd pain     HPI  \" Beau Mcintyre is a 51 y.o. male admitted 3/21/2022 with h/o colorectal cancer and multiple surgeries with end ileostomy and recurrent SBO's presents from outside hospital with recurrent SBO and possible posterior bladder intra abdominal fluid collection\"      Assessment/Plan  Interval History   Principal Problem:    Small bowel obstruction (HCC)  Active Problems:    Hypokalemia    Leukocytosis    Abdominal fluid collection    Tobacco chew use      Plan IR  - Irrigate Left flank drain with 10 ml of sterile saline twice per shift   - Fluid cultures + Escherichia coli  - Surgery and ID following   - Antibiotic per ID   - Recommend follow up CT scan in 5-7 days 3/26   - Continue to monitor drains, VS and labs          IR:   3/22- Left posterior pelvic drain placed, 270 ml output    3/23-  65 ml in am of bloody brown drainage. Leukocytosis resolved 7.4, CO discomfort at drain site. Drain site CDI, no redness or swelling  3/24-  WBC 6.2, 80 ml brown fluid output.          Review of Systems  Physical Exam   Review of Systems   Constitutional: Positive for malaise/fatigue. Negative for chills and fever.   HENT: Negative for hearing loss.    Eyes: Negative for blurred vision.   Respiratory: Negative for cough, hemoptysis and shortness of breath.    Cardiovascular: Negative for chest pain and palpitations.   Gastrointestinal: Negative for abdominal pain and vomiting.        Colostomy    Genitourinary: Negative for dysuria.   Musculoskeletal: Positive for back pain. Negative for myalgias.        Discomfort at drain site    Skin: Negative for rash.   Neurological: " Negative for dizziness, weakness and headaches.   Endo/Heme/Allergies: Does not bruise/bleed easily.   Psychiatric/Behavioral: Negative for suicidal ideas.      Vitals:    03/24/22 0736   BP: (!) 99/66   Pulse: 68   Resp: 18   Temp: 36.6 °C (97.9 °F)   SpO2: 97%        Physical Exam  Constitutional:       Appearance: Normal appearance.   HENT:      Head: Normocephalic.      Nose: Nose normal.      Mouth/Throat:      Mouth: Mucous membranes are moist.   Eyes:      Pupils: Pupils are equal, round, and reactive to light.   Cardiovascular:      Rate and Rhythm: Normal rate.   Pulmonary:      Effort: Pulmonary effort is normal. No respiratory distress.   Abdominal:      Palpations: Abdomen is soft.      Tenderness: There is no abdominal tenderness.      Comments: Colostomy    Musculoskeletal:         General: No tenderness or deformity.      Cervical back: Normal range of motion.        Back:    Skin:     General: Skin is warm and dry.      Capillary Refill: Capillary refill takes less than 2 seconds.      Coloration: Skin is not jaundiced or pale.   Neurological:      General: No focal deficit present.      Mental Status: He is alert.      Motor: No weakness.   Psychiatric:         Mood and Affect: Mood normal.         Behavior: Behavior normal.             Labs    Recent Labs     03/22/22  0452 03/23/22  0131 03/24/22  0059   WBC 11.7* 7.4 6.2   RBC 4.38* 4.10* 4.27*   HEMOGLOBIN 13.5* 12.7* 13.3*   HEMATOCRIT 40.1* 37.1* 38.3*   MCV 91.6 90.5 89.7   MCH 30.8 31.0 31.1   MCHC 33.7 34.2 34.7   RDW 45.4 42.2 42.6   PLATELETCT 313 275 327   MPV 8.7* 8.8* 8.9*     Recent Labs     03/22/22  0452 03/23/22  0131 03/24/22  0059   SODIUM 136 139 140   POTASSIUM 3.6 3.3* 3.5*   CHLORIDE 101 101 101   CO2 27 23 27   GLUCOSE 102* 86 97   BUN 11 7* 5*   CREATININE 0.87 0.63 0.84   CALCIUM 8.4* 8.7 9.0     Recent Labs     03/21/22  1419 03/22/22  0452 03/23/22  0131 03/24/22  0059   ALBUMIN 3.5  --   --   --    TBILIRUBIN 0.9  --    --   --    ALKPHOSPHAT 102*  --   --   --    TOTPROTEIN 7.0  --   --   --    ALTSGPT 14  --   --   --    ASTSGOT 16  --   --   --    CREATININE 0.98 0.87 0.63 0.84     CT-DRAIN-PERITONEAL   Final Result      1.  CT GUIDED RETROPERITONEAL PELVIC/PRESACRAL CATHETER DRAINAGE. INITIAL FLUID RETURN RESEMBLED ENTERIC CONTENTS. A FISTULOUS LEAK MAY BE PRESENT. LIKEWISE, THE AIR CONTENT SUGGESTS THE POSSIBILITY OF COMMUNICATION WITH BOWEL.   2.  THE CURRENT PLAN IS TO MONITOR DRAINAGE OUTPUT, IRRIGATE DAILY AND OBTAIN A FOLLOWUP CT SCAN IN 5-7 DAYS IF CLINICALLY INDICATED.   3. ABSCESSOGRAM MAY BE OF INTEREST PRIOR TO CATHETER REMOVAL TO DETERMINE IF THERE IS FISTULOUS COMMUNICATION WITH SMALL BOWEL IN THE PELVIS.      OUTSIDE IMAGES-CT ABDOMEN /PELVIS   Final Result      CT-ABDOMEN&PELVIS ENTEROGRAPHY    (Results Pending)       INR   Date Value Ref Range Status   03/21/2022 1.05 0.87 - 1.13 Final     Comment:     INR - Non-therapeutic Reference Range: 0.87-1.13  INR - Therapeutic Reference Range: 2.0-4.0       No results found for: POCINR     Intake/Output Summary (Last 24 hours) at 3/23/2022 1341  Last data filed at 3/23/2022 1147  Gross per 24 hour   Intake 633.75 ml   Output 2610 ml   Net -1976.25 ml      Labs not explicitly included in this progress note were reviewed by the author. Radiology/imaging not explicitly included in this progress note was reviewed by the author.     I have performed a physical exam and reviewed and updated ROS and Plan today (3/24/2022).     15 minutes in directly providing and coordinating care and extensive data review.  No time overlap and excludes procedures.

## 2022-03-24 NOTE — CONSULTS
INFECTIOUS DISEASES INPATIENT CONSULT NOTE     Date of Service: 3/24/2022    Consult Requested By: Suresh Garcia M.D.    Reason for Consultation: abd abscess  Concern for fistula    History of Present Illness:   Beau Mcintyre is a 51 y.o. male with h/o recurrent colon cancer admitted 3/21/2022 as transfer from [place] for N/V due to SBO.  States recurrent SBO since prior surgeries for colon cancer  s/p resection/radiation/chemo in 2003.  He had additional surgery in 2013 and then again 2015 with a total colectomy for cancer recurrence.  He has had episodes of small bowel obstruction in the past.  He has an ileostomy. Last Sunday he began having abdominal pain with N/V.  No output from his ileostomy.  He went to [facility] in [place] and CT was concerning for SBO and posterior to the bladder a 3.7 by 3.9cm fluid collection which was increased from prior imaging. +leukocytosis of 18  He was given a dose of ceftriaxone prior to transfer   Seen by surgery-procedure deferred. S/p IR drain 3/22. CT here showed 3/7 cm presacral abscess  Peritoneal culture +Ecoli  Currently on [facility]  Infectious Diseases consulted for antibiotic recommendation and management      Review Of Systems:  Mild abd pain  Rash on arm after getting oxycodone  All other systems are reviewed and are negative     PMH:   Past Medical History:   Diagnosis Date   • Cancer (HCC)    • Small bowel obstruction (HCC)     Recurrent         PSH:  Past Surgical History:   Procedure Laterality Date   • HYDROCELECTOMY ADULT     • OTHER ABDOMINAL SURGERY      Complete colectomy       FAMILY HX:  Family History   Problem Relation Age of Onset   • Colon Cancer Mother    • Cancer Mother         Hepatocellular cancer       SOCIAL HX:  Social History     Socioeconomic History   • Marital status: Single     Spouse name: Not on file   • Number of children: Not on file   • Years of education: Not on file   • Highest education level: Not on file    Occupational History   • Not on file   Tobacco Use   • Smoking status: Former Smoker     Packs/day: 1.00     Types: Cigarettes     Quit date: 1994     Years since quittin.7   • Smokeless tobacco: Never Used   Substance and Sexual Activity   • Alcohol use: Not on file   • Drug use: Not on file   • Sexual activity: Not on file   Other Topics Concern   • Not on file   Social History Narrative   • Not on file     Social Determinants of Health     Financial Resource Strain: Not on file   Food Insecurity: Not on file   Transportation Needs: Not on file   Physical Activity: Not on file   Stress: Not on file   Social Connections: Not on file   Intimate Partner Violence: Not on file   Housing Stability: Not on file     Social History     Tobacco Use   Smoking Status Former Smoker   • Packs/day: 1.00   • Types: Cigarettes   • Quit date: 1994   • Years since quittin.7   Smokeless Tobacco Never Used     Social History     Substance and Sexual Activity   Alcohol Use Not on file       Allergies/Intolerances:  Allergies   Allergen Reactions   • Hydromorphone Hives   • Cantaloupe Itching     Throat itches   • Tape Rash     Rash and blistering with prolonged exposure. Some tape causes immediate rash, burning. Paper tape OK per patient.         Other Current Medications:    Current Facility-Administered Medications:   •  potassium chloride SA (Kdur) tablet 20 mEq, 20 mEq, Oral, BID, Suresh Garcia M.D., 20 mEq at 22 0524  •  senna-docusate (PERICOLACE or SENOKOT S) 8.6-50 MG per tablet 2 Tablet, 2 Tablet, Oral, BID, 2 Tablet at 22 0524 **AND** polyethylene glycol/lytes (MIRALAX) PACKET 1 Packet, 1 Packet, Oral, QDAY PRN **AND** magnesium hydroxide (MILK OF MAGNESIA) suspension 30 mL, 30 mL, Oral, QDAY PRN **AND** bisacodyl (DULCOLAX) suppository 10 mg, 10 mg, Rectal, QDAY PRN, Willi Adam D.O.  •  enoxaparin (LOVENOX) inj 40 mg, 40 mg, Subcutaneous, DAILY, Willi Adam D.O., 40 mg at 22  "1626  •  acetaminophen (Tylenol) tablet 650 mg, 650 mg, Oral, Q6HRS PRN, Willi Adam D.O.  •  Notify provider if pain remains uncontrolled, , , CONTINUOUS **AND** Use the Numeric Rating Scale (NRS), Ervin-Baker Faces (WBF), or FLACC on regular floors and Critical-Care Pain Observation Tool (CPOT) on ICUs/Trauma to assess pain, , , CONTINUOUS **AND** Pulse Ox, , , CONTINUOUS **AND** Pharmacy Consult Request ...Pain Management Review 1 Each, 1 Each, Other, PHARMACY TO DOSE **AND** If patient difficult to arouse and/or has respiratory depression (respiratory rate of 10 or less), stop any opiates that are currently infusing and call a Rapid Response., , , CONTINUOUS, Willi Adam D.O.  •  oxyCODONE immediate-release (ROXICODONE) tablet 2.5 mg, 2.5 mg, Oral, Q3HRS PRN, 2.5 mg at 22 1346 **OR** oxyCODONE immediate-release (ROXICODONE) tablet 5 mg, 5 mg, Oral, Q3HRS PRN, 5 mg at 22 2109 **OR** morphine 4 MG/ML injection 2 mg, 2 mg, Intravenous, Q3HRS PRN, LISSETTE MorganO., 2 mg at 22 1140  •  labetalol (NORMODYNE/TRANDATE) injection 10 mg, 10 mg, Intravenous, Q4HRS PRN, LISSETTE MorganO.  •  ondansetron (ZOFRAN) syringe/vial injection 4 mg, 4 mg, Intravenous, Q4HRS PRN, LISSETTE MorganO.  •  ondansetron (ZOFRAN ODT) dispertab 4 mg, 4 mg, Oral, Q4HRS PRN, LISSETTE MorganO.  •  promethazine (PHENERGAN) tablet 12.5-25 mg, 12.5-25 mg, Oral, Q4HRS PRN, LISSETTE MorganO.  •  promethazine (PHENERGAN) suppository 12.5-25 mg, 12.5-25 mg, Rectal, Q4HRS PRN, LISSETTE MorganO.  •  prochlorperazine (COMPAZINE) injection 5-10 mg, 5-10 mg, Intravenous, Q4HRS PRN, LISSETTE MorganO.      Most Recent Vital Signs:  BP (!) 99/66   Pulse 68   Temp 36.6 °C (97.9 °F) (Temporal)   Resp 18   Ht 1.88 m (6' 2.02\")   Wt 88.8 kg (195 lb 12.3 oz)   SpO2 97%   BMI 25.12 kg/m²   Temp  Av.6 °C (97.8 °F)  Min: 36 °C (96.8 °F)  Max: 37.5 °C (99.5 °F)    Physical Exam:  General: well-appearing, well " nourished no acute distress  HEENT: NCAT, PERRLA, sclera anicteric, PERRL, EOMI,  no oral lesions Dentition fair  Neck: supple, no lymphadenopathy  Chest: CTAB, unlabored.  Cardiac: RRR,  no m/r/g   Abdomen: + bowel sounds, soft, mild tender, non-distended ostomy +output  Extremities: No cyanosis, clubbing. no edema, 2+ pulses  Skin: + rashes forearm  Neuro: Alert and oriented times 3, Speech fluent CN intact GAMEZ  Psych: Mood normal Affect normal    Pertinent Lab Results:  Recent Labs     03/22/22  0452 03/23/22  0131 03/24/22  0059   WBC 11.7* 7.4 6.2      Recent Labs     03/22/22 0452 03/23/22 0131 03/24/22  0059   HEMOGLOBIN 13.5* 12.7* 13.3*   HEMATOCRIT 40.1* 37.1* 38.3*   MCV 91.6 90.5 89.7   MCH 30.8 31.0 31.1   PLATELETCT 313 275 327         Recent Labs     03/22/22  0452 03/23/22  0131 03/24/22  0059   SODIUM 136 139 140   POTASSIUM 3.6 3.3* 3.5*   CHLORIDE 101 101 101   CO2 27 23 27   CREATININE 0.87 0.63 0.84        Recent Labs     03/21/22  1419   ALBUMIN 3.5        Pertinent Micro:  Results     Procedure Component Value Units Date/Time    FLUID CULTURE W/GRAM STAIN [[id number]]  (Abnormal)  (Susceptibility) Collected: 03/22/22 1606    Order Status: Completed Specimen: Body Fluid Updated: 03/24/22 1252     Significant Indicator POS     Source BF     Site Peritoneal Fluid     Culture Result Light growth mixed skin andrei.     Gram Stain Result Moderate WBCs.  Few Gram negative rods.       Culture Result Escherichia coli  Light growth      Narrative:      order req#[id number]    Susceptibility     Escherichia coli (1)     Antibiotic Interpretation Microscan   Method Status    Ampicillin Sensitive <=8 mcg/mL PATTIE Final    Ceftriaxone Sensitive <=1 mcg/mL PATTIE Final    Cefazolin Sensitive <=2 mcg/mL PATTIE Final    Ciprofloxacin Sensitive <=0.25 mcg/mL PATTIE Final    Cefepime Sensitive <=2 mcg/mL PATTIE Final    Cefuroxime Sensitive <=4 mcg/mL PATTIE Final    Ampicillin/sulbactam Sensitive <=4/2 mcg/mL PATTIE Final     Ertapenem Sensitive <=0.5 mcg/mL PATTIE Final    Tobramycin Sensitive <=2 mcg/mL PATTIE Final    Gentamicin Sensitive <=2 mcg/mL PATTIE Final    Minocycline Sensitive <=4 mcg/mL PATTIE Final    Moxifloxacin Sensitive <=2 mcg/mL PATTIE Final    Pip/Tazobactam Sensitive <=8 mcg/mL PATTIE Final    Trimeth/Sulfa Sensitive <=0.5/9.5 mcg/mL PATTIE Final    Tigecycline Sensitive <=2 mcg/mL PATTIE Final                   GRAM STAIN [[id number]] Collected: 03/22/22 1606    Order Status: Completed Specimen: Body Fluid Updated: 03/23/22 0611     Significant Indicator .     Source BF     Site Peritoneal Fluid     Gram Stain Result Moderate WBCs.  Few Gram negative rods.      Narrative:      order req#[id number]    FLUID CULTURE W/GRAM STAIN [[id number]]     Order Status: No result Specimen: Other Body Fluid         No results found for: BLOODCULTU, BLDCULT, BCHOLD     Studies:   3/22  FINDINGS:  Initial CT images again show an approximately 3.7 cm presacral collection with an air-fluid level. The final CT images show satisfactory catheter position within the targeted presacral collection.     IMPRESSION:     1.  CT GUIDED RETROPERITONEAL PELVIC/PRESACRAL CATHETER DRAINAGE. INITIAL FLUID RETURN RESEMBLED ENTERIC CONTENTS. A FISTULOUS LEAK MAY BE PRESENT. LIKEWISE, THE AIR CONTENT SUGGESTS THE POSSIBILITY OF COMMUNICATION WITH BOWEL.  2.  THE CURRENT PLAN IS TO MONITOR DRAINAGE OUTPUT, IRRIGATE DAILY AND OBTAIN A FOLLOWUP CT SCAN IN 5-7 DAYS IF CLINICALLY INDICATED.  3. ABSCESSOGRAM MAY BE OF INTEREST PRIOR TO CATHETER REMOVAL TO DETERMINE IF THERE IS FISTULOUS COMMUNICATION WITH SMALL BOWEL IN THE PELVIS    IMPRESSION:   Recurrent SBO  H/o recurrent colon cancer  Probable fistula vs perforation  Intraabd abscess  Leukocytosis resolved      PLAN:   Continue Zosyn for now pending final cultures  Repeat CT planned  If fistula present will require weeks of antibiotics + drain to hopefully allow for healing  Will likely need PICC  Final antibiotic  recommendations per culture results and clinical course        Plan of care discussed with IM Suresh Garcia M.D.. Will continue to follow    Hilaria Ho M.D.

## 2022-03-24 NOTE — CARE PLAN
The patient is Stable - Low risk of patient condition declining or worsening    Shift Goals  Clinical Goals: pain and drain management  Patient Goals: pain management, sleep    Progress made toward(s) clinical / shift goals:  Pt's 7/10 pain managed with PRN pain medications and rest. Pain medications available discussed with pt. Bed locked and in lowest position, call light and belongings within reach, pt resting comfortably in bed and able to sleep throughout the night, all needs met at this time.   Problem: Knowledge Deficit - Standard  Goal: Patient and family/care givers will demonstrate understanding of plan of care, disease process/condition, diagnostic tests and medications  Outcome: Progressing     Problem: Pain - Standard  Goal: Alleviation of pain or a reduction in pain to the patient’s comfort goal  Outcome: Progressing     Problem: Nutrition  Goal: Patient's nutritional and fluid intake will be adequate or improve  Outcome: Progressing     Problem: Urinary Elimination  Goal: Establish and maintain regular urinary output  Outcome: Progressing     Problem: Mobility  Goal: Patient's capacity to carry out activities will improve  Outcome: Progressing     Problem: Self Care  Goal: Patient will have the ability to perform ADLs independently or with assistance (bathe, groom, dress, toilet and feed)  Outcome: Progressing       Patient is not progressing towards the following goals:

## 2022-03-24 NOTE — PROGRESS NOTES
Received report from previous shift RN.  Assessment complete.  Patient A&O x 4. Patient calls appropriately.  Patient ambulates with no assist.   Patient has 7/10 pain. Pain managed with prescribed medications.  Denies N&V. Tolerating full liquid diet.  Surgical L flank IR drain to bulb suction, scant brown drainage. Flush drain with 10 cc per order.   + void, + flatus, + BM via ileostomy.  Patient on RA, denies SOB.     Reviewed plan of care with patient. Call light and personal belongings within reach. Hourly rounding in place. All needs met at this time.

## 2022-03-24 NOTE — PROGRESS NOTES
"    DATE: 3/24/2022    Hospital Day 3 small bowel obstruction.    INTERVAL EVENTS:  Surgery returned to case to exam possible fistula  Patient reports overall pain control  NETTA drain in place with bilious brown liquid noted, .115mls per nursing documentation  (+) flatus  (+) BM 3/23  PHYSICAL EXAMINATION:  Vital Signs: BP (!) 99/66   Pulse 68   Temp 36.6 °C (97.9 °F) (Temporal)   Resp 18   Ht 1.88 m (6' 2.02\")   Wt 88.8 kg (195 lb 12.3 oz)   SpO2 97%     Physical Exam  Vitals and nursing note reviewed.   Constitutional:       General: He is not in acute distress.     Appearance: He is well-developed. He is not ill-appearing.      Comments: Up walking in room   HENT:      Head: Normocephalic and atraumatic.      Nose: Nose normal.      Mouth/Throat:      Mouth: Mucous membranes are moist.      Pharynx: No oropharyngeal exudate.   Eyes:      General: No scleral icterus.     Conjunctiva/sclera: Conjunctivae normal.   Neck:      Thyroid: No thyromegaly.   Cardiovascular:      Rate and Rhythm: Normal rate and regular rhythm.      Heart sounds: Normal heart sounds. No murmur heard.  Pulmonary:      Effort: Pulmonary effort is normal. No respiratory distress.      Breath sounds: Normal breath sounds. No wheezing.   Abdominal:      General: Bowel sounds are normal. There is no distension.      Palpations: Abdomen is soft.      Tenderness: There is no abdominal tenderness. There is no guarding or rebound.      Comments: Multiple surgical scars  Ileostomy present in the RLQ, dark/brown liquid output  NETTA drain in place.   Musculoskeletal:         General: No tenderness. Normal range of motion.      Cervical back: Normal range of motion and neck supple.   Skin:     General: Skin is warm and dry.      Findings: No rash.   Neurological:      Mental Status: He is alert and oriented to person, place, and time.      Cranial Nerves: No cranial nerve deficit.           LABORATORY VALUES:   Recent Labs     03/22/22  0452 " 03/23/22  0131 03/24/22  0059   WBC 11.7* 7.4 6.2   RBC 4.38* 4.10* 4.27*   HEMOGLOBIN 13.5* 12.7* 13.3*   HEMATOCRIT 40.1* 37.1* 38.3*   MCV 91.6 90.5 89.7   MCH 30.8 31.0 31.1   MCHC 33.7 34.2 34.7   RDW 45.4 42.2 42.6   PLATELETCT 313 275 327   MPV 8.7* 8.8* 8.9*     Recent Labs     03/22/22  0452 03/23/22  0131 03/24/22  0059   SODIUM 136 139 140   POTASSIUM 3.6 3.3* 3.5*   CHLORIDE 101 101 101   CO2 27 23 27   GLUCOSE 102* 86 97   BUN 11 7* 5*   CREATININE 0.87 0.63 0.84   CALCIUM 8.4* 8.7 9.0     Recent Labs     03/21/22  1419   ASTSGOT 16   ALTSGPT 14   TBILIRUBIN 0.9   ALKPHOSPHAT 102*   GLOBULIN 3.5   INR 1.05     Recent Labs     03/21/22  1419   INR 1.05        IMAGING:   CT-DRAIN-PERITONEAL   Final Result      1.  CT GUIDED RETROPERITONEAL PELVIC/PRESACRAL CATHETER DRAINAGE. INITIAL FLUID RETURN RESEMBLED ENTERIC CONTENTS. A FISTULOUS LEAK MAY BE PRESENT. LIKEWISE, THE AIR CONTENT SUGGESTS THE POSSIBILITY OF COMMUNICATION WITH BOWEL.   2.  THE CURRENT PLAN IS TO MONITOR DRAINAGE OUTPUT, IRRIGATE DAILY AND OBTAIN A FOLLOWUP CT SCAN IN 5-7 DAYS IF CLINICALLY INDICATED.   3. ABSCESSOGRAM MAY BE OF INTEREST PRIOR TO CATHETER REMOVAL TO DETERMINE IF THERE IS FISTULOUS COMMUNICATION WITH SMALL BOWEL IN THE PELVIS.      OUTSIDE IMAGES-CT ABDOMEN /PELVIS   Final Result      CT-ABDOMEN&PELVIS ENTEROGRAPHY    (Results Pending)       ASSESSMENT AND PLAN:   * Small bowel obstruction (HCC)- (present on admission)  Assessment & Plan  Medical management   NG for any N/V  Ice chips   3/23 Advance diet as tolerated   3/24 CT pending         ____________________________________     SO Kincaid.    DD: 3/24/2022  10:23 AM     pt intubated

## 2022-03-24 NOTE — DISCHARGE PLANNING
Anticipated Discharge Disposition: Home.    Action: VICKYW met with patient to discuss his concerns regarding not attending his interview at the [facility] which might impact his Unemployment Benefits.    VICKYW explained to the patient, per DERRICK Schafer at [employer] [phone], patient's unemployment benefits are on hold while patient is in the hospital; however, once patient is discharge, patient will call [employer] to restart his benefits.     Barriers to Discharge: None.    Plan: Home, pending medical clearance.

## 2022-03-25 ENCOUNTER — APPOINTMENT (OUTPATIENT)
Dept: RADIOLOGY | Facility: MEDICAL CENTER | Age: 52
DRG: 372 | End: 2022-03-25
Attending: SURGERY
Payer: COMMERCIAL

## 2022-03-25 ENCOUNTER — APPOINTMENT (OUTPATIENT)
Dept: RADIOLOGY | Facility: MEDICAL CENTER | Age: 52
DRG: 372 | End: 2022-03-25
Attending: NURSE PRACTITIONER
Payer: COMMERCIAL

## 2022-03-25 PROBLEM — K62.89 RECTAL MASS: Status: ACTIVE | Noted: 2022-03-25

## 2022-03-25 LAB
ANION GAP SERPL CALC-SCNC: 11 MMOL/L (ref 7–16)
BASOPHILS # BLD AUTO: 0.5 % (ref 0–1.8)
BASOPHILS # BLD: 0.03 K/UL (ref 0–0.12)
BUN SERPL-MCNC: 8 MG/DL (ref 8–22)
CALCIUM SERPL-MCNC: 8.8 MG/DL (ref 8.5–10.5)
CEA SERPL-MCNC: 1 NG/ML (ref 0–3)
CHLORIDE SERPL-SCNC: 100 MMOL/L (ref 96–112)
CO2 SERPL-SCNC: 24 MMOL/L (ref 20–33)
CREAT SERPL-MCNC: 0.78 MG/DL (ref 0.5–1.4)
EOSINOPHIL # BLD AUTO: 0.09 K/UL (ref 0–0.51)
EOSINOPHIL NFR BLD: 1.5 % (ref 0–6.9)
ERYTHROCYTE [DISTWIDTH] IN BLOOD BY AUTOMATED COUNT: 43 FL (ref 35.9–50)
GFR SERPLBLD CREATININE-BSD FMLA CKD-EPI: 108 ML/MIN/1.73 M 2
GLUCOSE SERPL-MCNC: 99 MG/DL (ref 65–99)
HCT VFR BLD AUTO: 40.9 % (ref 42–52)
HGB BLD-MCNC: 14 G/DL (ref 14–18)
IMM GRANULOCYTES # BLD AUTO: 0.02 K/UL (ref 0–0.11)
IMM GRANULOCYTES NFR BLD AUTO: 0.3 % (ref 0–0.9)
LYMPHOCYTES # BLD AUTO: 1.99 K/UL (ref 1–4.8)
LYMPHOCYTES NFR BLD: 34.1 % (ref 22–41)
MCH RBC QN AUTO: 30.8 PG (ref 27–33)
MCHC RBC AUTO-ENTMCNC: 34.2 G/DL (ref 33.7–35.3)
MCV RBC AUTO: 90.1 FL (ref 81.4–97.8)
MONOCYTES # BLD AUTO: 0.39 K/UL (ref 0–0.85)
MONOCYTES NFR BLD AUTO: 6.7 % (ref 0–13.4)
NEUTROPHILS # BLD AUTO: 3.31 K/UL (ref 1.82–7.42)
NEUTROPHILS NFR BLD: 56.9 % (ref 44–72)
NRBC # BLD AUTO: 0 K/UL
NRBC BLD-RTO: 0 /100 WBC
PLATELET # BLD AUTO: 359 K/UL (ref 164–446)
PMV BLD AUTO: 9 FL (ref 9–12.9)
POTASSIUM SERPL-SCNC: 3.2 MMOL/L (ref 3.6–5.5)
RBC # BLD AUTO: 4.54 M/UL (ref 4.7–6.1)
SODIUM SERPL-SCNC: 135 MMOL/L (ref 135–145)
WBC # BLD AUTO: 5.8 K/UL (ref 4.8–10.8)

## 2022-03-25 PROCEDURE — 700105 HCHG RX REV CODE 258: Performed by: INTERNAL MEDICINE

## 2022-03-25 PROCEDURE — 99233 SBSQ HOSP IP/OBS HIGH 50: CPT | Performed by: INTERNAL MEDICINE

## 2022-03-25 PROCEDURE — 99232 SBSQ HOSP IP/OBS MODERATE 35: CPT | Performed by: SURGERY

## 2022-03-25 PROCEDURE — A9270 NON-COVERED ITEM OR SERVICE: HCPCS | Performed by: INTERNAL MEDICINE

## 2022-03-25 PROCEDURE — 700102 HCHG RX REV CODE 250 W/ 637 OVERRIDE(OP): Performed by: HOSPITALIST

## 2022-03-25 PROCEDURE — A9270 NON-COVERED ITEM OR SERVICE: HCPCS | Performed by: HOSPITALIST

## 2022-03-25 PROCEDURE — 770001 HCHG ROOM/CARE - MED/SURG/GYN PRIV*

## 2022-03-25 PROCEDURE — 82378 CARCINOEMBRYONIC ANTIGEN: CPT

## 2022-03-25 PROCEDURE — 87040 BLOOD CULTURE FOR BACTERIA: CPT

## 2022-03-25 PROCEDURE — 700111 HCHG RX REV CODE 636 W/ 250 OVERRIDE (IP): Performed by: HOSPITALIST

## 2022-03-25 PROCEDURE — 700117 HCHG RX CONTRAST REV CODE 255: Performed by: NURSE PRACTITIONER

## 2022-03-25 PROCEDURE — 85025 COMPLETE CBC W/AUTO DIFF WBC: CPT

## 2022-03-25 PROCEDURE — 84153 ASSAY OF PSA TOTAL: CPT

## 2022-03-25 PROCEDURE — 36415 COLL VENOUS BLD VENIPUNCTURE: CPT

## 2022-03-25 PROCEDURE — 700102 HCHG RX REV CODE 250 W/ 637 OVERRIDE(OP): Performed by: INTERNAL MEDICINE

## 2022-03-25 PROCEDURE — 74177 CT ABD & PELVIS W/CONTRAST: CPT

## 2022-03-25 PROCEDURE — 700111 HCHG RX REV CODE 636 W/ 250 OVERRIDE (IP): Performed by: INTERNAL MEDICINE

## 2022-03-25 PROCEDURE — 74177 CT ABD & PELVIS W/CONTRAST: CPT | Mod: MF

## 2022-03-25 PROCEDURE — 80048 BASIC METABOLIC PNL TOTAL CA: CPT

## 2022-03-25 RX ORDER — POTASSIUM CHLORIDE 20 MEQ/1
20 TABLET, EXTENDED RELEASE ORAL 2 TIMES DAILY
Status: COMPLETED | OUTPATIENT
Start: 2022-03-25 | End: 2022-03-26

## 2022-03-25 RX ADMIN — MORPHINE SULFATE 2 MG: 4 INJECTION INTRAVENOUS at 13:49

## 2022-03-25 RX ADMIN — PIPERACILLIN AND TAZOBACTAM 3.38 G: 3; .375 INJECTION, POWDER, LYOPHILIZED, FOR SOLUTION INTRAVENOUS; PARENTERAL at 20:07

## 2022-03-25 RX ADMIN — IOHEXOL 100 ML: 350 INJECTION, SOLUTION INTRAVENOUS at 23:11

## 2022-03-25 RX ADMIN — HYDROCODONE BITARTRATE AND ACETAMINOPHEN 1 TABLET: 5; 325 TABLET ORAL at 20:07

## 2022-03-25 RX ADMIN — HYDROCODONE BITARTRATE AND ACETAMINOPHEN 1 TABLET: 5; 325 TABLET ORAL at 10:17

## 2022-03-25 RX ADMIN — POTASSIUM CHLORIDE 20 MEQ: 1500 TABLET, EXTENDED RELEASE ORAL at 08:55

## 2022-03-25 RX ADMIN — PIPERACILLIN AND TAZOBACTAM 3.38 G: 3; .375 INJECTION, POWDER, LYOPHILIZED, FOR SOLUTION INTRAVENOUS; PARENTERAL at 10:09

## 2022-03-25 RX ADMIN — IOHEXOL 25 ML: 240 INJECTION, SOLUTION INTRATHECAL; INTRAVASCULAR; INTRAVENOUS; ORAL at 23:11

## 2022-03-25 RX ADMIN — ENOXAPARIN SODIUM 40 MG: 40 INJECTION SUBCUTANEOUS at 17:18

## 2022-03-25 RX ADMIN — SENNOSIDES AND DOCUSATE SODIUM 2 TABLET: 50; 8.6 TABLET ORAL at 17:15

## 2022-03-25 RX ADMIN — PIPERACILLIN AND TAZOBACTAM 3.38 G: 3; .375 INJECTION, POWDER, LYOPHILIZED, FOR SOLUTION INTRAVENOUS; PARENTERAL at 13:45

## 2022-03-25 RX ADMIN — DIPHENHYDRAMINE HYDROCHLORIDE 25 MG: 25 TABLET ORAL at 01:13

## 2022-03-25 RX ADMIN — HYDROCODONE BITARTRATE AND ACETAMINOPHEN 1 TABLET: 5; 325 TABLET ORAL at 01:12

## 2022-03-25 RX ADMIN — SENNOSIDES AND DOCUSATE SODIUM 2 TABLET: 50; 8.6 TABLET ORAL at 08:55

## 2022-03-25 RX ADMIN — POTASSIUM CHLORIDE 20 MEQ: 1500 TABLET, EXTENDED RELEASE ORAL at 17:15

## 2022-03-25 ASSESSMENT — ENCOUNTER SYMPTOMS
ABDOMINAL PAIN: 0
DIARRHEA: 1
COUGH: 0
SHORTNESS OF BREATH: 0
ROS GI COMMENTS: COLOSTOMY
WEAKNESS: 0
HEMOPTYSIS: 0
MYALGIAS: 0
NAUSEA: 0
DIZZINESS: 0
CHILLS: 0
FEVER: 0
BACK PAIN: 1
ROS GI COMMENTS: MILD ABD PAIN
HEADACHES: 0
BLURRED VISION: 0
VOMITING: 0
BRUISES/BLEEDS EASILY: 0
PALPITATIONS: 0

## 2022-03-25 ASSESSMENT — PAIN DESCRIPTION - PAIN TYPE
TYPE: ACUTE PAIN
TYPE: ACUTE PAIN

## 2022-03-25 NOTE — PROGRESS NOTES
Received report from previous shift RN.  Assessment complete.  Patient A&O x 4. Patient calls appropriately.  Patient ambulates with no assist  Patient has 8/10  pain. Pain managed with prescribed medications, rest, repositioning, and heat pad.  Denies N&V. Tolerating regular  diet.    + void, + flatus, last BM 3/24 .  Patient on RA,  denies SOB.     Reviewed plan of care with patient. Call light and personal belongings within reach. Hourly rounding in place. All needs met at this time.

## 2022-03-25 NOTE — PROGRESS NOTES
"Radiology Progress Note   Author: NGHIA Perea Date & Time created: 3/25/2022  8:17 AM   Date of admission  3/21/2022  Note to reader: this note follows the APSO format rather than the historical SOAP format. Assessment and plan located at the top of the note for ease of use.    Chief Complaint  51 y.o. male admitted 3/21/2022 with abd pain     HPI  \" Beau Mcintyre is a 51 y.o. male admitted 3/21/2022 with h/o colorectal cancer and multiple surgeries with end ileostomy and recurrent SBO's presents from outside hospital with recurrent SBO and possible posterior bladder intra abdominal fluid collection\"      Assessment/Plan  Interval History   Principal Problem:    Small bowel obstruction (HCC)  Active Problems:    Hypokalemia    Leukocytosis    Abdominal fluid collection    Tobacco chew use      Plan IR  - Irrigate Left flank drain with 10 ml of sterile saline twice per shift   - Fluid cultures + Escherichia coli  - Surgery and ID following   - Antibiotic per ID   - Continue to monitor drains, VS and labs   - Surgery Consulted,  fistula non operatively with NETTA     IR:   3/22- Left posterior pelvic drain placed, 270 ml output    3/23-  65 ml in am of bloody brown drainage. Leukocytosis resolved 7.4, CO discomfort at drain site. Drain site CDI, no redness or swelling  3/24-  WBC 6.2, 100 ml brown fluid output.   3/25- CT scan from today show abscess unchanged presacral/precoccygeal space fluid collection measuring 2.9 x 2.2 cm. No direct evidence for fistula although cannot be excluded with the negative contrast administered for enterography.           Review of Systems  Physical Exam   Review of Systems   Constitutional: Positive for malaise/fatigue. Negative for chills and fever.   HENT: Negative for hearing loss.    Eyes: Negative for blurred vision.   Respiratory: Negative for cough, hemoptysis and shortness of breath.    Cardiovascular: Negative for chest pain and palpitations.   Gastrointestinal: " Negative for abdominal pain and vomiting.        Colostomy    Genitourinary: Negative for dysuria.   Musculoskeletal: Positive for back pain. Negative for myalgias.        Discomfort at drain site    Skin: Negative for rash.   Neurological: Negative for dizziness, weakness and headaches.   Endo/Heme/Allergies: Does not bruise/bleed easily.   Psychiatric/Behavioral: Negative for suicidal ideas.      Vitals:    03/25/22 0721   BP: 107/66   Pulse: (!) 57   Resp: 18   Temp: 36.4 °C (97.6 °F)   SpO2: 95%        Physical Exam  Constitutional:       Appearance: Normal appearance.   HENT:      Head: Normocephalic.      Nose: Nose normal.      Mouth/Throat:      Mouth: Mucous membranes are moist.   Eyes:      Pupils: Pupils are equal, round, and reactive to light.   Cardiovascular:      Rate and Rhythm: Normal rate.   Pulmonary:      Effort: Pulmonary effort is normal. No respiratory distress.   Abdominal:      Palpations: Abdomen is soft.      Tenderness: There is no abdominal tenderness.      Comments: Colostomy    Musculoskeletal:         General: No tenderness or deformity.      Cervical back: Normal range of motion.        Back:    Skin:     General: Skin is warm and dry.      Capillary Refill: Capillary refill takes less than 2 seconds.      Coloration: Skin is not jaundiced or pale.   Neurological:      General: No focal deficit present.      Mental Status: He is alert.      Motor: No weakness.   Psychiatric:         Mood and Affect: Mood normal.         Behavior: Behavior normal.             Labs    Recent Labs     03/23/22  0131 03/24/22 0059 03/25/22  0255   WBC 7.4 6.2 5.8   RBC 4.10* 4.27* 4.54*   HEMOGLOBIN 12.7* 13.3* 14.0   HEMATOCRIT 37.1* 38.3* 40.9*   MCV 90.5 89.7 90.1   MCH 31.0 31.1 30.8   MCHC 34.2 34.7 34.2   RDW 42.2 42.6 43.0   PLATELETCT 275 327 359   MPV 8.8* 8.9* 9.0     Recent Labs     03/23/22  0131 03/24/22  0059 03/25/22  0255   SODIUM 139 140 135   POTASSIUM 3.3* 3.5* 3.2*   CHLORIDE 101 101  100   CO2 23 27 24   GLUCOSE 86 97 99   BUN 7* 5* 8   CREATININE 0.63 0.84 0.78   CALCIUM 8.7 9.0 8.8     Recent Labs     03/23/22  0131 03/24/22  0059 03/25/22  0255   CREATININE 0.63 0.84 0.78     CT-DRAIN-PERITONEAL   Final Result      1.  CT GUIDED RETROPERITONEAL PELVIC/PRESACRAL CATHETER DRAINAGE. INITIAL FLUID RETURN RESEMBLED ENTERIC CONTENTS. A FISTULOUS LEAK MAY BE PRESENT. LIKEWISE, THE AIR CONTENT SUGGESTS THE POSSIBILITY OF COMMUNICATION WITH BOWEL.   2.  THE CURRENT PLAN IS TO MONITOR DRAINAGE OUTPUT, IRRIGATE DAILY AND OBTAIN A FOLLOWUP CT SCAN IN 5-7 DAYS IF CLINICALLY INDICATED.   3. ABSCESSOGRAM MAY BE OF INTEREST PRIOR TO CATHETER REMOVAL TO DETERMINE IF THERE IS FISTULOUS COMMUNICATION WITH SMALL BOWEL IN THE PELVIS.      OUTSIDE IMAGES-CT ABDOMEN /PELVIS   Final Result      CT-ABDOMEN-PELVIS WITH    (Results Pending)       INR   Date Value Ref Range Status   03/21/2022 1.05 0.87 - 1.13 Final     Comment:     INR - Non-therapeutic Reference Range: 0.87-1.13  INR - Therapeutic Reference Range: 2.0-4.0       No results found for: POCINR     Intake/Output Summary (Last 24 hours) at 3/23/2022 1341  Last data filed at 3/23/2022 1147  Gross per 24 hour   Intake 633.75 ml   Output 2610 ml   Net -1976.25 ml      Labs not explicitly included in this progress note were reviewed by the author. Radiology/imaging not explicitly included in this progress note was reviewed by the author.     I have performed a physical exam and reviewed and updated ROS and Plan today (3/25/2022).     15 minutes in directly providing and coordinating care and extensive data review.  No time overlap and excludes procedures.

## 2022-03-25 NOTE — PROGRESS NOTES
"    DATE: 3/25/2022    Hospital Day 4 small bowel obstruction.    INTERVAL EVENTS:  CT shows likely fistula. Output down.  Tolerating reg diet. No abd pain.     PHYSICAL EXAMINATION:  Vital Signs: /66   Pulse (!) 57   Temp 36.4 °C (97.6 °F) (Temporal)   Resp 18   Ht 1.88 m (6' 2.02\")   Wt 88.8 kg (195 lb 12.3 oz)   SpO2 95%     Physical Exam  Constitutional:       General: He is not in acute distress.     Appearance: He is well-developed. He is not ill-appearing.      Comments: Up walking in room   Neck:      Thyroid: No thyromegaly.   Pulmonary:      Effort: Pulmonary effort is normal. No respiratory distress.   Abdominal:      General: Bowel sounds are normal. There is no distension.      Palpations: Abdomen is soft.      Tenderness: There is no abdominal tenderness. There is no guarding or rebound.      Comments: Multiple surgical scars  Ileostomy present in the RLQ, dark/brown liquid output  NETTA drain in place with bilious drainage   Musculoskeletal:         General: No tenderness. Normal range of motion.      Cervical back: Neck supple.   Skin:     General: Skin is warm and dry.      Findings: No rash.   Neurological:      General: No focal deficit present.      Mental Status: He is alert.           LABORATORY VALUES:   Recent Labs     03/23/22  0131 03/24/22  0059 03/25/22  0255   WBC 7.4 6.2 5.8   RBC 4.10* 4.27* 4.54*   HEMOGLOBIN 12.7* 13.3* 14.0   HEMATOCRIT 37.1* 38.3* 40.9*   MCV 90.5 89.7 90.1   MCH 31.0 31.1 30.8   MCHC 34.2 34.7 34.2   RDW 42.2 42.6 43.0   PLATELETCT 275 327 359   MPV 8.8* 8.9* 9.0     Recent Labs     03/23/22  0131 03/24/22  0059 03/25/22  0255   SODIUM 139 140 135   POTASSIUM 3.3* 3.5* 3.2*   CHLORIDE 101 101 100   CO2 23 27 24   GLUCOSE 86 97 99   BUN 7* 5* 8   CREATININE 0.63 0.84 0.78   CALCIUM 8.7 9.0 8.8                IMAGING:   CT-DRAIN-PERITONEAL   Final Result      1.  CT GUIDED RETROPERITONEAL PELVIC/PRESACRAL CATHETER DRAINAGE. INITIAL FLUID RETURN RESEMBLED " ENTERIC CONTENTS. A FISTULOUS LEAK MAY BE PRESENT. LIKEWISE, THE AIR CONTENT SUGGESTS THE POSSIBILITY OF COMMUNICATION WITH BOWEL.   2.  THE CURRENT PLAN IS TO MONITOR DRAINAGE OUTPUT, IRRIGATE DAILY AND OBTAIN A FOLLOWUP CT SCAN IN 5-7 DAYS IF CLINICALLY INDICATED.   3. ABSCESSOGRAM MAY BE OF INTEREST PRIOR TO CATHETER REMOVAL TO DETERMINE IF THERE IS FISTULOUS COMMUNICATION WITH SMALL BOWEL IN THE PELVIS.      OUTSIDE IMAGES-CT ABDOMEN /PELVIS   Final Result      CT-ABDOMEN-PELVIS WITH    (Results Pending)       ASSESSMENT AND PLAN:   * Small bowel obstruction (HCC)- (present on admission)  Assessment & Plan  Medical management   NG for any N/V  Ice chips   3/23 Advance diet as tolerated        Abdominal fluid collection- (present on admission)  Assessment & Plan  Fluid collection in pelvis  IR drain placed 3/22  Bilious drainage  3/24 CT to evaluate for fistula- likely fistula   3/25 NETTA output down,    Given history of pelvic surgery and radiation, would treat fistula non operatively with NETTA. As long as output coming down, cont reg diet.       ____________________________________     Edda Parker M.D.    DD: 3/24/2022  10:23 AM

## 2022-03-25 NOTE — PROGRESS NOTES
Infectious Disease Progress Note    Author: Hilaria Ho M.D. Date & Time of service: 3/25/2022  11:44 AM    Chief Complaint:  abd abscess  Probable fistula      Interval History:  51 y.o. male with h/o recurrent colon cancer admitted 3/21/2022 as transfer from [place] for N/V due to SBO. Found to have presacral abscess and probable fistula  3/25 AF WBC 5.8 tolerating some PO No immediate plan for surgery    Labs Reviewed, Medications Reviewed and Wound Reviewed.    Review of Systems:  Review of Systems   Constitutional: Negative for fever.   Gastrointestinal: Negative for nausea and vomiting.        Mild abd pain       Hemodynamics:  Temp (24hrs), Av.7 °C (98 °F), Min:36.4 °C (97.6 °F), Max:37.1 °C (98.7 °F)  Temperature: 36.4 °C (97.6 °F)  Pulse  Av.8  Min: 53  Max: 112   Blood Pressure: 107/66       Physical Exam:  Physical Exam  Vitals and nursing note reviewed.   Constitutional:       General: He is not in acute distress.     Appearance: He is not ill-appearing, toxic-appearing or diaphoretic.   HENT:      Nose: No rhinorrhea.      Mouth/Throat:      Pharynx: No oropharyngeal exudate.   Eyes:      General: No scleral icterus.     Extraocular Movements: Extraocular movements intact.      Pupils: Pupils are equal, round, and reactive to light.   Pulmonary:      Effort: No respiratory distress.   Abdominal:      General: There is no distension.      Tenderness: There is abdominal tenderness.      Comments: Drain  Ostomy  Well healed midline scar   Musculoskeletal:      Cervical back: Neck supple. No rigidity.   Skin:     Coloration: Skin is pale. Skin is not jaundiced.      Findings: Bruising present.   Neurological:      General: No focal deficit present.      Mental Status: He is alert and oriented to person, place, and time.   Psychiatric:         Mood and Affect: Mood normal.         Behavior: Behavior normal.         Meds:    Current Facility-Administered Medications:   •  potassium  chloride SA  •  [COMPLETED] piperacillin-tazobactam **AND** piperacillin-tazobactam  •  HYDROcodone-acetaminophen  •  diphenhydrAMINE  •  morphine injection  •  senna-docusate **AND** polyethylene glycol/lytes **AND** magnesium hydroxide **AND** bisacodyl  •  enoxaparin (LOVENOX) injection  •  acetaminophen  •  Notify provider if pain remains uncontrolled **AND** Use the Numeric Rating Scale (NRS), Ervin-Baker Faces (WBF), or FLACC on regular floors and Critical-Care Pain Observation Tool (CPOT) on ICUs/Trauma to assess pain **AND** Pulse Ox **AND** Pharmacy Consult Request **AND** If patient difficult to arouse and/or has respiratory depression (respiratory rate of 10 or less), stop any opiates that are currently infusing and call a Rapid Response.  •  labetalol  •  ondansetron  •  ondansetron  •  promethazine  •  promethazine  •  prochlorperazine    Labs:  Recent Labs     03/23/22 0131 03/24/22 0059 03/25/22  0255   WBC 7.4 6.2 5.8   RBC 4.10* 4.27* 4.54*   HEMOGLOBIN 12.7* 13.3* 14.0   HEMATOCRIT 37.1* 38.3* 40.9*   MCV 90.5 89.7 90.1   MCH 31.0 31.1 30.8   RDW 42.2 42.6 43.0   PLATELETCT 275 327 359   MPV 8.8* 8.9* 9.0   NEUTSPOLYS 67.50 65.50 56.90   LYMPHOCYTES 24.00 25.20 34.10   MONOCYTES 6.20 7.40 6.70   EOSINOPHILS 1.50 1.10 1.50   BASOPHILS 0.40 0.50 0.50     Recent Labs     03/23/22 0131 03/24/22 0059 03/25/22  0255   SODIUM 139 140 135   POTASSIUM 3.3* 3.5* 3.2*   CHLORIDE 101 101 100   CO2 23 27 24   GLUCOSE 86 97 99   BUN 7* 5* 8     Recent Labs     03/23/22 0131 03/24/22 0059 03/25/22  0255   CREATININE 0.63 0.84 0.78       Imaging:  CT-DRAIN-PERITONEAL    Result Date: 3/22/2022  3/22/2022 4:31 PM HISTORY/REASON FOR EXAM:  Noted intra-abdominal fluid collection near bladder on outside CT scan. Presacral fluid collection. History of total colectomy. Right lower quadrant ileostomy. Intermittent small bowel obstruction. TECHNIQUE/EXAM DESCRIPTION: Pelvic (presacral/retroperitoneal) abscess drainage  with CT guidance. Low dose optimization technique was utilized for this CT exam including automated exposure control and adjustment of the mA and/or kV according to patient size. PROCEDURE: Informed consent was obtained. Localizing CT images were obtained with the patient in prone position. The skin was prepped with ChloraPrep and draped in a sterile fashion. Moderate sedation was provided. Pulse oximetry and continuous cardiac monitoring by the nurse was performed throughout the exam. Intraservice time was 30 minutes. Following local anesthesia with 1% Lidocaine, a 17 G guiding needle was placed and needle path via a left transgluteal approach confirmed with CT. An Amplatz guidewire was placed and following serial tract dilatation, a 8 Japanese pigtail locking catheter was placed. A specimen was collected and submitted for culture and sensitivity and Gram stain. Total of 10 mL initially butterscotch colored fluid turning to pink opaque fluid was drained. The initial fluid returned resembled enteric contents. The catheter was secured to the skin and connected to suction bulb drainage. Final CT images were obtained documenting catheter position. The patient tolerated the procedure well with no evidence of complication. COMPARISON: CT of the abdomen and pelvis outside films 3/20/2022 FINDINGS: Initial CT images again show an approximately 3.7 cm presacral collection with an air-fluid level. The final CT images show satisfactory catheter position within the targeted presacral collection.     1.  CT GUIDED RETROPERITONEAL PELVIC/PRESACRAL CATHETER DRAINAGE. INITIAL FLUID RETURN RESEMBLED ENTERIC CONTENTS. A FISTULOUS LEAK MAY BE PRESENT. LIKEWISE, THE AIR CONTENT SUGGESTS THE POSSIBILITY OF COMMUNICATION WITH BOWEL. 2.  THE CURRENT PLAN IS TO MONITOR DRAINAGE OUTPUT, IRRIGATE DAILY AND OBTAIN A FOLLOWUP CT SCAN IN 5-7 DAYS IF CLINICALLY INDICATED. 3. ABSCESSOGRAM MAY BE OF INTEREST PRIOR TO CATHETER REMOVAL TO DETERMINE  IF THERE IS FISTULOUS COMMUNICATION WITH SMALL BOWEL IN THE PELVIS.    CT-ABDOMEN&PELVIS ENTEROGRAPHY    Result Date: 3/25/2022  CT abdomen and pelvis with contrast 3/24/2022 1725 hours is HISTORY/REASON FOR EXAM:  Bowel obstruction suspected; Pelvic fluid collection - S/P IR drain placement.  Pt with Hx of total abdominal colectomy. possible small bowel fistula; Possible fistula to pelvic fluid collection TECHNIQUE/EXAM DESCRIPTION:  CT abdomen and pelvis with contrast, enterography protocol. Following oral administration of VoLumen oral contrast and water as well as intravenous administration of 100 mL of Omnipaque 350 nonionic contrast, thin section helical CT is performed from the level of the diaphragm through the ischial tuberosities. Axial, coronal, and sagittal images are sent to PACS. Low dose optimization technique was utilized for this CT exam including automated exposure control and adjustment of the mA and/or kV according to patient size. COMPARISON:  Outside CT abdomen and pelvis 3/20/2022 FINDINGS: The study was ordered and performed as CT enterography which utilizes negative oral contrast. This precludes assessment of a fistula. We called the patient's caregivers recommending that the scan be repeated and that is not possible at this time therefore, the scan is being interpreted as performed. LUNGS: The lung bases are clear. Left ventricle is probably dilated. Osseous structures:  No significant finding. Abdomen: LIVER: is normal in appearance. GALLBLADDER and BILIARY SYSTEM The gallbladder is normal in CT appearance. There is no biliary dilation. SPLEEN: Normal in appearance. PANCREAS: The pancreas is normal in appearance. The splenic vein and portal vein enhance normally. ADRENAL glands: Normal in appearance. RIGHT kidney: Normal in appearance. LEFT kidney: Normal in appearance. There is no hydronephrosis. BOWEL and MESENTERY: There is a right-sided ileostomy. There has been a colectomy. There is a  presacral fluid collection containing a catheter. Fluid collection measures 2.9 x 2.2 cm consistent with an abscess and may indicate leakage. AORTA and VASCULATURE: Atherosclerosis without aneurysm. Pelvis: There is possibly a rectal mass versus prostate enlargement.     1.  Unchanged presacral/precoccygeal space fluid collection measuring 2.9 x 2.2 cm consistent with abscess 2.  Prior colectomy and there is a right lower quadrant ostomy 3.  No direct evidence for fistula although cannot be excluded with the negative contrast administered for enterography. Consider repeat CT scan with standard oral contrast when clinically appropriate 4.  Postoperative changes in the presacral/precoccygeal space 5.  Mass contiguous with the inferior aspect of rectal stump which could be tumor versus enlarged prostate. 6.  No other significant finding      Micro:  Results     Procedure Component Value Units Date/Time    BLOOD CULTURE [[id number]]     Order Status: Sent Specimen: Blood from Peripheral     BLOOD CULTURE [[id number]]     Order Status: Sent Specimen: Blood from Peripheral     FLUID CULTURE W/GRAM STAIN [[id number]]  (Abnormal)  (Susceptibility) Collected: 03/22/22 1606    Order Status: Completed Specimen: Body Fluid Updated: 03/24/22 1252     Significant Indicator POS     Source BF     Site Peritoneal Fluid     Culture Result Light growth mixed skin andrei.     Gram Stain Result Moderate WBCs.  Few Gram negative rods.       Culture Result Escherichia coli  Light growth      Narrative:      order req#[id number]    Susceptibility     Escherichia coli (1)     Antibiotic Interpretation Microscan   Method Status    Ampicillin Sensitive <=8 mcg/mL PATTIE Final    Ceftriaxone Sensitive <=1 mcg/mL PATTIE Final    Cefazolin Sensitive <=2 mcg/mL PATTIE Final    Ciprofloxacin Sensitive <=0.25 mcg/mL PATTIE Final    Cefepime Sensitive <=2 mcg/mL PATTIE Final    Cefuroxime Sensitive <=4 mcg/mL PATTIE Final    Ampicillin/sulbactam Sensitive <=4/2 mcg/mL PATTIE  Final    Ertapenem Sensitive <=0.5 mcg/mL PATTIE Final    Tobramycin Sensitive <=2 mcg/mL PATTIE Final    Gentamicin Sensitive <=2 mcg/mL PATTIE Final    Minocycline Sensitive <=4 mcg/mL PATTIE Final    Moxifloxacin Sensitive <=2 mcg/mL PATTIE Final    Pip/Tazobactam Sensitive <=8 mcg/mL PATTIE Final    Trimeth/Sulfa Sensitive <=0.5/9.5 mcg/mL PATTIE Final    Tigecycline Sensitive <=2 mcg/mL PATTIE Final                   GRAM STAIN [[id number]] Collected: 03/22/22 1606    Order Status: Completed Specimen: Body Fluid Updated: 03/23/22 0611     Significant Indicator .     Source BF     Site Peritoneal Fluid     Gram Stain Result Moderate WBCs.  Few Gram negative rods.      Narrative:      order req#[id number]    FLUID CULTURE W/GRAM STAIN [[id number]]     Order Status: No result Specimen: Other Body Fluid           Assessment:  Active Hospital Problems    Diagnosis    • *Small bowel obstruction (HCC) [K56.609]    • Abdominal fluid collection [R18.8]    • Tobacco chew use [Z72.0]    • Leukocytosis [D72.829]    • Hypokalemia [E87.6]      Recurrent SBO  H/o recurrent colon cancer-rectal mass on CT  Probable fistula vs perforation  Intraabd abscess-no improved  Leukocytosis resolved        PLAN:   Continue Zosyn for now pending final cultures-Ecoli so far  Repeat CT today : Unchanged presacral/precoccygeal space fluid collection measuring 2.9 x 2.2 cm consistent with abscess 2.  Prior colectomy and there is a right lower quadrant ostomy 3.  No direct evidence for fistula although cannot be excluded with the negative contrast administered for enterography. Consider repeat CT scan with standard oral contrast when clinically appropriate 4.  Postoperative changes in the presacral/precoccygeal space 5.  Mass contiguous with the inferior aspect of rectal stump which could be tumor versus enlarged prostate  Need for larger drain per IR. No current plan for surgery  If fistula present will require weeks of antibiotics + drain to promote healing. Will not  need IV if no fistula-please clarify  Recommend PICC if fistula as will require extended course IV antibiotics to promote spontaneous closure  Final antibiotic recommendations per culture results and clinical course  Further eval rectal stump mass per PCT

## 2022-03-25 NOTE — PROGRESS NOTES
"This RN heard patient using profanities toward CNA from nurses station. CNA reports patient was angry regarding diet tray. This RN went to speak with patient. This RN told patient it is never appropriate to use profanities towards staff members. Patient continued saying \"I fucking had a TV tray earlier, the fucking doctor said I could have fucking solid food\" This RN reminded patient about appropriate communication and informed him if his profanities continued security would be called.   This RN discussed with Hospitalist, and surgery APRN. Per surgery APRN patient to be NPO until results of imaging. Primary RN updated. Primary RN to discuss with patient.  "

## 2022-03-25 NOTE — ASSESSMENT & PLAN NOTE
Fluid collection in pelvis  IR drain placed 3/22  Bilious drainage  3/24 CT to evaluate for fistula- likely fistula   3/25 NETTA output decreasing  3/27 NETTA output decreasing   3/28 Repeat CT shows no fluid collection near drain  3/29 X-ray abdomen to include drain to determine if fistula is present  3/30 Small bowel fistula identified to small pelvic abscess  No surgical intervention, follow up with  outpatient

## 2022-03-25 NOTE — CARE PLAN
The patient is Stable - Low risk of patient condition declining or worsening    Shift Goals  Clinical Goals: pain management  Patient Goals: pain management, sleep    Progress made toward(s) clinical / shift goals:  Pt endorses moderate abd pain, medicated with PRNs, pt reports some relief.      Problem: Knowledge Deficit - Standard  Goal: Patient and family/care givers will demonstrate understanding of plan of care, disease process/condition, diagnostic tests and medications  Outcome: Progressing     Problem: Pain - Standard  Goal: Alleviation of pain or a reduction in pain to the patient’s comfort goal  Outcome: Progressing       Patient is not progressing towards the following goals:

## 2022-03-25 NOTE — PROGRESS NOTES
Pt AO x 4  Vitals signs stable  Pt denies chest pain or SOB  O2 sat >90% on RA breathing unlabored  Pt endorses moderate abd pain, medicated with PRNs.   Pt denies N/V/D  + voiding, + output in ileostomy   IR drain to L flank, minimal output.     Updated plan of care discussed with pt. Safety education done. Falls precautions in place.   Pt safety maintained. Hourly rounding done.

## 2022-03-25 NOTE — PROGRESS NOTES
Hospital Medicine Daily Progress Note    Date of Service  3/25/2022    Chief Complaint  Beau Mcintyre is a 51 y.o. male admitted 3/21/2022 with h/o colorectal cancer and multiple surgeries with end ileostomy and recurrent SBO's presents from outside hospital with recurrent SBO and possible posterior bladder intra abdominal fluid collection    Hospital Course  No notes on file    Interval Problem Update  Intra-abdominal fluid collection-drain continues to have output, 60 mL in the last 24 hours. Initial read of CT showed fistula, but definitive rad's read shows unclear e/o fistula but does show a continued fluid collection in the pelvic area about 3 cm and possible rectal mass versus enlarged prostate. Patient is tolerating a regular diet without issue. His biggest pain issue is the insertion site of the drain in the gluteal area. Afebrile, WBC remains normal. He remains on IV zosyn.       I have personally seen and examined the patient at bedside. I discussed the plan of care with patient.    Consultants/Specialty  general surgery    Code Status  Full Code    Disposition  Patient is not medically cleared for discharge.   Anticipate discharge to to home with close outpatient follow-up.  I have placed the appropriate orders for post-discharge needs.    Review of Systems  Review of Systems   Constitutional: Negative for chills and fever.   Cardiovascular: Negative for chest pain and palpitations.   Gastrointestinal: Positive for diarrhea (chronic). Negative for abdominal pain, nausea and vomiting.        Doing ok, eating food well   Musculoskeletal: Negative for joint pain and myalgias.   All other systems reviewed and are negative.       Physical Exam  Temp:  [36.4 °C (97.6 °F)-37.1 °C (98.7 °F)] 36.4 °C (97.6 °F)  Pulse:  [53-64] 57  Resp:  [18] 18  BP: (101-115)/(64-74) 107/66  SpO2:  [93 %-96 %] 95 %    Physical Exam  Vitals and nursing note reviewed.   Constitutional:       General: He is not in acute distress.      Appearance: He is well-developed. He is ill-appearing (chronically).   HENT:      Head: Normocephalic and atraumatic.      Mouth/Throat:      Mouth: Mucous membranes are dry.      Pharynx: No oropharyngeal exudate.   Eyes:      General: No scleral icterus.     Pupils: Pupils are equal, round, and reactive to light.   Neck:      Thyroid: No thyromegaly.   Cardiovascular:      Rate and Rhythm: Normal rate and regular rhythm.      Heart sounds: Normal heart sounds. No murmur heard.  Pulmonary:      Effort: Pulmonary effort is normal. No respiratory distress.      Breath sounds: Normal breath sounds. No wheezing.   Abdominal:      General: Bowel sounds are normal. There is no distension.      Palpations: Abdomen is soft.      Tenderness: There is no abdominal tenderness.      Comments: Multiple surgical scars  Ileostomy present in the RLQ, dark/brown liquid output  NETTA drain in place with some possible enteric contents present, slightly less amount noted   Musculoskeletal:         General: No tenderness. Normal range of motion.      Cervical back: Normal range of motion and neck supple.   Skin:     General: Skin is warm and dry.      Findings: No rash.   Neurological:      Mental Status: He is alert and oriented to person, place, and time.      Cranial Nerves: No cranial nerve deficit.         Fluids    Intake/Output Summary (Last 24 hours) at 3/25/2022 1410  Last data filed at 3/25/2022 0721  Gross per 24 hour   Intake --   Output 20 ml   Net -20 ml       Laboratory  Recent Labs     03/23/22  0131 03/24/22  0059 03/25/22  0255   WBC 7.4 6.2 5.8   RBC 4.10* 4.27* 4.54*   HEMOGLOBIN 12.7* 13.3* 14.0   HEMATOCRIT 37.1* 38.3* 40.9*   MCV 90.5 89.7 90.1   MCH 31.0 31.1 30.8   MCHC 34.2 34.7 34.2   RDW 42.2 42.6 43.0   PLATELETCT 275 327 359   MPV 8.8* 8.9* 9.0     Recent Labs     03/23/22  0131 03/24/22  0059 03/25/22  0255   SODIUM 139 140 135   POTASSIUM 3.3* 3.5* 3.2*   CHLORIDE 101 101 100   CO2 23 27 24   GLUCOSE 86  97 99   BUN 7* 5* 8   CREATININE 0.63 0.84 0.78   CALCIUM 8.7 9.0 8.8                   Imaging  CT-ABDOMEN&PELVIS ENTEROGRAPHY   Final Result      1.  Unchanged presacral/precoccygeal space fluid collection measuring 2.9 x 2.2 cm consistent with abscess      2.  Prior colectomy and there is a right lower quadrant ostomy      3.  No direct evidence for fistula although cannot be excluded with the negative contrast administered for enterography. Consider repeat CT scan with standard oral contrast when clinically appropriate      4.  Postoperative changes in the presacral/precoccygeal space      5.  Mass contiguous with the inferior aspect of rectal stump which could be tumor versus enlarged prostate.      6.  No other significant finding      CT-DRAIN-PERITONEAL   Final Result      1.  CT GUIDED RETROPERITONEAL PELVIC/PRESACRAL CATHETER DRAINAGE. INITIAL FLUID RETURN RESEMBLED ENTERIC CONTENTS. A FISTULOUS LEAK MAY BE PRESENT. LIKEWISE, THE AIR CONTENT SUGGESTS THE POSSIBILITY OF COMMUNICATION WITH BOWEL.   2.  THE CURRENT PLAN IS TO MONITOR DRAINAGE OUTPUT, IRRIGATE DAILY AND OBTAIN A FOLLOWUP CT SCAN IN 5-7 DAYS IF CLINICALLY INDICATED.   3. ABSCESSOGRAM MAY BE OF INTEREST PRIOR TO CATHETER REMOVAL TO DETERMINE IF THERE IS FISTULOUS COMMUNICATION WITH SMALL BOWEL IN THE PELVIS.      OUTSIDE IMAGES-CT ABDOMEN /PELVIS   Final Result           Assessment/Plan  * Small bowel obstruction (HCC)- (present on admission)  Assessment & Plan  Resolved  Good ostomy output  Tolerating regular diet    Rectal mass- (present on admission)  Assessment & Plan  Incidentally discovered on CT scan  Patient has an end ileostomy, does not describe any rectal pain or BRBPR  Possible enlarged prostate on differential, though patient denies any class LUTS  Check CEA  Check PSA with reflex to free PSA  Outpatient work up    Tobacco chew use- (present on admission)  Assessment & Plan  Recommend cessation    Abdominal fluid collection-  (present on admission)  Assessment & Plan  Noted on outside hospital CT, clincally stable  Per outside CT abd: 3.7 by 3.9 cm fluid collection posterior to bladder which is an increase from prior imaging study on 3/10/2022  IR drain placed 3/22, possible fistulous communication with bowel, but likely chronic  Initially did not appear infected, but now intra operative fluid cultures c/w at this time with pan-sensitive E coli  Started IV zosyn 3.25  CT enterography shows no clear e/o fistula (surgery thinks there is one) but does show a 3 cm fluid collection (so improving by 1 cm compared to outside scan)  Critical to determine if fistula versus abscess, since treatment will be dramatically different  CT A/P Oral/IV contrast for further elucidation  GS and IR and ID following, appreciate all their recommendations  Defer PICC line for now  Continue regular diet for now    Leukocytosis- (present on admission)  Assessment & Plan  Resolved initially withoiut abx's  Now appears on imaging with possible intra-abdominal abscess versus fistula versus both  See below  Trend WBC/fever curve    Hypokalemia- (present on admission)  Assessment & Plan  Mild again, replacement, daily labs, from high output ileostomy       VTE prophylaxis: lovenox    I have performed a physical exam and reviewed and updated ROS and Plan today (3/25/2022). In review of yesterday's note (3/24/2022), there are no changes except as documented above.

## 2022-03-25 NOTE — PROGRESS NOTES
Pt endorsed itchiness and hives when taking oxycodone the previous night. Pt denied this occurrence in the past when taking oxycodone. Notified on-call hospitalist, Norco and benadryl ordered. Pt took Norco and no S/E noted.

## 2022-03-26 LAB
ANION GAP SERPL CALC-SCNC: 12 MMOL/L (ref 7–16)
BASOPHILS # BLD AUTO: 1.2 % (ref 0–1.8)
BASOPHILS # BLD: 0.07 K/UL (ref 0–0.12)
BUN SERPL-MCNC: 9 MG/DL (ref 8–22)
CALCIUM SERPL-MCNC: 8.7 MG/DL (ref 8.5–10.5)
CHLORIDE SERPL-SCNC: 102 MMOL/L (ref 96–112)
CO2 SERPL-SCNC: 22 MMOL/L (ref 20–33)
CREAT SERPL-MCNC: 1 MG/DL (ref 0.5–1.4)
EOSINOPHIL # BLD AUTO: 0.12 K/UL (ref 0–0.51)
EOSINOPHIL NFR BLD: 2 % (ref 0–6.9)
ERYTHROCYTE [DISTWIDTH] IN BLOOD BY AUTOMATED COUNT: 43.1 FL (ref 35.9–50)
GFR SERPLBLD CREATININE-BSD FMLA CKD-EPI: 92 ML/MIN/1.73 M 2
GLUCOSE SERPL-MCNC: 90 MG/DL (ref 65–99)
HCT VFR BLD AUTO: 41.5 % (ref 42–52)
HGB BLD-MCNC: 14.4 G/DL (ref 14–18)
IMM GRANULOCYTES # BLD AUTO: 0.03 K/UL (ref 0–0.11)
IMM GRANULOCYTES NFR BLD AUTO: 0.5 % (ref 0–0.9)
LYMPHOCYTES # BLD AUTO: 2.04 K/UL (ref 1–4.8)
LYMPHOCYTES NFR BLD: 34.8 % (ref 22–41)
MAGNESIUM SERPL-MCNC: 1.8 MG/DL (ref 1.5–2.5)
MCH RBC QN AUTO: 31.5 PG (ref 27–33)
MCHC RBC AUTO-ENTMCNC: 34.7 G/DL (ref 33.7–35.3)
MCV RBC AUTO: 90.8 FL (ref 81.4–97.8)
MONOCYTES # BLD AUTO: 0.42 K/UL (ref 0–0.85)
MONOCYTES NFR BLD AUTO: 7.2 % (ref 0–13.4)
NEUTROPHILS # BLD AUTO: 3.18 K/UL (ref 1.82–7.42)
NEUTROPHILS NFR BLD: 54.3 % (ref 44–72)
NRBC # BLD AUTO: 0 K/UL
NRBC BLD-RTO: 0 /100 WBC
PLATELET # BLD AUTO: 382 K/UL (ref 164–446)
PMV BLD AUTO: 9.2 FL (ref 9–12.9)
POTASSIUM SERPL-SCNC: 3.6 MMOL/L (ref 3.6–5.5)
RBC # BLD AUTO: 4.57 M/UL (ref 4.7–6.1)
SODIUM SERPL-SCNC: 136 MMOL/L (ref 135–145)
WBC # BLD AUTO: 5.9 K/UL (ref 4.8–10.8)

## 2022-03-26 PROCEDURE — 36415 COLL VENOUS BLD VENIPUNCTURE: CPT

## 2022-03-26 PROCEDURE — 99232 SBSQ HOSP IP/OBS MODERATE 35: CPT | Mod: FS | Performed by: NURSE PRACTITIONER

## 2022-03-26 PROCEDURE — 700111 HCHG RX REV CODE 636 W/ 250 OVERRIDE (IP): Performed by: HOSPITALIST

## 2022-03-26 PROCEDURE — 85025 COMPLETE CBC W/AUTO DIFF WBC: CPT

## 2022-03-26 PROCEDURE — 700102 HCHG RX REV CODE 250 W/ 637 OVERRIDE(OP): Performed by: INTERNAL MEDICINE

## 2022-03-26 PROCEDURE — 700111 HCHG RX REV CODE 636 W/ 250 OVERRIDE (IP): Performed by: INTERNAL MEDICINE

## 2022-03-26 PROCEDURE — A9270 NON-COVERED ITEM OR SERVICE: HCPCS | Performed by: HOSPITALIST

## 2022-03-26 PROCEDURE — 700105 HCHG RX REV CODE 258: Performed by: INTERNAL MEDICINE

## 2022-03-26 PROCEDURE — A9270 NON-COVERED ITEM OR SERVICE: HCPCS | Performed by: INTERNAL MEDICINE

## 2022-03-26 PROCEDURE — 83735 ASSAY OF MAGNESIUM: CPT

## 2022-03-26 PROCEDURE — 700102 HCHG RX REV CODE 250 W/ 637 OVERRIDE(OP): Performed by: HOSPITALIST

## 2022-03-26 PROCEDURE — 80048 BASIC METABOLIC PNL TOTAL CA: CPT

## 2022-03-26 PROCEDURE — 770001 HCHG ROOM/CARE - MED/SURG/GYN PRIV*

## 2022-03-26 PROCEDURE — 99232 SBSQ HOSP IP/OBS MODERATE 35: CPT | Performed by: INTERNAL MEDICINE

## 2022-03-26 PROCEDURE — 99233 SBSQ HOSP IP/OBS HIGH 50: CPT | Performed by: INTERNAL MEDICINE

## 2022-03-26 RX ADMIN — POTASSIUM CHLORIDE 20 MEQ: 1500 TABLET, EXTENDED RELEASE ORAL at 17:27

## 2022-03-26 RX ADMIN — PIPERACILLIN AND TAZOBACTAM 3.38 G: 3; .375 INJECTION, POWDER, LYOPHILIZED, FOR SOLUTION INTRAVENOUS; PARENTERAL at 04:34

## 2022-03-26 RX ADMIN — HYDROCODONE BITARTRATE AND ACETAMINOPHEN 1 TABLET: 5; 325 TABLET ORAL at 12:44

## 2022-03-26 RX ADMIN — POTASSIUM CHLORIDE 20 MEQ: 1500 TABLET, EXTENDED RELEASE ORAL at 04:39

## 2022-03-26 RX ADMIN — SENNOSIDES AND DOCUSATE SODIUM 2 TABLET: 50; 8.6 TABLET ORAL at 17:27

## 2022-03-26 RX ADMIN — HYDROCODONE BITARTRATE AND ACETAMINOPHEN 1 TABLET: 5; 325 TABLET ORAL at 04:35

## 2022-03-26 RX ADMIN — ENOXAPARIN SODIUM 40 MG: 40 INJECTION SUBCUTANEOUS at 17:27

## 2022-03-26 RX ADMIN — MORPHINE SULFATE 2 MG: 4 INJECTION INTRAVENOUS at 16:46

## 2022-03-26 RX ADMIN — PIPERACILLIN AND TAZOBACTAM 3.38 G: 3; .375 INJECTION, POWDER, LYOPHILIZED, FOR SOLUTION INTRAVENOUS; PARENTERAL at 12:45

## 2022-03-26 RX ADMIN — MORPHINE SULFATE 2 MG: 4 INJECTION INTRAVENOUS at 23:46

## 2022-03-26 RX ADMIN — PIPERACILLIN AND TAZOBACTAM 3.38 G: 3; .375 INJECTION, POWDER, LYOPHILIZED, FOR SOLUTION INTRAVENOUS; PARENTERAL at 19:46

## 2022-03-26 RX ADMIN — MORPHINE SULFATE 2 MG: 4 INJECTION INTRAVENOUS at 00:57

## 2022-03-26 RX ADMIN — HYDROCODONE BITARTRATE AND ACETAMINOPHEN 1 TABLET: 5; 325 TABLET ORAL at 19:45

## 2022-03-26 ASSESSMENT — ENCOUNTER SYMPTOMS
ABDOMINAL PAIN: 0
DIARRHEA: 1
FEVER: 0
NAUSEA: 0
PALPITATIONS: 0
CHILLS: 0
MYALGIAS: 0
COUGH: 0
VOMITING: 0
ROS GI COMMENTS: MILD ABD PAIN

## 2022-03-26 ASSESSMENT — PAIN DESCRIPTION - PAIN TYPE
TYPE: ACUTE PAIN
TYPE: ACUTE PAIN

## 2022-03-26 NOTE — PROGRESS NOTES
"    DATE: 3/26/2022    Hospital Day 5 sbo.    INTERVAL EVENTS:  Patient with no complaints  Drain with bilious appearing fluid in NETTA drain.  Fluid in drain coming out of the body is more thick and brown in nature  WBC 5.9  CT 3/25 showing no contrast is seen within the presacral fluid collection to suggest fistulous connection  PHYSICAL EXAMINATION:  Vital Signs: /76   Pulse 75   Temp 36.6 °C (97.9 °F) (Temporal)   Resp 16   Ht 1.88 m (6' 2.02\")   Wt 88.8 kg (195 lb 12.3 oz)   SpO2 93%     Physical Exam  Vitals and nursing note reviewed.   Constitutional:       General: He is not in acute distress.     Appearance: He is well-developed. He is not ill-appearing.   HENT:      Nose: Nose normal.      Mouth/Throat:      Mouth: Mucous membranes are moist.      Pharynx: No oropharyngeal exudate.   Eyes:      General: No scleral icterus.  Neck:      Thyroid: No thyromegaly.   Cardiovascular:      Rate and Rhythm: Normal rate and regular rhythm.      Heart sounds: Normal heart sounds. No murmur heard.  Pulmonary:      Effort: Pulmonary effort is normal. No respiratory distress.      Breath sounds: Normal breath sounds. No wheezing.   Abdominal:      General: Bowel sounds are normal. There is no distension.      Palpations: Abdomen is soft.      Tenderness: There is no abdominal tenderness. There is no guarding or rebound.      Comments: Multiple surgical scars  Ileostomy present in the RLQ, dark/brown liquid output  NETTA drain in place with enteric drainage in the bulb itself but thick brown material noted in drain tube from a skin   Musculoskeletal:         General: No tenderness. Normal range of motion.      Cervical back: Normal range of motion and neck supple.   Skin:     General: Skin is warm and dry.      Findings: No rash.   Neurological:      Mental Status: He is alert and oriented to person, place, and time.      Cranial Nerves: No cranial nerve deficit.           LABORATORY VALUES:   Recent Labs     " 03/24/22  0059 03/25/22  0255 03/26/22  0612   WBC 6.2 5.8 5.9   RBC 4.27* 4.54* 4.57*   HEMOGLOBIN 13.3* 14.0 14.4   HEMATOCRIT 38.3* 40.9* 41.5*   MCV 89.7 90.1 90.8   MCH 31.1 30.8 31.5   MCHC 34.7 34.2 34.7   RDW 42.6 43.0 43.1   PLATELETCT 327 359 382   MPV 8.9* 9.0 9.2     Recent Labs     03/24/22  0059 03/25/22 0255 03/26/22  0612   SODIUM 140 135 136   POTASSIUM 3.5* 3.2* 3.6   CHLORIDE 101 100 102   CO2 27 24 22   GLUCOSE 97 99 90   BUN 5* 8 9   CREATININE 0.84 0.78 1.00   CALCIUM 9.0 8.8 8.7                IMAGING:   CT-ABDOMEN-PELVIS WITH   Final Result         1. No contrast is seen within the presacral fluid collection to suggest fistulous connection.      CT-ABDOMEN&PELVIS ENTEROGRAPHY   Final Result      1.  Unchanged presacral/precoccygeal space fluid collection measuring 2.9 x 2.2 cm consistent with abscess      2.  Prior colectomy and there is a right lower quadrant ostomy      3.  No direct evidence for fistula although cannot be excluded with the negative contrast administered for enterography. Consider repeat CT scan with standard oral contrast when clinically appropriate      4.  Postoperative changes in the presacral/precoccygeal space      5.  Mass contiguous with the inferior aspect of rectal stump which could be tumor versus enlarged prostate.      6.  No other significant finding      CT-DRAIN-PERITONEAL   Final Result      1.  CT GUIDED RETROPERITONEAL PELVIC/PRESACRAL CATHETER DRAINAGE. INITIAL FLUID RETURN RESEMBLED ENTERIC CONTENTS. A FISTULOUS LEAK MAY BE PRESENT. LIKEWISE, THE AIR CONTENT SUGGESTS THE POSSIBILITY OF COMMUNICATION WITH BOWEL.   2.  THE CURRENT PLAN IS TO MONITOR DRAINAGE OUTPUT, IRRIGATE DAILY AND OBTAIN A FOLLOWUP CT SCAN IN 5-7 DAYS IF CLINICALLY INDICATED.   3. ABSCESSOGRAM MAY BE OF INTEREST PRIOR TO CATHETER REMOVAL TO DETERMINE IF THERE IS FISTULOUS COMMUNICATION WITH SMALL BOWEL IN THE PELVIS.      OUTSIDE IMAGES-CT ABDOMEN /PELVIS   Final Result           ASSESSMENT AND PLAN:   * Small bowel obstruction (HCC)- (present on admission)  Assessment & Plan  Medical management   NG for any N/V  Ice chips   3/23 Advance diet as tolerated        Abdominal fluid collection- (present on admission)  Assessment & Plan  Fluid collection in pelvis  IR drain placed 3/22  Bilious drainage  3/24 CT to evaluate for fistula- likely fistula   3/25 NETTA output decreasing. CT showing no contrast is seen within the presacral fluid collection to suggest fistulous connection.         ____________________________________     SO Kincaid.    DD: 3/26/2022  9:48 AM

## 2022-03-26 NOTE — PROGRESS NOTES
Hospital Medicine Daily Progress Note    Date of Service  3/26/2022    Chief Complaint  Beau Mcintyre is a 51 y.o. male admitted 3/21/2022 with h/o colorectal cancer and multiple surgeries with end ileostomy and recurrent SBO's presents from outside hospital with recurrent SBO and possible posterior bladder intra abdominal fluid collection    Hospital Course  No notes on file    Interval Problem Update  Intra-abdominal fluid collection-drain with minimal output overnight. Repeat CTAP shows no fistulous connection. He remains afebrile. No change in ostomy output with IV abx's. Eating regular diet without issue. Updated patient about possible rectal mass versus enlarged prostate. He does endorse mild LUT's.      I have personally seen and examined the patient at bedside. I discussed the plan of care with patient.    Consultants/Specialty  general surgery    Code Status  Full Code    Disposition  Patient is not medically cleared for discharge.   Anticipate discharge to to home with close outpatient follow-up.  I have placed the appropriate orders for post-discharge needs.    Review of Systems  Review of Systems   Constitutional: Negative for chills and fever.   Cardiovascular: Negative for chest pain and palpitations.   Gastrointestinal: Positive for diarrhea (chronic and unchanged). Negative for abdominal pain, nausea and vomiting.        Doing ok, eating food well   Musculoskeletal: Negative for joint pain and myalgias.   All other systems reviewed and are negative.       Physical Exam  Temp:  [36.4 °C (97.6 °F)-36.9 °C (98.4 °F)] 36.6 °C (97.9 °F)  Pulse:  [60-87] 75  Resp:  [16-18] 16  BP: (103-119)/(59-76) 119/76  SpO2:  [93 %-94 %] 93 %    Physical Exam  Vitals and nursing note reviewed.   Constitutional:       General: He is not in acute distress.     Appearance: He is well-developed. He is ill-appearing (chronically).   HENT:      Head: Normocephalic and atraumatic.      Mouth/Throat:      Mouth: Mucous  membranes are dry.      Pharynx: No oropharyngeal exudate.   Eyes:      General: No scleral icterus.     Pupils: Pupils are equal, round, and reactive to light.   Neck:      Thyroid: No thyromegaly.   Cardiovascular:      Rate and Rhythm: Normal rate and regular rhythm.      Heart sounds: Normal heart sounds. No murmur heard.  Pulmonary:      Effort: Pulmonary effort is normal. No respiratory distress.      Breath sounds: Normal breath sounds. No wheezing.   Abdominal:      General: Bowel sounds are normal. There is no distension.      Palpations: Abdomen is soft.      Tenderness: There is no abdominal tenderness.      Comments: Multiple surgical scars  Ileostomy present in the RLQ, dark/brown liquid output  NETTA drain in place, scant output   Musculoskeletal:         General: No tenderness. Normal range of motion.      Cervical back: Normal range of motion and neck supple.   Skin:     General: Skin is warm and dry.      Findings: No rash.   Neurological:      Mental Status: He is alert and oriented to person, place, and time.      Cranial Nerves: No cranial nerve deficit.         Fluids    Intake/Output Summary (Last 24 hours) at 3/26/2022 1210  Last data filed at 3/26/2022 0400  Gross per 24 hour   Intake 460 ml   Output 5 ml   Net 455 ml       Laboratory  Recent Labs     03/24/22  0059 03/25/22  0255 03/26/22  0612   WBC 6.2 5.8 5.9   RBC 4.27* 4.54* 4.57*   HEMOGLOBIN 13.3* 14.0 14.4   HEMATOCRIT 38.3* 40.9* 41.5*   MCV 89.7 90.1 90.8   MCH 31.1 30.8 31.5   MCHC 34.7 34.2 34.7   RDW 42.6 43.0 43.1   PLATELETCT 327 359 382   MPV 8.9* 9.0 9.2     Recent Labs     03/24/22  0059 03/25/22  0255 03/26/22  0612   SODIUM 140 135 136   POTASSIUM 3.5* 3.2* 3.6   CHLORIDE 101 100 102   CO2 27 24 22   GLUCOSE 97 99 90   BUN 5* 8 9   CREATININE 0.84 0.78 1.00   CALCIUM 9.0 8.8 8.7                   Imaging  CT-ABDOMEN-PELVIS WITH   Final Result         1. No contrast is seen within the presacral fluid collection to suggest  fistulous connection.      CT-ABDOMEN&PELVIS ENTEROGRAPHY   Final Result      1.  Unchanged presacral/precoccygeal space fluid collection measuring 2.9 x 2.2 cm consistent with abscess      2.  Prior colectomy and there is a right lower quadrant ostomy      3.  No direct evidence for fistula although cannot be excluded with the negative contrast administered for enterography. Consider repeat CT scan with standard oral contrast when clinically appropriate      4.  Postoperative changes in the presacral/precoccygeal space      5.  Mass contiguous with the inferior aspect of rectal stump which could be tumor versus enlarged prostate.      6.  No other significant finding      CT-DRAIN-PERITONEAL   Final Result      1.  CT GUIDED RETROPERITONEAL PELVIC/PRESACRAL CATHETER DRAINAGE. INITIAL FLUID RETURN RESEMBLED ENTERIC CONTENTS. A FISTULOUS LEAK MAY BE PRESENT. LIKEWISE, THE AIR CONTENT SUGGESTS THE POSSIBILITY OF COMMUNICATION WITH BOWEL.   2.  THE CURRENT PLAN IS TO MONITOR DRAINAGE OUTPUT, IRRIGATE DAILY AND OBTAIN A FOLLOWUP CT SCAN IN 5-7 DAYS IF CLINICALLY INDICATED.   3. ABSCESSOGRAM MAY BE OF INTEREST PRIOR TO CATHETER REMOVAL TO DETERMINE IF THERE IS FISTULOUS COMMUNICATION WITH SMALL BOWEL IN THE PELVIS.      OUTSIDE IMAGES-CT ABDOMEN /PELVIS   Final Result           Assessment/Plan  * Small bowel obstruction (HCC)- (present on admission)  Assessment & Plan  Resolved  Good ostomy output  Tolerating regular diet    Rectal mass- (present on admission)  Assessment & Plan  Incidentally discovered on CT scan  Patient has an end ileostomy, does not describe any rectal pain or BRBPR  Possible enlarged prostate on differential, possible associated LUTS, consider outpatient Alpha-2 blocker  Check CEA which is normal  Check PSA with reflex to free PSA, awaiting results  Outpatient work up    Tobacco chew use- (present on admission)  Assessment & Plan  Recommend cessation    Abdominal fluid collection- (present on  admission)  Assessment & Plan  Noted on outside hospital CT, clincally stable  Per outside CT abd: 3.7 by 3.9 cm fluid collection posterior to bladder which is an increase from prior imaging study on 3/10/2022  IR drain placed 3/22, possible fistulous communication with bowel, but likely chronic  Initially did not appear infected, but now intra operative fluid cultures c/w at this time with pan-sensitive E coli  Started IV zosyn 3.25  CT enterography shows no clear e/o fistula (surgery thinks there is one) but does show a 3 cm fluid collection (so improving by 1 cm compared to outside scan)  Critical to determine if fistula versus abscess, since treatment will be dramatically different  CT A/P Oral/IV contrast repeat on 3/25 shows no clear fistulous connection  ID, GS, IR following  Repeat SCAN on 3/28, if improved and output remains low, remove drain and likely a course of oral abx's on discharge    Leukocytosis- (present on admission)  Assessment & Plan  Resolved initially withoiut abx's  Now appears on imaging with possible intra-abdominal abscess versus fistula versus both  See below  Trend WBC/fever curve    Hypokalemia- (present on admission)  Assessment & Plan  Normalized, replacement, daily labs, from high output ileostomy       VTE prophylaxis: lovenox    I have performed a physical exam and reviewed and updated ROS and Plan today (3/26/2022). In review of yesterday's note (3/25/2022), there are no changes except as documented above.

## 2022-03-26 NOTE — CARE PLAN
The patient is Stable - Low risk of patient condition declining or worsening    Shift Goals  Clinical Goals:   Patient Goals: pain management, sleep    Progress made toward(s) clinical / shift goals:        Problem: Knowledge Deficit - Standard  Goal: Patient and family/care givers will demonstrate understanding of plan of care, disease process/condition, diagnostic tests and medications  Outcome: Progressing     Problem: Pain - Standard  Goal: Alleviation of pain or a reduction in pain to the patient’s comfort goal  Outcome: Progressing    Patient is able to communicate needs effectively. Uses call light appropriately. Updated patient on plan of care and medications. Fall precautions in place. All patient's questions answered at this time. Call light in reach.

## 2022-03-26 NOTE — PROGRESS NOTES
Spoke to CT regarding time for scan. Per CT, pt to go down tonight for CT. Pt to be NPO prior to scan. Informed CT tech that pt had taken some water with meds and been snacking up until 2000. Per CT that will be okay. Informed pt of plan and NPO status. Pt verbalized understanding. Oral contrast brought up and pt educated as to protocol. Consent for contrast signed.

## 2022-03-26 NOTE — PROGRESS NOTES
Infectious Disease Progress Note    Author: Hilaria Ho M.D. Date & Time of service: 3/26/2022  10:53 AM    Chief Complaint:  abd abscess  Probable fistula      Interval History:  51 y.o. male with h/o recurrent colon cancer admitted 3/21/2022 as transfer from [place] for N/V due to SBO. Found to have presacral abscess and probable fistula  3/25 AF WBC 5.8 tolerating some PO No immediate plan for surgery  3/26 AF WBC 5.9 tolerating diet well-feeling much better. Denies SE abx    Labs Reviewed, Medications Reviewed and Wound Reviewed.    Review of Systems:  Review of Systems   Constitutional: Negative for fever.   Respiratory: Negative for cough.    Cardiovascular: Negative for chest pain.   Gastrointestinal: Negative for nausea and vomiting.        Mild abd pain   All other systems reviewed and are negative.      Hemodynamics:  Temp (24hrs), Av.7 °C (98.1 °F), Min:36.4 °C (97.6 °F), Max:36.9 °C (98.4 °F)  Temperature: 36.6 °C (97.9 °F)  Pulse  Av.8  Min: 53  Max: 112   Blood Pressure: 119/76       Physical Exam:  Physical Exam  Vitals and nursing note reviewed.   Constitutional:       General: He is not in acute distress.     Appearance: He is not ill-appearing, toxic-appearing or diaphoretic.   HENT:      Nose: No rhinorrhea.      Mouth/Throat:      Pharynx: No oropharyngeal exudate or posterior oropharyngeal erythema.   Eyes:      General: No scleral icterus.     Extraocular Movements: Extraocular movements intact.      Pupils: Pupils are equal, round, and reactive to light.   Cardiovascular:      Rate and Rhythm: Normal rate.   Pulmonary:      Effort: Pulmonary effort is normal. No respiratory distress.   Abdominal:      General: There is no distension.      Tenderness: There is abdominal tenderness. There is no guarding or rebound.      Comments: Drain  Ostomy  Well healed midline scar   Musculoskeletal:      Cervical back: Neck supple. No rigidity.   Skin:     Coloration: Skin is pale. Skin  is not jaundiced.      Findings: Bruising present.   Neurological:      General: No focal deficit present.      Mental Status: He is alert and oriented to person, place, and time.   Psychiatric:         Mood and Affect: Mood normal.         Behavior: Behavior normal.         Meds:    Current Facility-Administered Medications:   •  potassium chloride SA  •  [COMPLETED] piperacillin-tazobactam **AND** piperacillin-tazobactam  •  HYDROcodone-acetaminophen  •  diphenhydrAMINE  •  morphine injection  •  senna-docusate **AND** polyethylene glycol/lytes **AND** magnesium hydroxide **AND** bisacodyl  •  enoxaparin (LOVENOX) injection  •  acetaminophen  •  Notify provider if pain remains uncontrolled **AND** Use the Numeric Rating Scale (NRS), Ervin-Baker Faces (WBF), or FLACC on regular floors and Critical-Care Pain Observation Tool (CPOT) on ICUs/Trauma to assess pain **AND** Pulse Ox **AND** Pharmacy Consult Request **AND** If patient difficult to arouse and/or has respiratory depression (respiratory rate of 10 or less), stop any opiates that are currently infusing and call a Rapid Response.  •  labetalol  •  ondansetron  •  ondansetron  •  promethazine  •  promethazine  •  prochlorperazine    Labs:  Recent Labs     03/24/22 0059 03/25/22 0255 03/26/22 0612   WBC 6.2 5.8 5.9   RBC 4.27* 4.54* 4.57*   HEMOGLOBIN 13.3* 14.0 14.4   HEMATOCRIT 38.3* 40.9* 41.5*   MCV 89.7 90.1 90.8   MCH 31.1 30.8 31.5   RDW 42.6 43.0 43.1   PLATELETCT 327 359 382   MPV 8.9* 9.0 9.2   NEUTSPOLYS 65.50 56.90 54.30   LYMPHOCYTES 25.20 34.10 34.80   MONOCYTES 7.40 6.70 7.20   EOSINOPHILS 1.10 1.50 2.00   BASOPHILS 0.50 0.50 1.20     Recent Labs     03/24/22 0059 03/25/22 0255 03/26/22 0612   SODIUM 140 135 136   POTASSIUM 3.5* 3.2* 3.6   CHLORIDE 101 100 102   CO2 27 24 22   GLUCOSE 97 99 90   BUN 5* 8 9     Recent Labs     03/24/22  0059 03/25/22  0255 03/26/22  0612   CREATININE 0.84 0.78 1.00       Imaging:  CT-DRAIN-PERITONEAL    Result  Date: 3/22/2022  3/22/2022 4:31 PM HISTORY/REASON FOR EXAM:  Noted intra-abdominal fluid collection near bladder on outside CT scan. Presacral fluid collection. History of total colectomy. Right lower quadrant ileostomy. Intermittent small bowel obstruction. TECHNIQUE/EXAM DESCRIPTION: Pelvic (presacral/retroperitoneal) abscess drainage with CT guidance. Low dose optimization technique was utilized for this CT exam including automated exposure control and adjustment of the mA and/or kV according to patient size. PROCEDURE: Informed consent was obtained. Localizing CT images were obtained with the patient in prone position. The skin was prepped with ChloraPrep and draped in a sterile fashion. Moderate sedation was provided. Pulse oximetry and continuous cardiac monitoring by the nurse was performed throughout the exam. Intraservice time was 30 minutes. Following local anesthesia with 1% Lidocaine, a 17 G guiding needle was placed and needle path via a left transgluteal approach confirmed with CT. An Amplatz guidewire was placed and following serial tract dilatation, a 8 Macedonian pigtail locking catheter was placed. A specimen was collected and submitted for culture and sensitivity and Gram stain. Total of 10 mL initially butterscotch colored fluid turning to pink opaque fluid was drained. The initial fluid returned resembled enteric contents. The catheter was secured to the skin and connected to suction bulb drainage. Final CT images were obtained documenting catheter position. The patient tolerated the procedure well with no evidence of complication. COMPARISON: CT of the abdomen and pelvis outside films 3/20/2022 FINDINGS: Initial CT images again show an approximately 3.7 cm presacral collection with an air-fluid level. The final CT images show satisfactory catheter position within the targeted presacral collection.     1.  CT GUIDED RETROPERITONEAL PELVIC/PRESACRAL CATHETER DRAINAGE. INITIAL FLUID RETURN RESEMBLED  ENTERIC CONTENTS. A FISTULOUS LEAK MAY BE PRESENT. LIKEWISE, THE AIR CONTENT SUGGESTS THE POSSIBILITY OF COMMUNICATION WITH BOWEL. 2.  THE CURRENT PLAN IS TO MONITOR DRAINAGE OUTPUT, IRRIGATE DAILY AND OBTAIN A FOLLOWUP CT SCAN IN 5-7 DAYS IF CLINICALLY INDICATED. 3. ABSCESSOGRAM MAY BE OF INTEREST PRIOR TO CATHETER REMOVAL TO DETERMINE IF THERE IS FISTULOUS COMMUNICATION WITH SMALL BOWEL IN THE PELVIS.    CT-ABDOMEN&PELVIS ENTEROGRAPHY    Result Date: 3/25/2022  CT abdomen and pelvis with contrast 3/24/2022 1725 hours is HISTORY/REASON FOR EXAM:  Bowel obstruction suspected; Pelvic fluid collection - S/P IR drain placement.  Pt with Hx of total abdominal colectomy. possible small bowel fistula; Possible fistula to pelvic fluid collection TECHNIQUE/EXAM DESCRIPTION:  CT abdomen and pelvis with contrast, enterography protocol. Following oral administration of VoLumen oral contrast and water as well as intravenous administration of 100 mL of Omnipaque 350 nonionic contrast, thin section helical CT is performed from the level of the diaphragm through the ischial tuberosities. Axial, coronal, and sagittal images are sent to PACS. Low dose optimization technique was utilized for this CT exam including automated exposure control and adjustment of the mA and/or kV according to patient size. COMPARISON:  Outside CT abdomen and pelvis 3/20/2022 FINDINGS: The study was ordered and performed as CT enterography which utilizes negative oral contrast. This precludes assessment of a fistula. We called the patient's caregivers recommending that the scan be repeated and that is not possible at this time therefore, the scan is being interpreted as performed. LUNGS: The lung bases are clear. Left ventricle is probably dilated. Osseous structures:  No significant finding. Abdomen: LIVER: is normal in appearance. GALLBLADDER and BILIARY SYSTEM The gallbladder is normal in CT appearance. There is no biliary dilation. SPLEEN: Normal in  "appearance. PANCREAS: The pancreas is normal in appearance. The splenic vein and portal vein enhance normally. ADRENAL glands: Normal in appearance. RIGHT kidney: Normal in appearance. LEFT kidney: Normal in appearance. There is no hydronephrosis. BOWEL and MESENTERY: There is a right-sided ileostomy. There has been a colectomy. There is a presacral fluid collection containing a catheter. Fluid collection measures 2.9 x 2.2 cm consistent with an abscess and may indicate leakage. AORTA and VASCULATURE: Atherosclerosis without aneurysm. Pelvis: There is possibly a rectal mass versus prostate enlargement.     1.  Unchanged presacral/precoccygeal space fluid collection measuring 2.9 x 2.2 cm consistent with abscess 2.  Prior colectomy and there is a right lower quadrant ostomy 3.  No direct evidence for fistula although cannot be excluded with the negative contrast administered for enterography. Consider repeat CT scan with standard oral contrast when clinically appropriate 4.  Postoperative changes in the presacral/precoccygeal space 5.  Mass contiguous with the inferior aspect of rectal stump which could be tumor versus enlarged prostate. 6.  No other significant finding      Micro:  Results     Procedure Component Value Units Date/Time    BLOOD CULTURE [[id number]] Collected: 03/25/22 1603    Order Status: Completed Specimen: Blood from Peripheral Updated: 03/26/22 0721     Significant Indicator NEG     Source BLD     Site PERIPHERAL     Culture Result No Growth  Note: Blood cultures are incubated for 5 days and  are monitored continuously.Positive blood cultures  are called to the RN and reported as soon as  they are identified.      Narrative:      Per Hospital Policy: Only change Specimen Src: to \"Line\" if  specified by physician order.  Left Hand    BLOOD CULTURE [[id number]] Collected: 03/25/22 1448    Order Status: Completed Specimen: Blood from Peripheral Updated: 03/26/22 0721     Significant Indicator NEG     " "Source BLD     Site PERIPHERAL     Culture Result No Growth  Note: Blood cultures are incubated for 5 days and  are monitored continuously.Positive blood cultures  are called to the RN and reported as soon as  they are identified.      Narrative:      Per Hospital Policy: Only change Specimen Src: to \"Line\" if  specified by physician order.  Left Hand    FLUID CULTURE W/GRAM STAIN [[id number]]  (Abnormal)  (Susceptibility) Collected: 03/22/22 1606    Order Status: Completed Specimen: Body Fluid Updated: 03/24/22 1252     Significant Indicator POS     Source BF     Site Peritoneal Fluid     Culture Result Light growth mixed skin andrei.     Gram Stain Result Moderate WBCs.  Few Gram negative rods.       Culture Result Escherichia coli  Light growth      Narrative:      order req#[id number]    Susceptibility     Escherichia coli (1)     Antibiotic Interpretation Microscan   Method Status    Ampicillin Sensitive <=8 mcg/mL PATTIE Final    Ceftriaxone Sensitive <=1 mcg/mL PATTIE Final    Cefazolin Sensitive <=2 mcg/mL PATTIE Final    Ciprofloxacin Sensitive <=0.25 mcg/mL PATTIE Final    Cefepime Sensitive <=2 mcg/mL PATTIE Final    Cefuroxime Sensitive <=4 mcg/mL PATTIE Final    Ampicillin/sulbactam Sensitive <=4/2 mcg/mL PATTIE Final    Ertapenem Sensitive <=0.5 mcg/mL PATTIE Final    Tobramycin Sensitive <=2 mcg/mL PATTIE Final    Gentamicin Sensitive <=2 mcg/mL PATTIE Final    Minocycline Sensitive <=4 mcg/mL PATTIE Final    Moxifloxacin Sensitive <=2 mcg/mL PATTIE Final    Pip/Tazobactam Sensitive <=8 mcg/mL PATTIE Final    Trimeth/Sulfa Sensitive <=0.5/9.5 mcg/mL PATTIE Final    Tigecycline Sensitive <=2 mcg/mL PATTIE Final                   GRAM STAIN [[id number]] Collected: 03/22/22 1606    Order Status: Completed Specimen: Body Fluid Updated: 03/23/22 0611     Significant Indicator .     Source BF     Site Peritoneal Fluid     Gram Stain Result Moderate WBCs.  Few Gram negative rods.      Narrative:      order req#[id number]    FLUID CULTURE W/GRAM STAIN " [[id number]]     Order Status: No result Specimen: Other Body Fluid           Assessment:  Active Hospital Problems    Diagnosis    • *Small bowel obstruction (HCC) [K56.609]    • Abdominal fluid collection [R18.8]    • Tobacco chew use [Z72.0]    • Leukocytosis [D72.829]    • Hypokalemia [E87.6]      Recurrent SBO  H/o recurrent colon cancer-rectal mass on CT  Probable fistula vs perforation  Intraabd abscess-Ecoli-not improved on CT 3/25  Leukocytosis resolved        PLAN:   Continue Zosyn   Repeat CT 3/25 : Unchanged presacral/precoccygeal space fluid collection measuring 2.9 x 2.2 cm consistent with abscess 2.  Prior colectomy and there is a right lower quadrant ostomy 3.  No direct evidence for fistula although cannot be excluded with the negative contrast administered for enterography. Consider repeat CT scan with standard oral contrast when clinically appropriate 4.  Postoperative changes in the presacral/precoccygeal space 5.  Mass contiguous with the inferior aspect of rectal stump which could be tumor versus enlarged prostate  Need for larger drain per IR. No current plan for surgery  If fistula present will require weeks of antibiotics + drain to promote healing. Will not need IV if no fistula-please clarify. Another CT planned 3/28  Recommend PICC if fistula as will require extended course IV antibiotics to promote spontaneous closure  Final antibiotic recommendations per culture results and clinical course  Further eval rectal stump mass per PCT

## 2022-03-26 NOTE — CARE PLAN
The patient is Stable - Low risk of patient condition declining or worsening    Shift Goals  Clinical Goals: CT  Patient Goals: pain management, sleep    Progress made toward(s) clinical / shift goals:  Pt received CT. Pt tolerated contrast. \    Problem: Knowledge Deficit - Standard  Goal: Patient and family/care givers will demonstrate understanding of plan of care, disease process/condition, diagnostic tests and medications  Outcome: Progressing     Problem: Pain - Standard  Goal: Alleviation of pain or a reduction in pain to the patient’s comfort goal  Outcome: Progressing     Problem: Communication  Goal: The ability to communicate needs accurately and effectively will improve  Outcome: Progressing       Patient is not progressing towards the following goals:

## 2022-03-26 NOTE — PROGRESS NOTES
Pt AO x 4  Vitals signs stable  Pt denies chest pain or SOB  O2 sat >90% on RA breathing unlabored  Pt endorses moderate abd pain, Norco given, pt reports improved relief of pain after switching to Norco.   Pt denies N/V/D  + voiding, + output in ileostomy   IR drain to L flank, scant output. Flushed per orders, pt tolerated.      Updated plan of care discussed with pt. Safety education done. Falls precautions in place.   Pt safety maintained. Hourly rounding done.

## 2022-03-26 NOTE — PROGRESS NOTES
Received report from previous shift RN.  Assessment complete.  Patient A&O x 4. Patient calls appropriately.  Patient ambulates independently . Bed alarm off.  Patient has 8/10 pain. Pain managed with prescribed medications, rest, repositioning, and heat pad.  Denies N&V. Tolerating regular diet.    + void, + flatus, RLQ ostomy with output.  Patient on RA,  denies SOB.     Reviewed plan of care with patient. Call light and personal belongings within reach. Hourly rounding in place. All needs met at this time.

## 2022-03-27 LAB
BASOPHILS # BLD AUTO: 0.9 % (ref 0–1.8)
BASOPHILS # BLD: 0.05 K/UL (ref 0–0.12)
EOSINOPHIL # BLD AUTO: 0.15 K/UL (ref 0–0.51)
EOSINOPHIL NFR BLD: 2.7 % (ref 0–6.9)
ERYTHROCYTE [DISTWIDTH] IN BLOOD BY AUTOMATED COUNT: 44.2 FL (ref 35.9–50)
HCT VFR BLD AUTO: 40.3 % (ref 42–52)
HGB BLD-MCNC: 13.7 G/DL (ref 14–18)
IMM GRANULOCYTES # BLD AUTO: 0.02 K/UL (ref 0–0.11)
IMM GRANULOCYTES NFR BLD AUTO: 0.4 % (ref 0–0.9)
LYMPHOCYTES # BLD AUTO: 2.13 K/UL (ref 1–4.8)
LYMPHOCYTES NFR BLD: 38 % (ref 22–41)
MCH RBC QN AUTO: 31.6 PG (ref 27–33)
MCHC RBC AUTO-ENTMCNC: 34 G/DL (ref 33.7–35.3)
MCV RBC AUTO: 92.9 FL (ref 81.4–97.8)
MONOCYTES # BLD AUTO: 0.4 K/UL (ref 0–0.85)
MONOCYTES NFR BLD AUTO: 7.1 % (ref 0–13.4)
NEUTROPHILS # BLD AUTO: 2.85 K/UL (ref 1.82–7.42)
NEUTROPHILS NFR BLD: 50.9 % (ref 44–72)
NRBC # BLD AUTO: 0 K/UL
NRBC BLD-RTO: 0 /100 WBC
PLATELET # BLD AUTO: 353 K/UL (ref 164–446)
PMV BLD AUTO: 9 FL (ref 9–12.9)
RBC # BLD AUTO: 4.34 M/UL (ref 4.7–6.1)
WBC # BLD AUTO: 5.6 K/UL (ref 4.8–10.8)

## 2022-03-27 PROCEDURE — 700105 HCHG RX REV CODE 258: Performed by: INTERNAL MEDICINE

## 2022-03-27 PROCEDURE — 700102 HCHG RX REV CODE 250 W/ 637 OVERRIDE(OP): Performed by: HOSPITALIST

## 2022-03-27 PROCEDURE — 99232 SBSQ HOSP IP/OBS MODERATE 35: CPT | Performed by: SURGERY

## 2022-03-27 PROCEDURE — 700111 HCHG RX REV CODE 636 W/ 250 OVERRIDE (IP): Performed by: HOSPITALIST

## 2022-03-27 PROCEDURE — A9270 NON-COVERED ITEM OR SERVICE: HCPCS | Performed by: HOSPITALIST

## 2022-03-27 PROCEDURE — 85025 COMPLETE CBC W/AUTO DIFF WBC: CPT

## 2022-03-27 PROCEDURE — 36415 COLL VENOUS BLD VENIPUNCTURE: CPT

## 2022-03-27 PROCEDURE — 99232 SBSQ HOSP IP/OBS MODERATE 35: CPT | Performed by: INTERNAL MEDICINE

## 2022-03-27 PROCEDURE — 770001 HCHG ROOM/CARE - MED/SURG/GYN PRIV*

## 2022-03-27 PROCEDURE — 700111 HCHG RX REV CODE 636 W/ 250 OVERRIDE (IP): Performed by: INTERNAL MEDICINE

## 2022-03-27 RX ADMIN — SENNOSIDES AND DOCUSATE SODIUM 2 TABLET: 50; 8.6 TABLET ORAL at 04:37

## 2022-03-27 RX ADMIN — DIPHENHYDRAMINE HYDROCHLORIDE 25 MG: 25 TABLET ORAL at 01:45

## 2022-03-27 RX ADMIN — MORPHINE SULFATE 2 MG: 4 INJECTION INTRAVENOUS at 21:16

## 2022-03-27 RX ADMIN — PIPERACILLIN AND TAZOBACTAM 3.38 G: 3; .375 INJECTION, POWDER, LYOPHILIZED, FOR SOLUTION INTRAVENOUS; PARENTERAL at 04:37

## 2022-03-27 RX ADMIN — HYDROCODONE BITARTRATE AND ACETAMINOPHEN 1 TABLET: 5; 325 TABLET ORAL at 01:45

## 2022-03-27 RX ADMIN — PIPERACILLIN AND TAZOBACTAM 3.38 G: 3; .375 INJECTION, POWDER, LYOPHILIZED, FOR SOLUTION INTRAVENOUS; PARENTERAL at 21:15

## 2022-03-27 RX ADMIN — ENOXAPARIN SODIUM 40 MG: 40 INJECTION SUBCUTANEOUS at 15:09

## 2022-03-27 RX ADMIN — HYDROCODONE BITARTRATE AND ACETAMINOPHEN 1 TABLET: 5; 325 TABLET ORAL at 11:03

## 2022-03-27 RX ADMIN — HYDROCODONE BITARTRATE AND ACETAMINOPHEN 1 TABLET: 5; 325 TABLET ORAL at 18:55

## 2022-03-27 RX ADMIN — PIPERACILLIN AND TAZOBACTAM 3.38 G: 3; .375 INJECTION, POWDER, LYOPHILIZED, FOR SOLUTION INTRAVENOUS; PARENTERAL at 12:27

## 2022-03-27 ASSESSMENT — ENCOUNTER SYMPTOMS
ROS GI COMMENTS: REMAINS ABOUT THE SAME TODAY
PALPITATIONS: 0
MYALGIAS: 0
ABDOMINAL PAIN: 0
CHILLS: 0
NAUSEA: 0
VOMITING: 0
DIARRHEA: 1
FEVER: 0

## 2022-03-27 ASSESSMENT — PAIN DESCRIPTION - PAIN TYPE
TYPE: ACUTE PAIN

## 2022-03-27 NOTE — CARE PLAN
Problem: Knowledge Deficit - Standard  Goal: Patient and family/care givers will demonstrate understanding of plan of care, disease process/condition, diagnostic tests and medications  Outcome: Progressing     Problem: Pain - Standard  Goal: Alleviation of pain or a reduction in pain to the patient’s comfort goal  Outcome: Progressing     The patient is Stable - Low risk of patient condition declining or worsening    Shift Goals  Clinical Goals: pain management, oob activity  Patient Goals: pain management, rest    Progress made toward(s) clinical / shift goals:  pain managed per MAR. Patient ambulating in room/halls. Patient able to rest between periods of activity.     Patient is not progressing towards the following goals:

## 2022-03-27 NOTE — PROGRESS NOTES
Assumed care of patient at 0645. Bedside report received. Assessment complete.  AA&Ox4. Denies CP/SOB.  Reporting pain controlled per MAR.   Educated patient regarding pharmacologic and non pharmacologic modalities for pain management.  Skin per flow sheets.  IR drain noted, flushed per orders  Tolerating diet. Denies N/V.  + void. + ostomy output  Pt ambulates independently.  Plan of care discussed, all questions answered. Educated on the importance of calling before getting OOB and pt verbalizes understanding. Educated regarding importance of oral care. Call light is within reach, treaded slipper socks on, bed in lowest/ locked position, hourly rounding in place, all needs met at this time.

## 2022-03-27 NOTE — PROGRESS NOTES
"    DATE: 3/27/2022    Hospital Day 6 sbo.and pelvic fluid collection      INTERVAL EVENTS:  Patient with no complaints  Drain with bilious appearing fluid in NETTA drain. Still minimal drainage  Tolerating reg diet.    PHYSICAL EXAMINATION:  Vital Signs: BP (!) 96/66   Pulse 61   Temp 36 °C (96.8 °F) (Temporal)   Resp 16   Ht 1.88 m (6' 2.02\")   Wt 88.8 kg (195 lb 12.3 oz)   SpO2 95%     Physical Exam  Vitals and nursing note reviewed.   Constitutional:       General: He is not in acute distress.     Appearance: He is well-developed. He is not ill-appearing.   HENT:      Nose: Nose normal.      Mouth/Throat:      Mouth: Mucous membranes are moist.      Pharynx: No oropharyngeal exudate.   Eyes:      General: No scleral icterus.  Neck:      Thyroid: No thyromegaly.   Cardiovascular:      Rate and Rhythm: Normal rate and regular rhythm.      Heart sounds: Normal heart sounds. No murmur heard.  Pulmonary:      Effort: Pulmonary effort is normal. No respiratory distress.      Breath sounds: Normal breath sounds. No wheezing.   Abdominal:      General: Bowel sounds are normal. There is no distension.      Palpations: Abdomen is soft.      Tenderness: There is no abdominal tenderness. There is no guarding or rebound.      Comments: Multiple surgical scars  Ileostomy present in the RLQ, dark/brown liquid output  NETTA drain in place with enteric drainage in the bulb itself but thick brown material noted in drain tube from a skin   Musculoskeletal:         General: No tenderness. Normal range of motion.      Cervical back: Normal range of motion and neck supple.   Skin:     General: Skin is warm and dry.      Findings: No rash.   Neurological:      Mental Status: He is alert and oriented to person, place, and time.      Cranial Nerves: No cranial nerve deficit.           LABORATORY VALUES:   Recent Labs     03/25/22  0255 03/26/22  0612 03/27/22  0312   WBC 5.8 5.9 5.6   RBC 4.54* 4.57* 4.34*   HEMOGLOBIN 14.0 14.4 13.7* "   HEMATOCRIT 40.9* 41.5* 40.3*   MCV 90.1 90.8 92.9   MCH 30.8 31.5 31.6   MCHC 34.2 34.7 34.0   RDW 43.0 43.1 44.2   PLATELETCT 359 382 353   MPV 9.0 9.2 9.0     Recent Labs     03/25/22  0255 03/26/22  0612   SODIUM 135 136   POTASSIUM 3.2* 3.6   CHLORIDE 100 102   CO2 24 22   GLUCOSE 99 90   BUN 8 9   CREATININE 0.78 1.00   CALCIUM 8.8 8.7                IMAGING:   CT-ABDOMEN-PELVIS WITH   Final Result         1. No contrast is seen within the presacral fluid collection to suggest fistulous connection.      CT-ABDOMEN&PELVIS ENTEROGRAPHY   Final Result      1.  Unchanged presacral/precoccygeal space fluid collection measuring 2.9 x 2.2 cm consistent with abscess      2.  Prior colectomy and there is a right lower quadrant ostomy      3.  No direct evidence for fistula although cannot be excluded with the negative contrast administered for enterography. Consider repeat CT scan with standard oral contrast when clinically appropriate      4.  Postoperative changes in the presacral/precoccygeal space      5.  Mass contiguous with the inferior aspect of rectal stump which could be tumor versus enlarged prostate.      6.  No other significant finding      CT-DRAIN-PERITONEAL   Final Result      1.  CT GUIDED RETROPERITONEAL PELVIC/PRESACRAL CATHETER DRAINAGE. INITIAL FLUID RETURN RESEMBLED ENTERIC CONTENTS. A FISTULOUS LEAK MAY BE PRESENT. LIKEWISE, THE AIR CONTENT SUGGESTS THE POSSIBILITY OF COMMUNICATION WITH BOWEL.   2.  THE CURRENT PLAN IS TO MONITOR DRAINAGE OUTPUT, IRRIGATE DAILY AND OBTAIN A FOLLOWUP CT SCAN IN 5-7 DAYS IF CLINICALLY INDICATED.   3. ABSCESSOGRAM MAY BE OF INTEREST PRIOR TO CATHETER REMOVAL TO DETERMINE IF THERE IS FISTULOUS COMMUNICATION WITH SMALL BOWEL IN THE PELVIS.      OUTSIDE IMAGES-CT ABDOMEN /PELVIS   Final Result          ASSESSMENT AND PLAN:   * Small bowel obstruction (HCC)- (present on admission)  Assessment & Plan  Medical management   NG for any N/V  Ice chips   3/23 Advance diet  as tolerated        Abdominal fluid collection- (present on admission)  Assessment & Plan  Fluid collection in pelvis  IR drain placed 3/22  Bilious drainage  3/24 CT to evaluate for fistula- likely fistula   3/25 NETTA output decreasing  3/27 NETTA coming down  Plan repeat CT 3/28    IF CT shows fluid collection drained, consider PO abx and DC with NETTA and follow up with colorectal (Jose).       ____________________________________     Edda Parker M.D.    DD: 3/26/2022  9:48 AM

## 2022-03-27 NOTE — CARE PLAN
The patient is Stable - Low risk of patient condition declining or worsening    Shift Goals  Clinical Goals: Pain management  Patient Goals: pain management, sleep    Progress made toward(s) clinical / shift goals:  Pt endorsed pain mangement with NORCO and morphine.       Problem: Knowledge Deficit - Standard  Goal: Patient and family/care givers will demonstrate understanding of plan of care, disease process/condition, diagnostic tests and medications  Outcome: Progressing     Problem: Pain - Standard  Goal: Alleviation of pain or a reduction in pain to the patient’s comfort goal  Outcome: Progressing     Problem: Hemodynamics  Goal: Patient's hemodynamics, fluid balance and neurologic status will be stable or improve  Outcome: Progressing     Problem: Respiratory  Goal: Patient will achieve/maintain optimum respiratory ventilation and gas exchange  Outcome: Progressing       Patient is not progressing towards the following goals:

## 2022-03-27 NOTE — PROGRESS NOTES
Pt AO x 4  Vitals signs stable  Pt denies chest pain or SOB  O2 sat >90% on RA breathing unlabored  Pt endorses moderate abd pain, PRNs given as ordered.    Pt denies N/V/D  + voiding, + output in ileostomy   IR drain to L flank, scant output. Flushed per orders, pt tolerated.      Updated plan of care discussed with pt. Safety education done. Falls precautions in place.   Pt safety maintained. Hourly rounding done.

## 2022-03-27 NOTE — PROGRESS NOTES
[facility] Medicine Daily Progress Note    Date of Service  3/27/2022    Chief Complaint  Beau Mcintyre is a 51 y.o. male admitted 3/21/2022 with h/o colorectal cancer and multiple surgeries with end ileostomy and recurrent SBO's presents from outside hospital with recurrent SBO and possible posterior bladder intra abdominal fluid collection    Hospital Course  No notes on file    Interval Problem Update  Intra-abdominal fluid collection-minimal drainage from NETTA drain. Remains with minimal pain and tolerating regular diet without issue. No other new issues.       I have personally seen and examined the patient at bedside. I discussed the plan of care with patient.    Consultants/Specialty  general surgery    Code Status  Full Code    Disposition  Patient is not medically cleared for discharge.   Anticipate discharge to to home with close outpatient follow-up.  I have placed the appropriate orders for post-discharge needs.    Review of Systems  Review of Systems   Constitutional: Negative for chills and fever.   Cardiovascular: Negative for chest pain and palpitations.   Gastrointestinal: Positive for diarrhea (chronic and unchanged). Negative for abdominal pain, nausea and vomiting.        Remains about the same today   Musculoskeletal: Negative for joint pain and myalgias.   All other systems reviewed and are negative.       Physical Exam  Temp:  [36 °C (96.8 °F)-36.5 °C (97.7 °F)] 36.3 °C (97.3 °F)  Pulse:  [56-88] 88  Resp:  [16-18] 16  BP: ()/(61-68) 102/66  SpO2:  [90 %-96 %] 96 %    Physical Exam  Vitals and nursing note reviewed.   Constitutional:       General: He is not in acute distress.     Appearance: He is well-developed. He is ill-appearing (chronically).   HENT:      Head: Normocephalic and atraumatic.      Mouth/Throat:      Mouth: Mucous membranes are dry.      Pharynx: No oropharyngeal exudate.   Eyes:      General: No scleral icterus.     Pupils: Pupils are equal, round, and reactive to  light.   Neck:      Thyroid: No thyromegaly.   Cardiovascular:      Rate and Rhythm: Normal rate and regular rhythm.      Heart sounds: Normal heart sounds. No murmur heard.  Pulmonary:      Effort: Pulmonary effort is normal. No respiratory distress.      Breath sounds: Normal breath sounds. No wheezing.   Abdominal:      General: Bowel sounds are normal. There is no distension.      Palpations: Abdomen is soft.      Tenderness: There is no abdominal tenderness.      Comments: Multiple surgical scars  Ileostomy present in the RLQ, dark/brown liquid output  NETTA drain in place, scant output  No interval changes noted today   Musculoskeletal:         General: No tenderness. Normal range of motion.      Cervical back: Normal range of motion and neck supple.   Skin:     General: Skin is warm and dry.      Findings: No rash.   Neurological:      Mental Status: He is alert and oriented to person, place, and time.      Cranial Nerves: No cranial nerve deficit.         Fluids    Intake/Output Summary (Last 24 hours) at 3/27/2022 1305  Last data filed at 3/27/2022 0837  Gross per 24 hour   Intake 1100 ml   Output 610 ml   Net 490 ml       Laboratory  Recent Labs     03/25/22  0255 03/26/22  0612 03/27/22  0312   WBC 5.8 5.9 5.6   RBC 4.54* 4.57* 4.34*   HEMOGLOBIN 14.0 14.4 13.7*   HEMATOCRIT 40.9* 41.5* 40.3*   MCV 90.1 90.8 92.9   MCH 30.8 31.5 31.6   MCHC 34.2 34.7 34.0   RDW 43.0 43.1 44.2   PLATELETCT 359 382 353   MPV 9.0 9.2 9.0     Recent Labs     03/25/22  0255 03/26/22  0612   SODIUM 135 136   POTASSIUM 3.2* 3.6   CHLORIDE 100 102   CO2 24 22   GLUCOSE 99 90   BUN 8 9   CREATININE 0.78 1.00   CALCIUM 8.8 8.7                   Imaging  CT-ABDOMEN-PELVIS WITH   Final Result         1. No contrast is seen within the presacral fluid collection to suggest fistulous connection.      CT-ABDOMEN&PELVIS ENTEROGRAPHY   Final Result      1.  Unchanged presacral/precoccygeal space fluid collection measuring 2.9 x 2.2 cm  consistent with abscess      2.  Prior colectomy and there is a right lower quadrant ostomy      3.  No direct evidence for fistula although cannot be excluded with the negative contrast administered for enterography. Consider repeat CT scan with standard oral contrast when clinically appropriate      4.  Postoperative changes in the presacral/precoccygeal space      5.  Mass contiguous with the inferior aspect of rectal stump which could be tumor versus enlarged prostate.      6.  No other significant finding      CT-DRAIN-PERITONEAL   Final Result      1.  CT GUIDED RETROPERITONEAL PELVIC/PRESACRAL CATHETER DRAINAGE. INITIAL FLUID RETURN RESEMBLED ENTERIC CONTENTS. A FISTULOUS LEAK MAY BE PRESENT. LIKEWISE, THE AIR CONTENT SUGGESTS THE POSSIBILITY OF COMMUNICATION WITH BOWEL.   2.  THE CURRENT PLAN IS TO MONITOR DRAINAGE OUTPUT, IRRIGATE DAILY AND OBTAIN A FOLLOWUP CT SCAN IN 5-7 DAYS IF CLINICALLY INDICATED.   3. ABSCESSOGRAM MAY BE OF INTEREST PRIOR TO CATHETER REMOVAL TO DETERMINE IF THERE IS FISTULOUS COMMUNICATION WITH SMALL BOWEL IN THE PELVIS.      OUTSIDE IMAGES-CT ABDOMEN /PELVIS   Final Result           Assessment/Plan  * Small bowel obstruction (HCC)- (present on admission)  Assessment & Plan  Resolved  Good ostomy output  Tolerating regular diet    Rectal mass- (present on admission)  Assessment & Plan  Incidentally discovered on CT scan  Patient has an end ileostomy, does not describe any rectal pain or BRBPR  Possible enlarged prostate on differential, possible associated LUTS, consider outpatient Alpha-2 blocker  Check CEA which is normal  Check PSA with reflex to free PSA, awaiting results  Outpatient work up    Tobacco chew use- (present on admission)  Assessment & Plan  Recommend cessation    Abdominal fluid collection- (present on admission)  Assessment & Plan  Noted on outside hospital CT, clincally stable  Per outside CT abd: 3.7 by 3.9 cm fluid collection posterior to bladder which is an  increase from prior imaging study on 3/10/2022  IR drain placed 3/22, possible fistulous communication with bowel, but likely chronic  Initially did not appear infected, but now intra operative fluid cultures c/w at this time with pan-sensitive E coli  Started IV zosyn 3.25  CT enterography shows no clear e/o fistula (surgery thinks there is one) but does show a 3 cm fluid collection (so improving by 1 cm compared to outside scan)  CT A/P Oral/IV contrast repeat on 3/25 shows no clear fistulous connection  ID, GS, IR following  Repeat SCAN on 3/28, if improved and output remains low, remove drain and likely a course of oral abx's on discharge    Leukocytosis- (present on admission)  Assessment & Plan  Resolved initially withoiut abx's  Now appears on imaging with possible intra-abdominal abscess versus fistula versus both  See below  Trend WBC/fever curve    Hypokalemia- (present on admission)  Assessment & Plan  Normalized, replacement, daily labs, from high output ileostomy       VTE prophylaxis: lovenox    I have performed a physical exam and reviewed and updated ROS and Plan today (3/27/2022). In review of yesterday's note (3/26/2022), there are no changes except as documented above.

## 2022-03-28 ENCOUNTER — APPOINTMENT (OUTPATIENT)
Dept: RADIOLOGY | Facility: MEDICAL CENTER | Age: 52
DRG: 372 | End: 2022-03-28
Attending: SURGERY
Payer: COMMERCIAL

## 2022-03-28 PROBLEM — K56.609 SMALL BOWEL OBSTRUCTION (HCC): Status: RESOLVED | Noted: 2019-07-07 | Resolved: 2022-03-28

## 2022-03-28 LAB
BASOPHILS # BLD AUTO: 1.3 % (ref 0–1.8)
BASOPHILS # BLD: 0.07 K/UL (ref 0–0.12)
EOSINOPHIL # BLD AUTO: 0.14 K/UL (ref 0–0.51)
EOSINOPHIL NFR BLD: 2.6 % (ref 0–6.9)
ERYTHROCYTE [DISTWIDTH] IN BLOOD BY AUTOMATED COUNT: 43.4 FL (ref 35.9–50)
HCT VFR BLD AUTO: 39.3 % (ref 42–52)
HGB BLD-MCNC: 13.4 G/DL (ref 14–18)
IMM GRANULOCYTES # BLD AUTO: 0.03 K/UL (ref 0–0.11)
IMM GRANULOCYTES NFR BLD AUTO: 0.6 % (ref 0–0.9)
LYMPHOCYTES # BLD AUTO: 2.22 K/UL (ref 1–4.8)
LYMPHOCYTES NFR BLD: 41.8 % (ref 22–41)
MCH RBC QN AUTO: 30.9 PG (ref 27–33)
MCHC RBC AUTO-ENTMCNC: 34.1 G/DL (ref 33.7–35.3)
MCV RBC AUTO: 90.8 FL (ref 81.4–97.8)
MONOCYTES # BLD AUTO: 0.4 K/UL (ref 0–0.85)
MONOCYTES NFR BLD AUTO: 7.5 % (ref 0–13.4)
NEUTROPHILS # BLD AUTO: 2.45 K/UL (ref 1.82–7.42)
NEUTROPHILS NFR BLD: 46.2 % (ref 44–72)
NRBC # BLD AUTO: 0 K/UL
NRBC BLD-RTO: 0 /100 WBC
PLATELET # BLD AUTO: 360 K/UL (ref 164–446)
PMV BLD AUTO: 9.1 FL (ref 9–12.9)
PSA FREE MFR SERPL: NORMAL %
PSA FREE SERPL-MCNC: NORMAL NG/ML
PSA SERPL-MCNC: 2.2 NG/ML (ref 0–4)
RBC # BLD AUTO: 4.33 M/UL (ref 4.7–6.1)
WBC # BLD AUTO: 5.3 K/UL (ref 4.8–10.8)

## 2022-03-28 PROCEDURE — A9270 NON-COVERED ITEM OR SERVICE: HCPCS | Performed by: HOSPITALIST

## 2022-03-28 PROCEDURE — 99232 SBSQ HOSP IP/OBS MODERATE 35: CPT | Mod: FS | Performed by: NURSE PRACTITIONER

## 2022-03-28 PROCEDURE — 700117 HCHG RX CONTRAST REV CODE 255: Performed by: SURGERY

## 2022-03-28 PROCEDURE — 99232 SBSQ HOSP IP/OBS MODERATE 35: CPT | Performed by: INTERNAL MEDICINE

## 2022-03-28 PROCEDURE — 700111 HCHG RX REV CODE 636 W/ 250 OVERRIDE (IP): Performed by: HOSPITALIST

## 2022-03-28 PROCEDURE — 72193 CT PELVIS W/DYE: CPT

## 2022-03-28 PROCEDURE — 770001 HCHG ROOM/CARE - MED/SURG/GYN PRIV*

## 2022-03-28 PROCEDURE — 700105 HCHG RX REV CODE 258: Performed by: INTERNAL MEDICINE

## 2022-03-28 PROCEDURE — 700102 HCHG RX REV CODE 250 W/ 637 OVERRIDE(OP): Performed by: HOSPITALIST

## 2022-03-28 PROCEDURE — 36415 COLL VENOUS BLD VENIPUNCTURE: CPT

## 2022-03-28 PROCEDURE — 85025 COMPLETE CBC W/AUTO DIFF WBC: CPT

## 2022-03-28 PROCEDURE — 700111 HCHG RX REV CODE 636 W/ 250 OVERRIDE (IP): Performed by: INTERNAL MEDICINE

## 2022-03-28 RX ADMIN — HYDROCODONE BITARTRATE AND ACETAMINOPHEN 1 TABLET: 5; 325 TABLET ORAL at 23:22

## 2022-03-28 RX ADMIN — HYDROCODONE BITARTRATE AND ACETAMINOPHEN 1 TABLET: 5; 325 TABLET ORAL at 01:49

## 2022-03-28 RX ADMIN — HYDROCODONE BITARTRATE AND ACETAMINOPHEN 1 TABLET: 5; 325 TABLET ORAL at 09:32

## 2022-03-28 RX ADMIN — MORPHINE SULFATE 2 MG: 4 INJECTION INTRAVENOUS at 06:15

## 2022-03-28 RX ADMIN — PIPERACILLIN AND TAZOBACTAM 3.38 G: 3; .375 INJECTION, POWDER, LYOPHILIZED, FOR SOLUTION INTRAVENOUS; PARENTERAL at 04:44

## 2022-03-28 RX ADMIN — PIPERACILLIN AND TAZOBACTAM 3.38 G: 3; .375 INJECTION, POWDER, LYOPHILIZED, FOR SOLUTION INTRAVENOUS; PARENTERAL at 20:18

## 2022-03-28 RX ADMIN — HYDROCODONE BITARTRATE AND ACETAMINOPHEN 1 TABLET: 5; 325 TABLET ORAL at 16:43

## 2022-03-28 RX ADMIN — ENOXAPARIN SODIUM 40 MG: 40 INJECTION SUBCUTANEOUS at 14:29

## 2022-03-28 RX ADMIN — MORPHINE SULFATE 2 MG: 4 INJECTION INTRAVENOUS at 20:18

## 2022-03-28 RX ADMIN — PIPERACILLIN AND TAZOBACTAM 3.38 G: 3; .375 INJECTION, POWDER, LYOPHILIZED, FOR SOLUTION INTRAVENOUS; PARENTERAL at 13:17

## 2022-03-28 RX ADMIN — IOHEXOL 25 ML: 240 INJECTION, SOLUTION INTRATHECAL; INTRAVASCULAR; INTRAVENOUS; ORAL at 12:00

## 2022-03-28 RX ADMIN — IOHEXOL 100 ML: 350 INJECTION, SOLUTION INTRAVENOUS at 12:00

## 2022-03-28 RX ADMIN — DIPHENHYDRAMINE HYDROCHLORIDE 25 MG: 25 TABLET ORAL at 01:49

## 2022-03-28 RX ADMIN — MORPHINE SULFATE 2 MG: 4 INJECTION INTRAVENOUS at 14:29

## 2022-03-28 ASSESSMENT — ENCOUNTER SYMPTOMS
CHILLS: 0
BLURRED VISION: 0
WEAKNESS: 0
BACK PAIN: 0
COUGH: 0
BRUISES/BLEEDS EASILY: 0
PALPITATIONS: 0
MYALGIAS: 0
ROS GI COMMENTS: COLOSTOMY
HEMOPTYSIS: 0
NAUSEA: 0
DIZZINESS: 0
HEADACHES: 0
DIARRHEA: 1
VOMITING: 0
ROS GI COMMENTS: REMAINS ABOUT THE SAME TODAY
FEVER: 0
ABDOMINAL PAIN: 0
SHORTNESS OF BREATH: 0

## 2022-03-28 ASSESSMENT — PAIN DESCRIPTION - PAIN TYPE
TYPE: ACUTE PAIN

## 2022-03-28 NOTE — CARE PLAN
The patient is Stable - Low risk of patient condition declining or worsening    Shift Goals  Clinical Goals: CT scan, pain control, mobility  Patient Goals: pain managment    Progress made toward(s) clinical / shift goals:    Problem: Pain - Standard  Goal: Alleviation of pain or a reduction in pain to the patient’s comfort goal  Outcome: Progressing  Pain controlled with oral pain meds     Problem: Psychosocial  Goal: Patient's level of anxiety will decrease  Outcome: Progressing  Pt encouraged to ask questions and verbalize concerns .       Patient is not progressing towards the following goals:

## 2022-03-28 NOTE — PROGRESS NOTES
"    DATE: 3/28/2022    Hospital Day 7 Small bowel obstruction and pelvic collection.    INTERVAL EVENTS:  Tolerating regular diet  WBC remains normal   Drain with minimal bilious output  Repeat CT pending    REVIEW OF SYSTEMS: Comprehensive review of systems is negative with the exception of the aforementioned HPI, PMH, and PSH bullets in accordance with CMS guidelines.    PHYSICAL EXAMINATION:      Constitutional:     Vital Signs: Blood Pressure (Abnormal) 94/58   Pulse (Abnormal) 47   Temperature 36.2 °C (97.2 °F) (Temporal)   Respiration 18   Height 1.88 m (6' 2.02\")   Weight 88.8 kg (195 lb 12.3 oz)   Oxygen Saturation 96%    General Appearance: The patient is a pleasant  and cooperative man in no critical distress.  HEENT:    No significant external craniofacial trauma. The pupils are equal, round, and reactive to light bilaterally.  Neck:    The trachea is midline.  Respiratory:   Inspection: Unlabored respirations, no intercostal retractions, paradoxical motion, or accessory muscle use.  Cardiovascular:   Inspection: The skin is warm and dry.  Auscultation: Regular rate and rhythm.  Abdomen:   Inspection: Abdominal inspection reveals ileostomy present. Posterior NETTA in place with bilious output.  Musculoskeletal:   Moves all extremities  Skin:    Examination of the skin reveals no significant abrasions, contusion, lacerations, or other soft tissue injury.  Neurologic:    Aden Coma Scale (GCS) GCS 15.    Neurologic examination reveals no focal deficits noted.  Psychiatric:   The patient does not appear depressed or anxious.    LABORATORY VALUES:   Recent Labs     03/26/22  0612 03/27/22  0312 03/28/22  0215   WBC 5.9 5.6 5.3   RBC 4.57* 4.34* 4.33*   HEMOGLOBIN 14.4 13.7* 13.4*   HEMATOCRIT 41.5* 40.3* 39.3*   MCV 90.8 92.9 90.8   MCH 31.5 31.6 30.9   MCHC 34.7 34.0 34.1   RDW 43.1 44.2 43.4   PLATELETCT 382 353 360   MPV 9.2 9.0 9.1     Recent Labs     03/26/22  0612   SODIUM 136   POTASSIUM 3.6 "   CHLORIDE 102   CO2 22   GLUCOSE 90   BUN 9   CREATININE 1.00   CALCIUM 8.7                IMAGING:   CT-ABDOMEN-PELVIS WITH   Final Result         1. No contrast is seen within the presacral fluid collection to suggest fistulous connection.      CT-ABDOMEN&PELVIS ENTEROGRAPHY   Final Result      1.  Unchanged presacral/precoccygeal space fluid collection measuring 2.9 x 2.2 cm consistent with abscess      2.  Prior colectomy and there is a right lower quadrant ostomy      3.  No direct evidence for fistula although cannot be excluded with the negative contrast administered for enterography. Consider repeat CT scan with standard oral contrast when clinically appropriate      4.  Postoperative changes in the presacral/precoccygeal space      5.  Mass contiguous with the inferior aspect of rectal stump which could be tumor versus enlarged prostate.      6.  No other significant finding      CT-DRAIN-PERITONEAL   Final Result      1.  CT GUIDED RETROPERITONEAL PELVIC/PRESACRAL CATHETER DRAINAGE. INITIAL FLUID RETURN RESEMBLED ENTERIC CONTENTS. A FISTULOUS LEAK MAY BE PRESENT. LIKEWISE, THE AIR CONTENT SUGGESTS THE POSSIBILITY OF COMMUNICATION WITH BOWEL.   2.  THE CURRENT PLAN IS TO MONITOR DRAINAGE OUTPUT, IRRIGATE DAILY AND OBTAIN A FOLLOWUP CT SCAN IN 5-7 DAYS IF CLINICALLY INDICATED.   3. ABSCESSOGRAM MAY BE OF INTEREST PRIOR TO CATHETER REMOVAL TO DETERMINE IF THERE IS FISTULOUS COMMUNICATION WITH SMALL BOWEL IN THE PELVIS.      OUTSIDE IMAGES-CT ABDOMEN /PELVIS   Final Result      CT-PELVIS WITH    (Results Pending)       ASSESSMENT AND PLAN:     Abdominal fluid collection- (present on admission)  Assessment & Plan  Fluid collection in pelvis  IR drain placed 3/22  Bilious drainage  3/24 CT to evaluate for fistula- likely fistula   3/25 NETTA output decreasing  3/27 NETTA output decreasing   3/28 Repeat CT      Repeat CT pending  Consider transition to PO antibiotics pending CT results       ____________________________________     NGHIA Soto    DD: 3/28/2022  10:24 AM

## 2022-03-28 NOTE — CARE PLAN
The patient is Stable - Low risk of patient condition declining or worsening    Shift Goals  Clinical Goals: pain management, oob activity  Patient Goals: pain management, rest    Progress made toward(s) clinical / shift goals:  Medicated for pain; see MAR. Patient up self to/from bathroom. Patient states painful to sit in chair/bed, prefers bed.     Patient is not progressing towards the following goals: NA.

## 2022-03-28 NOTE — PROGRESS NOTES
"Pt AA&Ox4. Pain rating at 6. Medicated per MAR  No c/o SOB,n/t. On RA.   Tolerating diet. Denies N/V  BM via ostomy. Pt self cares for ostomy  IR drain to Left buttock area. flushed per orders.   Pt ambulates independently with steady gait.  Plan for repeat CT today at 1130. Contrast at bedside for pt to drink.  Skin intact  Hourly rounding in place. Call light within reach.   BP (!) 94/58   Pulse (!) 47   Temp 36.2 °C (97.2 °F) (Temporal)   Resp 18   Ht 1.88 m (6' 2.02\")   Wt 88.8 kg (195 lb 12.3 oz)   SpO2 96%     "

## 2022-03-28 NOTE — PROGRESS NOTES
[facility] Medicine Daily Progress Note    Date of Service  3/28/2022    Chief Complaint  Beau Mcintyre is a 51 y.o. male admitted 3/21/2022 with h/o colorectal cancer and multiple surgeries with end ileostomy and recurrent SBO's presents from outside hospital with recurrent SBO and possible posterior bladder intra abdominal fluid collection    Hospital Course  No notes on file    Interval Problem Update  Intra-abdominal fluid collection-minimal drainage again. Repeat CT scan shows interval fluid collection is gone. Again, no clear e/o fistula connection. He feels good.       I have personally seen and examined the patient at bedside. I discussed the plan of care with patient.    Consultants/Specialty  general surgery    Code Status  Full Code    Disposition  Patient is not medically cleared for discharge.   Anticipate discharge to to home with close outpatient follow-up.  I have placed the appropriate orders for post-discharge needs.    Review of Systems  Review of Systems   Constitutional: Negative for chills and fever.   Cardiovascular: Negative for chest pain and palpitations.   Gastrointestinal: Positive for diarrhea (chronic and unchanged). Negative for abdominal pain, nausea and vomiting.        Remains about the same today   Musculoskeletal: Negative for joint pain and myalgias.   All other systems reviewed and are negative.   No changes again today    Physical Exam  Temp:  [36.2 °C (97.1 °F)-36.8 °C (98.2 °F)] 36.3 °C (97.3 °F)  Pulse:  [47-66] 62  Resp:  [16-18] 18  BP: ()/(53-67) 94/63  SpO2:  [94 %-96 %] 94 %    Physical Exam  Vitals and nursing note reviewed.   Constitutional:       General: He is not in acute distress.     Appearance: He is well-developed. He is ill-appearing (chronically).   HENT:      Head: Normocephalic and atraumatic.      Mouth/Throat:      Mouth: Mucous membranes are dry.      Pharynx: No oropharyngeal exudate.   Eyes:      General: No scleral icterus.     Pupils: Pupils are  equal, round, and reactive to light.   Neck:      Thyroid: No thyromegaly.   Cardiovascular:      Rate and Rhythm: Normal rate and regular rhythm.      Heart sounds: Normal heart sounds. No murmur heard.  Pulmonary:      Effort: Pulmonary effort is normal. No respiratory distress.      Breath sounds: Normal breath sounds. No wheezing.   Abdominal:      General: Bowel sounds are normal. There is no distension.      Palpations: Abdomen is soft.      Tenderness: There is no abdominal tenderness.      Comments: Multiple surgical scars  Ileostomy present in the RLQ, dark/brown liquid output  NETTA drain in place, scant output  No clear changes appreciated today   Musculoskeletal:         General: No tenderness. Normal range of motion.      Cervical back: Normal range of motion and neck supple.   Skin:     General: Skin is warm and dry.      Findings: No rash.   Neurological:      Mental Status: He is alert and oriented to person, place, and time.      Cranial Nerves: No cranial nerve deficit.         Fluids    Intake/Output Summary (Last 24 hours) at 3/28/2022 1332  Last data filed at 3/28/2022 0844  Gross per 24 hour   Intake 440 ml   Output 20 ml   Net 420 ml       Laboratory  Recent Labs     03/26/22  0612 03/27/22  0312 03/28/22  0215   WBC 5.9 5.6 5.3   RBC 4.57* 4.34* 4.33*   HEMOGLOBIN 14.4 13.7* 13.4*   HEMATOCRIT 41.5* 40.3* 39.3*   MCV 90.8 92.9 90.8   MCH 31.5 31.6 30.9   MCHC 34.7 34.0 34.1   RDW 43.1 44.2 43.4   PLATELETCT 382 353 360   MPV 9.2 9.0 9.1     Recent Labs     03/26/22  0612   SODIUM 136   POTASSIUM 3.6   CHLORIDE 102   CO2 22   GLUCOSE 90   BUN 9   CREATININE 1.00   CALCIUM 8.7                   Imaging  CT-PELVIS WITH   Final Result      1.  Right lower quadrant ileostomy.   2.  Left transgluteal posterior pelvic/presacral pigtail drainage catheter remains in place. No residual fluid collection evident about the pigtail loop.   3.  Enteric contrast opacifying pelvic small bowel loops. No  appreciable communication of contrast into the presacral collection.   4.  Catheter abscessogram may be of interest if there is strong clinical suspicion for a fistula.      CT-ABDOMEN-PELVIS WITH   Final Result         1. No contrast is seen within the presacral fluid collection to suggest fistulous connection.      CT-ABDOMEN&PELVIS ENTEROGRAPHY   Final Result      1.  Unchanged presacral/precoccygeal space fluid collection measuring 2.9 x 2.2 cm consistent with abscess      2.  Prior colectomy and there is a right lower quadrant ostomy      3.  No direct evidence for fistula although cannot be excluded with the negative contrast administered for enterography. Consider repeat CT scan with standard oral contrast when clinically appropriate      4.  Postoperative changes in the presacral/precoccygeal space      5.  Mass contiguous with the inferior aspect of rectal stump which could be tumor versus enlarged prostate.      6.  No other significant finding      CT-DRAIN-PERITONEAL   Final Result      1.  CT GUIDED RETROPERITONEAL PELVIC/PRESACRAL CATHETER DRAINAGE. INITIAL FLUID RETURN RESEMBLED ENTERIC CONTENTS. A FISTULOUS LEAK MAY BE PRESENT. LIKEWISE, THE AIR CONTENT SUGGESTS THE POSSIBILITY OF COMMUNICATION WITH BOWEL.   2.  THE CURRENT PLAN IS TO MONITOR DRAINAGE OUTPUT, IRRIGATE DAILY AND OBTAIN A FOLLOWUP CT SCAN IN 5-7 DAYS IF CLINICALLY INDICATED.   3. ABSCESSOGRAM MAY BE OF INTEREST PRIOR TO CATHETER REMOVAL TO DETERMINE IF THERE IS FISTULOUS COMMUNICATION WITH SMALL BOWEL IN THE PELVIS.      OUTSIDE IMAGES-CT ABDOMEN /PELVIS   Final Result           Assessment/Plan  Rectal mass- (present on admission)  Assessment & Plan  Incidentally discovered on CT scan  Patient has an end ileostomy, does not describe any rectal pain or BRBPR  Possible enlarged prostate on differential, possible associated LUTS, consider outpatient Alpha-2 blocker  Check CEA which is normal  Check PSA with reflex to free PSA, awaiting  results  Outpatient work up    Tobacco chew use- (present on admission)  Assessment & Plan  Recommend cessation    Abdominal fluid collection- (present on admission)  Assessment & Plan  Noted on outside hospital CT, clincally stable  Per outside CT abd: 3.7 by 3.9 cm fluid collection posterior to bladder which is an increase from prior imaging study on 3/10/2022  IR drain placed 3/22, possible fistulous communication with bowel, but likely chronic  Initially did not appear infected, but now intra operative fluid cultures c/w at this time with pan-sensitive E coli  Started IV zosyn 3.25  CT enterography shows no clear e/o fistula (surgery thinks there is one) but does show a 3 cm fluid collection (so improving by 1 cm compared to outside scan)  CT A/P Oral/IV contrast repeat on 3/25 shows no clear fistulous connection  ID, GS, IR following  CT A/P 3/28 shows fluid collection gone, again, NO e/o fistula  Likely remove join  Oral abx's and discharge tomorrow if GS and ID are ok with plan    Leukocytosis- (present on admission)  Assessment & Plan  Resolved initially withoiut abx's  Now appears on imaging with possible intra-abdominal abscess versus fistula versus both  See below  Trend WBC/fever curve    Hypokalemia- (present on admission)  Assessment & Plan  Normalized, replacement, daily labs, from high output ileostomy       VTE prophylaxis: lovenox    I have performed a physical exam and reviewed and updated ROS and Plan today (3/28/2022). In review of yesterday's note (3/27/2022), there are no changes except as documented above.

## 2022-03-28 NOTE — PROGRESS NOTES
Infectious Disease Progress Note    Author: Hilaria Ho M.D. Date & Time of service: 3/28/2022  12:01 PM    Chief Complaint:  abd abscess  Probable fistula      Interval History:  51 y.o. male with h/o recurrent colon cancer admitted 3/21/2022 as transfer from [place] for N/V due to SBO. Found to have presacral abscess and probable fistula  3/25 AF WBC 5.8 tolerating some PO No immediate plan for surgery  3/26 AF WBC 5.9 tolerating diet well-feeling much better. Denies SE abx  3/28 AF WBC 5.3 plan for repeat CT today    Labs Reviewed, Medications Reviewed and Wound Reviewed.    Review of Systems:  Review of Systems   Constitutional: Negative for fever.   Respiratory: Negative for shortness of breath.        Hemodynamics:  Temp (24hrs), Av.3 °C (97.4 °F), Min:36.2 °C (97.1 °F), Max:36.8 °C (98.2 °F)  Temperature: 36.2 °C (97.2 °F)  Pulse  Av.8  Min: 47  Max: 112   Blood Pressure: (!) 94/58       Physical Exam:  Physical Exam  Vitals and nursing note reviewed.   Constitutional:       General: He is not in acute distress.  HENT:      Nose: No rhinorrhea.      Mouth/Throat:      Pharynx: No oropharyngeal exudate or posterior oropharyngeal erythema.   Cardiovascular:      Rate and Rhythm: Normal rate.   Pulmonary:      Effort: Pulmonary effort is normal. No respiratory distress.      Breath sounds: No stridor.   Abdominal:      Comments: Drain  Ostomy  Well healed midline scar   Skin:     Coloration: Skin is pale.      Findings: Bruising present.         Meds:    Current Facility-Administered Medications:   •  [COMPLETED] piperacillin-tazobactam **AND** piperacillin-tazobactam  •  HYDROcodone-acetaminophen  •  diphenhydrAMINE  •  morphine injection  •  senna-docusate **AND** polyethylene glycol/lytes **AND** magnesium hydroxide **AND** bisacodyl  •  enoxaparin (LOVENOX) injection  •  acetaminophen  •  Notify provider if pain remains uncontrolled **AND** Use the Numeric Rating Scale (NRS), Ervin-Hendrix  Faces (WBF), or FLACC on regular floors and Critical-Care Pain Observation Tool (CPOT) on ICUs/Trauma to assess pain **AND** Pulse Ox **AND** Pharmacy Consult Request **AND** If patient difficult to arouse and/or has respiratory depression (respiratory rate of 10 or less), stop any opiates that are currently infusing and call a Rapid Response.  •  labetalol  •  ondansetron  •  ondansetron  •  promethazine  •  promethazine  •  prochlorperazine    Labs:  Recent Labs     03/26/22  0612 03/27/22  0312 03/28/22  0215   WBC 5.9 5.6 5.3   RBC 4.57* 4.34* 4.33*   HEMOGLOBIN 14.4 13.7* 13.4*   HEMATOCRIT 41.5* 40.3* 39.3*   MCV 90.8 92.9 90.8   MCH 31.5 31.6 30.9   RDW 43.1 44.2 43.4   PLATELETCT 382 353 360   MPV 9.2 9.0 9.1   NEUTSPOLYS 54.30 50.90 46.20   LYMPHOCYTES 34.80 38.00 41.80*   MONOCYTES 7.20 7.10 7.50   EOSINOPHILS 2.00 2.70 2.60   BASOPHILS 1.20 0.90 1.30     Recent Labs     03/26/22  0612   SODIUM 136   POTASSIUM 3.6   CHLORIDE 102   CO2 22   GLUCOSE 90   BUN 9     Recent Labs     03/26/22  0612   CREATININE 1.00       Imaging:  CT-DRAIN-PERITONEAL    Result Date: 3/22/2022  3/22/2022 4:31 PM HISTORY/REASON FOR EXAM:  Noted intra-abdominal fluid collection near bladder on outside CT scan. Presacral fluid collection. History of total colectomy. Right lower quadrant ileostomy. Intermittent small bowel obstruction. TECHNIQUE/EXAM DESCRIPTION: Pelvic (presacral/retroperitoneal) abscess drainage with CT guidance. Low dose optimization technique was utilized for this CT exam including automated exposure control and adjustment of the mA and/or kV according to patient size. PROCEDURE: Informed consent was obtained. Localizing CT images were obtained with the patient in prone position. The skin was prepped with ChloraPrep and draped in a sterile fashion. Moderate sedation was provided. Pulse oximetry and continuous cardiac monitoring by the nurse was performed throughout the exam. Intraservice time was 30 minutes.  Following local anesthesia with 1% Lidocaine, a 17 G guiding needle was placed and needle path via a left transgluteal approach confirmed with CT. An Amplatz guidewire was placed and following serial tract dilatation, a 8 Occitan pigtail locking catheter was placed. A specimen was collected and submitted for culture and sensitivity and Gram stain. Total of 10 mL initially butterscotch colored fluid turning to pink opaque fluid was drained. The initial fluid returned resembled enteric contents. The catheter was secured to the skin and connected to suction bulb drainage. Final CT images were obtained documenting catheter position. The patient tolerated the procedure well with no evidence of complication. COMPARISON: CT of the abdomen and pelvis outside films 3/20/2022 FINDINGS: Initial CT images again show an approximately 3.7 cm presacral collection with an air-fluid level. The final CT images show satisfactory catheter position within the targeted presacral collection.     1.  CT GUIDED RETROPERITONEAL PELVIC/PRESACRAL CATHETER DRAINAGE. INITIAL FLUID RETURN RESEMBLED ENTERIC CONTENTS. A FISTULOUS LEAK MAY BE PRESENT. LIKEWISE, THE AIR CONTENT SUGGESTS THE POSSIBILITY OF COMMUNICATION WITH BOWEL. 2.  THE CURRENT PLAN IS TO MONITOR DRAINAGE OUTPUT, IRRIGATE DAILY AND OBTAIN A FOLLOWUP CT SCAN IN 5-7 DAYS IF CLINICALLY INDICATED. 3. ABSCESSOGRAM MAY BE OF INTEREST PRIOR TO CATHETER REMOVAL TO DETERMINE IF THERE IS FISTULOUS COMMUNICATION WITH SMALL BOWEL IN THE PELVIS.    CT-ABDOMEN&PELVIS ENTEROGRAPHY    Result Date: 3/25/2022  CT abdomen and pelvis with contrast 3/24/2022 1725 hours is HISTORY/REASON FOR EXAM:  Bowel obstruction suspected; Pelvic fluid collection - S/P IR drain placement.  Pt with Hx of total abdominal colectomy. possible small bowel fistula; Possible fistula to pelvic fluid collection TECHNIQUE/EXAM DESCRIPTION:  CT abdomen and pelvis with contrast, enterography protocol. Following oral  administration of VoLumen oral contrast and water as well as intravenous administration of 100 mL of Omnipaque 350 nonionic contrast, thin section helical CT is performed from the level of the diaphragm through the ischial tuberosities. Axial, coronal, and sagittal images are sent to PACS. Low dose optimization technique was utilized for this CT exam including automated exposure control and adjustment of the mA and/or kV according to patient size. COMPARISON:  Outside CT abdomen and pelvis 3/20/2022 FINDINGS: The study was ordered and performed as CT enterography which utilizes negative oral contrast. This precludes assessment of a fistula. We called the patient's caregivers recommending that the scan be repeated and that is not possible at this time therefore, the scan is being interpreted as performed. LUNGS: The lung bases are clear. Left ventricle is probably dilated. Osseous structures:  No significant finding. Abdomen: LIVER: is normal in appearance. GALLBLADDER and BILIARY SYSTEM The gallbladder is normal in CT appearance. There is no biliary dilation. SPLEEN: Normal in appearance. PANCREAS: The pancreas is normal in appearance. The splenic vein and portal vein enhance normally. ADRENAL glands: Normal in appearance. RIGHT kidney: Normal in appearance. LEFT kidney: Normal in appearance. There is no hydronephrosis. BOWEL and MESENTERY: There is a right-sided ileostomy. There has been a colectomy. There is a presacral fluid collection containing a catheter. Fluid collection measures 2.9 x 2.2 cm consistent with an abscess and may indicate leakage. AORTA and VASCULATURE: Atherosclerosis without aneurysm. Pelvis: There is possibly a rectal mass versus prostate enlargement.     1.  Unchanged presacral/precoccygeal space fluid collection measuring 2.9 x 2.2 cm consistent with abscess 2.  Prior colectomy and there is a right lower quadrant ostomy 3.  No direct evidence for fistula although cannot be excluded with the  "negative contrast administered for enterography. Consider repeat CT scan with standard oral contrast when clinically appropriate 4.  Postoperative changes in the presacral/precoccygeal space 5.  Mass contiguous with the inferior aspect of rectal stump which could be tumor versus enlarged prostate. 6.  No other significant finding      Micro:  Results     Procedure Component Value Units Date/Time    BLOOD CULTURE [[id number]] Collected: 03/25/22 1603    Order Status: Completed Specimen: Blood from Peripheral Updated: 03/26/22 0721     Significant Indicator NEG     Source BLD     Site PERIPHERAL     Culture Result No Growth  Note: Blood cultures are incubated for 5 days and  are monitored continuously.Positive blood cultures  are called to the RN and reported as soon as  they are identified.      Narrative:      Per Hospital Policy: Only change Specimen Src: to \"Line\" if  specified by physician order.  Left Hand    BLOOD CULTURE [[id number]] Collected: 03/25/22 1448    Order Status: Completed Specimen: Blood from Peripheral Updated: 03/26/22 0721     Significant Indicator NEG     Source BLD     Site PERIPHERAL     Culture Result No Growth  Note: Blood cultures are incubated for 5 days and  are monitored continuously.Positive blood cultures  are called to the RN and reported as soon as  they are identified.      Narrative:      Per Hospital Policy: Only change Specimen Src: to \"Line\" if  specified by physician order.  Left Hand    FLUID CULTURE W/GRAM STAIN [[id number]]  (Abnormal)  (Susceptibility) Collected: 03/22/22 1606    Order Status: Completed Specimen: Body Fluid Updated: 03/24/22 1252     Significant Indicator POS     Source BF     Site Peritoneal Fluid     Culture Result Light growth mixed skin andrei.     Gram Stain Result Moderate WBCs.  Few Gram negative rods.       Culture Result Escherichia coli  Light growth      Narrative:      order req#[id number]    Susceptibility     Escherichia coli (1)     Antibiotic " Interpretation Microscan   Method Status    Ampicillin Sensitive <=8 mcg/mL PATTIE Final    Ceftriaxone Sensitive <=1 mcg/mL PATTIE Final    Cefazolin Sensitive <=2 mcg/mL PATTIE Final    Ciprofloxacin Sensitive <=0.25 mcg/mL PATTIE Final    Cefepime Sensitive <=2 mcg/mL PATTIE Final    Cefuroxime Sensitive <=4 mcg/mL PATTIE Final    Ampicillin/sulbactam Sensitive <=4/2 mcg/mL PATTIE Final    Ertapenem Sensitive <=0.5 mcg/mL PATTIE Final    Tobramycin Sensitive <=2 mcg/mL PATTIE Final    Gentamicin Sensitive <=2 mcg/mL PATTIE Final    Minocycline Sensitive <=4 mcg/mL PATTIE Final    Moxifloxacin Sensitive <=2 mcg/mL PATTIE Final    Pip/Tazobactam Sensitive <=8 mcg/mL PATTIE Final    Trimeth/Sulfa Sensitive <=0.5/9.5 mcg/mL PATTIE Final    Tigecycline Sensitive <=2 mcg/mL PATTIE Final                   GRAM STAIN [[id number]] Collected: 03/22/22 1606    Order Status: Completed Specimen: Body Fluid Updated: 03/23/22 0611     Significant Indicator .     Source BF     Site Peritoneal Fluid     Gram Stain Result Moderate WBCs.  Few Gram negative rods.      Narrative:      order req#[id number]    FLUID CULTURE W/GRAM STAIN [[id number]]     Order Status: No result Specimen: Other Body Fluid           Assessment:  Active Hospital Problems    Diagnosis    • *Small bowel obstruction (HCC) [K56.609]    • Abdominal fluid collection [R18.8]    • Tobacco chew use [Z72.0]    • Leukocytosis [D72.829]    • Hypokalemia [E87.6]      Recurrent SBO  H/o recurrent colon cancer-rectal mass on CT  Probable fistula vs perforation  Intraabd abscess-Ecoli-not improved on CT 3/25  Leukocytosis resolved        PLAN:   Continue Zosyn   Repeat CT 3/25 : Unchanged presacral/precoccygeal space fluid collection measuring 2.9 x 2.2 cm consistent with abscess 2.  Prior colectomy and there is a right lower quadrant ostomy 3.  No direct evidence for fistula although cannot be excluded with the negative contrast administered for enterography. Consider repeat CT scan with standard oral contrast when  clinically appropriate 4.  Postoperative changes in the presacral/precoccygeal space 5.  Mass contiguous with the inferior aspect of rectal stump which could be tumor versus enlarged prostate  Need for larger drain per IR. No current plan for surgery  If fistula present will require weeks of antibiotics + drain to promote healing. Will not need IV if no fistula-please clarify.   Plan for repeat CT today  Recommend PICC if fistula as will require extended course IV antibiotics to promote spontaneous closure  Final antibiotic recommendations per culture results and clinical course  Further eval rectal stump mass per PCT

## 2022-03-28 NOTE — PROGRESS NOTES
Report received from AM RN; assumed care. A&O x 4. VSS. *** on ***L NC. Patient denying SOB, numbness, tingling, nausea, vomiting, dizziness. Medicated for pain; see MAR.  Assessment complete. Abdomen ***. + void; yellow urine noted in mckenzie. Lines *** + eructation. + flatus. LBM ***. Patient tolerating *** diet. Patient up *** with ***. *** gait noted.  Discussed plan of care with patient. All questions answered.  *** fall risk. Bed/strip alarm engaged. Bed in locked/lowest position.  Call light/personal belongings within reach.  All needs met, patient ***.  Patient has no other needs at this time.

## 2022-03-28 NOTE — PROGRESS NOTES
"Radiology Progress Note   Author: NGHIA Perea Date & Time created: 3/28/2022  1:58 PM   Date of admission  3/21/2022  Note to reader: this note follows the APSO format rather than the historical SOAP format. Assessment and plan located at the top of the note for ease of use.    Chief Complaint  51 y.o. male admitted 3/21/2022 with abd pain     HPI  \" Beau Mcintyre is a 51 y.o. male admitted 3/21/2022 with h/o colorectal cancer and multiple surgeries with end ileostomy and recurrent SBO's presents from outside hospital with recurrent SBO and possible posterior bladder intra abdominal fluid collection\"      Assessment/Plan  Interval History   Active Problems:    Hypokalemia    Leukocytosis    Abdominal fluid collection    Tobacco chew use    Rectal mass      Plan IR  - Irrigate Left flank drain with 10 ml of sterile saline twice per shift   - Fluid cultures + Escherichia coli  - Surgery and ID following   - Antibiotic per ID   - Continue to monitor drains, VS and labs   - Surgery Consulted,  fistula non operatively with NETTA  - Will remove NETTA drain tomorrow 3/29 in AM, minimal/scant output and no residual fluid collection seen on CT       IR:   3/22- Left posterior pelvic drain placed, 270 ml output    3/23-  65 ml in am of bloody brown drainage. Leukocytosis resolved 7.4, CO discomfort at drain site. Drain site CDI, no redness or swelling  3/24-  WBC 6.2, 100 ml brown fluid output.   3/25- CT scan from today show abscess unchanged presacral/precoccygeal space fluid collection measuring 2.9 x 2.2 cm. No direct evidence for fistula although cannot be excluded with the negative contrast administered for enterography.      3/28- CT shows no residual fluid collection evident about the pigtail loop. Enteric contrast opacifying pelvic small bowel loops. No appreciable communication of contrast into the presacral collection.         Review of Systems  Physical Exam   Review of Systems   Constitutional: Positive " for malaise/fatigue. Negative for chills and fever.   HENT: Negative for hearing loss.    Eyes: Negative for blurred vision.   Respiratory: Negative for cough, hemoptysis and shortness of breath.    Cardiovascular: Negative for chest pain and palpitations.   Gastrointestinal: Negative for abdominal pain and vomiting.        Colostomy    Genitourinary: Negative for dysuria.   Musculoskeletal: Negative for back pain and myalgias.        Discomfort at drain site    Skin: Negative for rash.   Neurological: Negative for dizziness, weakness and headaches.   Endo/Heme/Allergies: Does not bruise/bleed easily.   Psychiatric/Behavioral: Negative for suicidal ideas.      Vitals:    03/28/22 1216   BP: (!) 94/63   Pulse: 62   Resp: 18   Temp: 36.3 °C (97.3 °F)   SpO2: 94%        Physical Exam  Constitutional:       Appearance: Normal appearance.   HENT:      Head: Normocephalic.      Nose: Nose normal.      Mouth/Throat:      Mouth: Mucous membranes are moist.   Eyes:      Pupils: Pupils are equal, round, and reactive to light.   Cardiovascular:      Rate and Rhythm: Normal rate.   Pulmonary:      Effort: Pulmonary effort is normal. No respiratory distress.   Abdominal:      Palpations: Abdomen is soft.      Tenderness: There is no abdominal tenderness.      Comments: Colostomy    Musculoskeletal:         General: No tenderness or deformity.      Cervical back: Normal range of motion.        Back:    Skin:     General: Skin is warm and dry.      Capillary Refill: Capillary refill takes less than 2 seconds.      Coloration: Skin is not jaundiced or pale.   Neurological:      General: No focal deficit present.      Mental Status: He is alert.      Motor: No weakness.   Psychiatric:         Mood and Affect: Mood normal.         Behavior: Behavior normal.             Labs    Recent Labs     03/26/22  0612 03/27/22  0312 03/28/22  0215   WBC 5.9 5.6 5.3   RBC 4.57* 4.34* 4.33*   HEMOGLOBIN 14.4 13.7* 13.4*   HEMATOCRIT 41.5* 40.3*  39.3*   MCV 90.8 92.9 90.8   MCH 31.5 31.6 30.9   MCHC 34.7 34.0 34.1   RDW 43.1 44.2 43.4   PLATELETCT 382 353 360   MPV 9.2 9.0 9.1     Recent Labs     03/26/22  0612   SODIUM 136   POTASSIUM 3.6   CHLORIDE 102   CO2 22   GLUCOSE 90   BUN 9   CREATININE 1.00   CALCIUM 8.7     Recent Labs     03/26/22  0612   CREATININE 1.00     CT-PELVIS WITH   Final Result      1.  Right lower quadrant ileostomy.   2.  Left transgluteal posterior pelvic/presacral pigtail drainage catheter remains in place. No residual fluid collection evident about the pigtail loop.   3.  Enteric contrast opacifying pelvic small bowel loops. No appreciable communication of contrast into the presacral collection.   4.  Catheter abscessogram may be of interest if there is strong clinical suspicion for a fistula.      CT-ABDOMEN-PELVIS WITH   Final Result         1. No contrast is seen within the presacral fluid collection to suggest fistulous connection.      CT-ABDOMEN&PELVIS ENTEROGRAPHY   Final Result      1.  Unchanged presacral/precoccygeal space fluid collection measuring 2.9 x 2.2 cm consistent with abscess      2.  Prior colectomy and there is a right lower quadrant ostomy      3.  No direct evidence for fistula although cannot be excluded with the negative contrast administered for enterography. Consider repeat CT scan with standard oral contrast when clinically appropriate      4.  Postoperative changes in the presacral/precoccygeal space      5.  Mass contiguous with the inferior aspect of rectal stump which could be tumor versus enlarged prostate.      6.  No other significant finding      CT-DRAIN-PERITONEAL   Final Result      1.  CT GUIDED RETROPERITONEAL PELVIC/PRESACRAL CATHETER DRAINAGE. INITIAL FLUID RETURN RESEMBLED ENTERIC CONTENTS. A FISTULOUS LEAK MAY BE PRESENT. LIKEWISE, THE AIR CONTENT SUGGESTS THE POSSIBILITY OF COMMUNICATION WITH BOWEL.   2.  THE CURRENT PLAN IS TO MONITOR DRAINAGE OUTPUT, IRRIGATE DAILY AND OBTAIN A  FOLLOWUP CT SCAN IN 5-7 DAYS IF CLINICALLY INDICATED.   3. ABSCESSOGRAM MAY BE OF INTEREST PRIOR TO CATHETER REMOVAL TO DETERMINE IF THERE IS FISTULOUS COMMUNICATION WITH SMALL BOWEL IN THE PELVIS.      OUTSIDE IMAGES-CT ABDOMEN /PELVIS   Final Result          INR   Date Value Ref Range Status   03/21/2022 1.05 0.87 - 1.13 Final     Comment:     INR - Non-therapeutic Reference Range: 0.87-1.13  INR - Therapeutic Reference Range: 2.0-4.0       No results found for: POCINR     Intake/Output Summary (Last 24 hours) at 3/23/2022 1341  Last data filed at 3/23/2022 1147  Gross per 24 hour   Intake 633.75 ml   Output 2610 ml   Net -1976.25 ml      Labs not explicitly included in this progress note were reviewed by the author. Radiology/imaging not explicitly included in this progress note was reviewed by the author.     I have performed a physical exam and reviewed and updated ROS and Plan today (3/28/2022).     20 minutes in directly providing and coordinating care and extensive data review.  No time overlap and excludes procedures.

## 2022-03-29 ENCOUNTER — APPOINTMENT (OUTPATIENT)
Dept: RADIOLOGY | Facility: MEDICAL CENTER | Age: 52
DRG: 372 | End: 2022-03-29
Attending: SURGERY
Payer: COMMERCIAL

## 2022-03-29 PROBLEM — E83.42 HYPOMAGNESEMIA: Status: ACTIVE | Noted: 2022-03-29

## 2022-03-29 LAB
ANION GAP SERPL CALC-SCNC: 11 MMOL/L (ref 7–16)
BASOPHILS # BLD AUTO: 1.6 % (ref 0–1.8)
BASOPHILS # BLD: 0.08 K/UL (ref 0–0.12)
BUN SERPL-MCNC: 7 MG/DL (ref 8–22)
CALCIUM SERPL-MCNC: 8.5 MG/DL (ref 8.5–10.5)
CHLORIDE SERPL-SCNC: 104 MMOL/L (ref 96–112)
CO2 SERPL-SCNC: 22 MMOL/L (ref 20–33)
CREAT SERPL-MCNC: 1.06 MG/DL (ref 0.5–1.4)
EOSINOPHIL # BLD AUTO: 0.13 K/UL (ref 0–0.51)
EOSINOPHIL NFR BLD: 2.6 % (ref 0–6.9)
ERYTHROCYTE [DISTWIDTH] IN BLOOD BY AUTOMATED COUNT: 44.5 FL (ref 35.9–50)
GFR SERPLBLD CREATININE-BSD FMLA CKD-EPI: 85 ML/MIN/1.73 M 2
GLUCOSE SERPL-MCNC: 136 MG/DL (ref 65–99)
HCT VFR BLD AUTO: 38.5 % (ref 42–52)
HGB BLD-MCNC: 13.2 G/DL (ref 14–18)
IMM GRANULOCYTES # BLD AUTO: 0.02 K/UL (ref 0–0.11)
IMM GRANULOCYTES NFR BLD AUTO: 0.4 % (ref 0–0.9)
LYMPHOCYTES # BLD AUTO: 1.83 K/UL (ref 1–4.8)
LYMPHOCYTES NFR BLD: 36.9 % (ref 22–41)
MAGNESIUM SERPL-MCNC: 1.8 MG/DL (ref 1.5–2.5)
MCH RBC QN AUTO: 31.5 PG (ref 27–33)
MCHC RBC AUTO-ENTMCNC: 34.3 G/DL (ref 33.7–35.3)
MCV RBC AUTO: 91.9 FL (ref 81.4–97.8)
MONOCYTES # BLD AUTO: 0.35 K/UL (ref 0–0.85)
MONOCYTES NFR BLD AUTO: 7.1 % (ref 0–13.4)
NEUTROPHILS # BLD AUTO: 2.55 K/UL (ref 1.82–7.42)
NEUTROPHILS NFR BLD: 51.4 % (ref 44–72)
NRBC # BLD AUTO: 0 K/UL
NRBC BLD-RTO: 0 /100 WBC
PHOSPHATE SERPL-MCNC: 2.8 MG/DL (ref 2.5–4.5)
PLATELET # BLD AUTO: 318 K/UL (ref 164–446)
PMV BLD AUTO: 9.1 FL (ref 9–12.9)
POTASSIUM SERPL-SCNC: 4 MMOL/L (ref 3.6–5.5)
RBC # BLD AUTO: 4.19 M/UL (ref 4.7–6.1)
SODIUM SERPL-SCNC: 137 MMOL/L (ref 135–145)
WBC # BLD AUTO: 5 K/UL (ref 4.8–10.8)

## 2022-03-29 PROCEDURE — 83735 ASSAY OF MAGNESIUM: CPT

## 2022-03-29 PROCEDURE — 36415 COLL VENOUS BLD VENIPUNCTURE: CPT

## 2022-03-29 PROCEDURE — 99232 SBSQ HOSP IP/OBS MODERATE 35: CPT | Performed by: INTERNAL MEDICINE

## 2022-03-29 PROCEDURE — 80048 BASIC METABOLIC PNL TOTAL CA: CPT

## 2022-03-29 PROCEDURE — 74018 RADEX ABDOMEN 1 VIEW: CPT

## 2022-03-29 PROCEDURE — 700105 HCHG RX REV CODE 258: Performed by: INTERNAL MEDICINE

## 2022-03-29 PROCEDURE — 99233 SBSQ HOSP IP/OBS HIGH 50: CPT | Performed by: FAMILY MEDICINE

## 2022-03-29 PROCEDURE — 700111 HCHG RX REV CODE 636 W/ 250 OVERRIDE (IP): Performed by: INTERNAL MEDICINE

## 2022-03-29 PROCEDURE — 700102 HCHG RX REV CODE 250 W/ 637 OVERRIDE(OP): Performed by: FAMILY MEDICINE

## 2022-03-29 PROCEDURE — 700102 HCHG RX REV CODE 250 W/ 637 OVERRIDE(OP): Performed by: HOSPITALIST

## 2022-03-29 PROCEDURE — A9270 NON-COVERED ITEM OR SERVICE: HCPCS | Performed by: HOSPITALIST

## 2022-03-29 PROCEDURE — 700111 HCHG RX REV CODE 636 W/ 250 OVERRIDE (IP): Performed by: HOSPITALIST

## 2022-03-29 PROCEDURE — 770001 HCHG ROOM/CARE - MED/SURG/GYN PRIV*

## 2022-03-29 PROCEDURE — 85025 COMPLETE CBC W/AUTO DIFF WBC: CPT

## 2022-03-29 PROCEDURE — 99232 SBSQ HOSP IP/OBS MODERATE 35: CPT | Mod: FS | Performed by: NURSE PRACTITIONER

## 2022-03-29 PROCEDURE — A9270 NON-COVERED ITEM OR SERVICE: HCPCS | Performed by: FAMILY MEDICINE

## 2022-03-29 PROCEDURE — 84100 ASSAY OF PHOSPHORUS: CPT

## 2022-03-29 RX ADMIN — MORPHINE SULFATE 2 MG: 4 INJECTION INTRAVENOUS at 20:14

## 2022-03-29 RX ADMIN — Medication 400 MG: at 18:09

## 2022-03-29 RX ADMIN — PIPERACILLIN AND TAZOBACTAM 3.38 G: 3; .375 INJECTION, POWDER, LYOPHILIZED, FOR SOLUTION INTRAVENOUS; PARENTERAL at 15:16

## 2022-03-29 RX ADMIN — ENOXAPARIN SODIUM 40 MG: 40 INJECTION SUBCUTANEOUS at 15:17

## 2022-03-29 RX ADMIN — HYDROCODONE BITARTRATE AND ACETAMINOPHEN 1 TABLET: 5; 325 TABLET ORAL at 07:51

## 2022-03-29 RX ADMIN — PIPERACILLIN AND TAZOBACTAM 3.38 G: 3; .375 INJECTION, POWDER, LYOPHILIZED, FOR SOLUTION INTRAVENOUS; PARENTERAL at 04:12

## 2022-03-29 RX ADMIN — HYDROCODONE BITARTRATE AND ACETAMINOPHEN 1 TABLET: 5; 325 TABLET ORAL at 15:20

## 2022-03-29 RX ADMIN — MORPHINE SULFATE 2 MG: 4 INJECTION INTRAVENOUS at 10:24

## 2022-03-29 RX ADMIN — SENNOSIDES AND DOCUSATE SODIUM 2 TABLET: 50; 8.6 TABLET ORAL at 18:09

## 2022-03-29 RX ADMIN — DIPHENHYDRAMINE HYDROCHLORIDE 25 MG: 25 TABLET ORAL at 01:55

## 2022-03-29 RX ADMIN — SENNOSIDES AND DOCUSATE SODIUM 2 TABLET: 50; 8.6 TABLET ORAL at 04:12

## 2022-03-29 RX ADMIN — MORPHINE SULFATE 2 MG: 4 INJECTION INTRAVENOUS at 23:48

## 2022-03-29 RX ADMIN — MORPHINE SULFATE 2 MG: 4 INJECTION INTRAVENOUS at 01:54

## 2022-03-29 RX ADMIN — PIPERACILLIN AND TAZOBACTAM 3.38 G: 3; .375 INJECTION, POWDER, LYOPHILIZED, FOR SOLUTION INTRAVENOUS; PARENTERAL at 20:15

## 2022-03-29 RX ADMIN — HYDROCODONE BITARTRATE AND ACETAMINOPHEN 1 TABLET: 5; 325 TABLET ORAL at 21:34

## 2022-03-29 ASSESSMENT — ENCOUNTER SYMPTOMS
ABDOMINAL PAIN: 0
MYALGIAS: 0
SPEECH CHANGE: 0
DIAPHORESIS: 0
HEMOPTYSIS: 0
BRUISES/BLEEDS EASILY: 0
NERVOUS/ANXIOUS: 0
BLURRED VISION: 0
PALPITATIONS: 0
SENSORY CHANGE: 0
WEAKNESS: 0
WHEEZING: 0
SHORTNESS OF BREATH: 0
FOCAL WEAKNESS: 0
CHILLS: 0
DIZZINESS: 0
VOMITING: 0
HEADACHES: 0
FEVER: 0
NECK PAIN: 0
ROS GI COMMENTS: COLOSTOMY
FLANK PAIN: 0
HEARTBURN: 0
COUGH: 0
ABDOMINAL PAIN: 1
SORE THROAT: 0
DIARRHEA: 0
NAUSEA: 0
BACK PAIN: 0

## 2022-03-29 ASSESSMENT — PAIN DESCRIPTION - PAIN TYPE
TYPE: ACUTE PAIN

## 2022-03-29 NOTE — PROGRESS NOTES
"    DATE: 3/29/2022    Hospital Day 8 Small bowel obstruction and pelvic collection.    INTERVAL EVENTS:  CT completed, demonstrates resolution of fluid collection  NETTA 70ml / 24 hour output    REVIEW OF SYSTEMS: Comprehensive review of systems is negative with the exception of the aforementioned HPI, PMH, and PSH bullets in accordance with CMS guidelines.    PHYSICAL EXAMINATION:      Constitutional:     Vital Signs: Blood Pressure 100/70   Pulse 60   Temperature 36.6 °C (97.9 °F) (Temporal)   Respiration 17   Height 1.88 m (6' 2.02\")   Weight 88.8 kg (195 lb 12.3 oz)   Oxygen Saturation 93%    General Appearance: The patient is a pleasant  and cooperative man in no critical distress.  HEENT:    No significant external craniofacial trauma. The pupils are equal, round, and reactive to light bilaterally.  Neck:    The trachea is midline.  Respiratory:   Inspection: Unlabored respirations, no intercostal retractions, paradoxical motion, or accessory muscle use.  Cardiovascular:   Inspection: The skin is warm and dry.  Abdomen:   Inspection: Abdominal inspection reveals ileostomy in place.  Posterior NETTA in place with bilious output.  Skin:    Examination of the skin reveals no significant abrasions, contusion, lacerations, or other soft tissue injury.  Neurologic:    Neurologic examination reveals no focal deficits noted.  Psychiatric:   The patient does not appear depressed or anxious.    LABORATORY VALUES:   Recent Labs     03/27/22  0312 03/28/22  0215 03/29/22  0903   WBC 5.6 5.3 5.0   RBC 4.34* 4.33* 4.19*   HEMOGLOBIN 13.7* 13.4* 13.2*   HEMATOCRIT 40.3* 39.3* 38.5*   MCV 92.9 90.8 91.9   MCH 31.6 30.9 31.5   MCHC 34.0 34.1 34.3   RDW 44.2 43.4 44.5   PLATELETCT 353 360 318   MPV 9.0 9.1 9.1     Recent Labs     03/29/22  0903   SODIUM 137   POTASSIUM 4.0   CHLORIDE 104   CO2 22   GLUCOSE 136*   BUN 7*   CREATININE 1.06   CALCIUM 8.5                IMAGING:   CT-PELVIS WITH   Final Result      1.  Right lower " quadrant ileostomy.   2.  Left transgluteal posterior pelvic/presacral pigtail drainage catheter remains in place. No residual fluid collection evident about the pigtail loop.   3.  Enteric contrast opacifying pelvic small bowel loops. No appreciable communication of contrast into the presacral collection.   4.  Catheter abscessogram may be of interest if there is strong clinical suspicion for a fistula.      CT-ABDOMEN-PELVIS WITH   Final Result         1. No contrast is seen within the presacral fluid collection to suggest fistulous connection.      CT-ABDOMEN&PELVIS ENTEROGRAPHY   Final Result      1.  Unchanged presacral/precoccygeal space fluid collection measuring 2.9 x 2.2 cm consistent with abscess      2.  Prior colectomy and there is a right lower quadrant ostomy      3.  No direct evidence for fistula although cannot be excluded with the negative contrast administered for enterography. Consider repeat CT scan with standard oral contrast when clinically appropriate      4.  Postoperative changes in the presacral/precoccygeal space      5.  Mass contiguous with the inferior aspect of rectal stump which could be tumor versus enlarged prostate.      6.  No other significant finding      CT-DRAIN-PERITONEAL   Final Result      1.  CT GUIDED RETROPERITONEAL PELVIC/PRESACRAL CATHETER DRAINAGE. INITIAL FLUID RETURN RESEMBLED ENTERIC CONTENTS. A FISTULOUS LEAK MAY BE PRESENT. LIKEWISE, THE AIR CONTENT SUGGESTS THE POSSIBILITY OF COMMUNICATION WITH BOWEL.   2.  THE CURRENT PLAN IS TO MONITOR DRAINAGE OUTPUT, IRRIGATE DAILY AND OBTAIN A FOLLOWUP CT SCAN IN 5-7 DAYS IF CLINICALLY INDICATED.   3. ABSCESSOGRAM MAY BE OF INTEREST PRIOR TO CATHETER REMOVAL TO DETERMINE IF THERE IS FISTULOUS COMMUNICATION WITH SMALL BOWEL IN THE PELVIS.      OUTSIDE IMAGES-CT ABDOMEN /PELVIS   Final Result      [id number] VIEW    (Results Pending)       ASSESSMENT AND PLAN:     Abdominal fluid collection- (present on  admission)  Assessment & Plan  Fluid collection in pelvis  IR drain placed 3/22  Bilious drainage  3/24 CT to evaluate for fistula- likely fistula   3/25 NETTA output decreasing  3/27 NETTA output decreasing   3/28 Repeat CT shows no fluid collection near drain  3/29 X-ray abdomen to include drain to determine if fistula is present      Continue drain, discussed with radiology  Abdominal x-ray ordered to look for presence of contrast in pigtail drain       ____________________________________     NGHIA Soto    DD: 3/29/2022  10:57 AM

## 2022-03-29 NOTE — PROGRESS NOTES
Hospital Medicine Daily Progress Note    Date of Service  3/29/2022    Chief Complaint  Beau Mcintyre is a 51 y.o. male admitted 3/21/2022 with abdominal fluid collection.    Hospital Course  Admitted as a transfer for small bowel obstruction and abdominal fluid collection.  The small bowel obstruction spontaneously resolved.  For the abdominal fluid collection, surgery was consulted on the case.  Patient underwent CT-guided retroperitoneal pelvic/presacral catheter drainage on 3/22/2022 by interventional radiology.  He was started empiric coverage with IV Zosyn.  Fluid culture showed E. coli which was pansensitive.  Infectious disease was consulted on the case.    Interval Problem Update  Abdominal fluid collection -significant output noted, discussed case with interventional radiology  Low magnesium    I have personally seen and examined the patient at bedside. I discussed the plan of care with patient, bedside RN, charge RN,  and Interventional radiology.    Consultants/Specialty  general surgery, infectious disease and Interventional radiology    Code Status  Full Code    Disposition  Patient is not medically cleared for discharge.   Anticipate discharge to to home with close outpatient follow-up.  I have placed the appropriate orders for post-discharge needs.    Review of Systems  Review of Systems   Constitutional: Negative for chills, diaphoresis, fever and malaise/fatigue.   HENT: Negative for congestion, hearing loss and sore throat.    Eyes: Negative for blurred vision.   Respiratory: Negative for cough, shortness of breath and wheezing.    Cardiovascular: Negative for chest pain, palpitations and leg swelling.   Gastrointestinal: Positive for abdominal pain. Negative for diarrhea, heartburn, nausea and vomiting.   Genitourinary: Negative for dysuria, flank pain and hematuria.   Musculoskeletal: Negative for back pain, joint pain, myalgias and neck pain.   Skin: Negative for rash.    Neurological: Negative for dizziness, sensory change, speech change, focal weakness, weakness and headaches.   Psychiatric/Behavioral: The patient is not nervous/anxious.         Physical Exam  Temp:  [36.1 °C (97 °F)-36.6 °C (97.9 °F)] 36.6 °C (97.9 °F)  Pulse:  [60-70] 60  Resp:  [17-19] 17  BP: ()/(51-70) 100/70  SpO2:  [93 %-96 %] 93 %    Physical Exam  Vitals and nursing note reviewed.   HENT:      Head: Normocephalic and atraumatic.      Nose: No congestion.      Mouth/Throat:      Mouth: Mucous membranes are moist.   Eyes:      Extraocular Movements: Extraocular movements intact.      Conjunctiva/sclera: Conjunctivae normal.   Cardiovascular:      Rate and Rhythm: Normal rate and regular rhythm.   Pulmonary:      Effort: Pulmonary effort is normal.      Breath sounds: Normal breath sounds.   Abdominal:      General: There is no distension.      Tenderness: There is abdominal tenderness (mild, pelvic area). There is no guarding or rebound.      Comments: RLQ ostomy  Left gluteal drain   Musculoskeletal:      Cervical back: No tenderness.      Right lower leg: No edema.      Left lower leg: No edema.   Skin:     General: Skin is warm and dry.   Neurological:      General: No focal deficit present.      Mental Status: He is alert and oriented to person, place, and time.      Cranial Nerves: No cranial nerve deficit.         Fluids    Intake/Output Summary (Last 24 hours) at 3/29/2022 1419  Last data filed at 3/29/2022 1210  Gross per 24 hour   Intake 460 ml   Output 30 ml   Net 430 ml       Laboratory  Recent Labs     03/27/22  0312 03/28/22  0215 03/29/22  0903   WBC 5.6 5.3 5.0   RBC 4.34* 4.33* 4.19*   HEMOGLOBIN 13.7* 13.4* 13.2*   HEMATOCRIT 40.3* 39.3* 38.5*   MCV 92.9 90.8 91.9   MCH 31.6 30.9 31.5   MCHC 34.0 34.1 34.3   RDW 44.2 43.4 44.5   PLATELETCT 353 360 318   MPV 9.0 9.1 9.1     Recent Labs     03/29/22  0903   SODIUM 137   POTASSIUM 4.0   CHLORIDE 104   CO2 22   GLUCOSE 136*   BUN 7*    CREATININE 1.06   CALCIUM 8.5                   Imaging  CT-PELVIS WITH   Final Result      1.  Right lower quadrant ileostomy.   2.  Left transgluteal posterior pelvic/presacral pigtail drainage catheter remains in place. No residual fluid collection evident about the pigtail loop.   3.  Enteric contrast opacifying pelvic small bowel loops. No appreciable communication of contrast into the presacral collection.   4.  Catheter abscessogram may be of interest if there is strong clinical suspicion for a fistula.      CT-ABDOMEN-PELVIS WITH   Final Result         1. No contrast is seen within the presacral fluid collection to suggest fistulous connection.      CT-ABDOMEN&PELVIS ENTEROGRAPHY   Final Result      1.  Unchanged presacral/precoccygeal space fluid collection measuring 2.9 x 2.2 cm consistent with abscess      2.  Prior colectomy and there is a right lower quadrant ostomy      3.  No direct evidence for fistula although cannot be excluded with the negative contrast administered for enterography. Consider repeat CT scan with standard oral contrast when clinically appropriate      4.  Postoperative changes in the presacral/precoccygeal space      5.  Mass contiguous with the inferior aspect of rectal stump which could be tumor versus enlarged prostate.      6.  No other significant finding      CT-DRAIN-PERITONEAL   Final Result      1.  CT GUIDED RETROPERITONEAL PELVIC/PRESACRAL CATHETER DRAINAGE. INITIAL FLUID RETURN RESEMBLED ENTERIC CONTENTS. A FISTULOUS LEAK MAY BE PRESENT. LIKEWISE, THE AIR CONTENT SUGGESTS THE POSSIBILITY OF COMMUNICATION WITH BOWEL.   2.  THE CURRENT PLAN IS TO MONITOR DRAINAGE OUTPUT, IRRIGATE DAILY AND OBTAIN A FOLLOWUP CT SCAN IN 5-7 DAYS IF CLINICALLY INDICATED.   3. ABSCESSOGRAM MAY BE OF INTEREST PRIOR TO CATHETER REMOVAL TO DETERMINE IF THERE IS FISTULOUS COMMUNICATION WITH SMALL BOWEL IN THE PELVIS.      OUTSIDE IMAGES-CT ABDOMEN /PELVIS   Final Result      [id number] VIEW     (Results Pending)        Assessment/Plan  * Abdominal fluid collection- (present on admission)  Assessment & Plan  CT-guided retroperitoneal pelvic/presacral catheter drainage 3/22/2022  IV Zosyn  Drain management per Interventional radiology/Surgery      SBO (small bowel obstruction) (HCC)- (present on admission)  Assessment & Plan  Resolved    Hypomagnesemia- (present on admission)  Assessment & Plan  Start oral Mg    Rectal mass- (present on admission)  Assessment & Plan  History of colorectal cancer  Follow-up with colorectal surgery as outpatient    Hypokalemia- (present on admission)  Assessment & Plan  Follow BMP    Tobacco chew use- (present on admission)  Assessment & Plan  Education and counseling       VTE prophylaxis: enoxaparin ppx    I have performed a physical exam and reviewed and updated ROS and Plan today (3/29/2022). In review of yesterday's note (3/28/2022), there are no changes except as documented above.

## 2022-03-29 NOTE — PROGRESS NOTES
Infectious Disease Progress Note    Author: Hilaria Ho M.D. Date & Time of service: 3/29/2022  1:27 PM    Chief Complaint:  abd abscess  Probable fistula      Interval History:  51 y.o. male with h/o recurrent colon cancer admitted 3/21/2022 as transfer from [place] for N/V due to SBO. Found to have presacral abscess and probable fistula  3/25 AF WBC 5.8 tolerating some PO No immediate plan for surgery  3/26 AF WBC 5.9 tolerating diet well-feeling much better. Denies SE abx  3/28 AF WBC 5.3 plan for repeat CT today  3/29 AF eating well planned for repeat imaging    Labs Reviewed, Medications Reviewed and Wound Reviewed.    Review of Systems:  Review of Systems   Constitutional: Negative for fever.   Respiratory: Negative for shortness of breath.    Gastrointestinal: Negative for nausea and vomiting.   Skin: Negative for itching and rash.   All other systems reviewed and are negative.      Hemodynamics:  Temp (24hrs), Av.4 °C (97.5 °F), Min:36.1 °C (97 °F), Max:36.6 °C (97.9 °F)  Temperature: 36.6 °C (97.9 °F)  Pulse  Av.8  Min: 47  Max: 112   Blood Pressure: 100/70       Physical Exam:  Physical Exam  Vitals and nursing note reviewed.   Constitutional:       General: He is not in acute distress.     Appearance: He is not ill-appearing, toxic-appearing or diaphoretic.   HENT:      Nose: No rhinorrhea.      Mouth/Throat:      Pharynx: No oropharyngeal exudate or posterior oropharyngeal erythema.   Eyes:      General: No scleral icterus.     Extraocular Movements: Extraocular movements intact.      Pupils: Pupils are equal, round, and reactive to light.   Cardiovascular:      Rate and Rhythm: Normal rate.   Pulmonary:      Effort: Pulmonary effort is normal. No respiratory distress.      Breath sounds: No stridor.   Abdominal:      General: There is no distension.      Palpations: Abdomen is soft.      Comments: Drain grey green fluid fluid  Ostomy  Well healed midline scar   Musculoskeletal:       Cervical back: Neck supple. No rigidity.   Skin:     Coloration: Skin is pale.      Findings: Bruising present.   Neurological:      General: No focal deficit present.      Mental Status: He is alert and oriented to person, place, and time.   Psychiatric:         Mood and Affect: Mood normal.         Behavior: Behavior normal.         Meds:    Current Facility-Administered Medications:   •  [COMPLETED] piperacillin-tazobactam **AND** piperacillin-tazobactam  •  HYDROcodone-acetaminophen  •  diphenhydrAMINE  •  morphine injection  •  senna-docusate **AND** polyethylene glycol/lytes **AND** magnesium hydroxide **AND** bisacodyl  •  enoxaparin (LOVENOX) injection  •  acetaminophen  •  Notify provider if pain remains uncontrolled **AND** Use the Numeric Rating Scale (NRS), Ervin-Baker Faces (WBF), or FLACC on regular floors and Critical-Care Pain Observation Tool (CPOT) on ICUs/Trauma to assess pain **AND** Pulse Ox **AND** Pharmacy Consult Request **AND** If patient difficult to arouse and/or has respiratory depression (respiratory rate of 10 or less), stop any opiates that are currently infusing and call a Rapid Response.  •  labetalol  •  ondansetron  •  ondansetron  •  promethazine  •  promethazine  •  prochlorperazine    Labs:  Recent Labs     03/27/22  0312 03/28/22  0215 03/29/22  0903   WBC 5.6 5.3 5.0   RBC 4.34* 4.33* 4.19*   HEMOGLOBIN 13.7* 13.4* 13.2*   HEMATOCRIT 40.3* 39.3* 38.5*   MCV 92.9 90.8 91.9   MCH 31.6 30.9 31.5   RDW 44.2 43.4 44.5   PLATELETCT 353 360 318   MPV 9.0 9.1 9.1   NEUTSPOLYS 50.90 46.20 51.40   LYMPHOCYTES 38.00 41.80* 36.90   MONOCYTES 7.10 7.50 7.10   EOSINOPHILS 2.70 2.60 2.60   BASOPHILS 0.90 1.30 1.60     Recent Labs     03/29/22  0903   SODIUM 137   POTASSIUM 4.0   CHLORIDE 104   CO2 22   GLUCOSE 136*   BUN 7*     Recent Labs     03/29/22  0903   CREATININE 1.06       Imaging:  CT-DRAIN-PERITONEAL    Result Date: 3/22/2022  3/22/2022 4:31 PM HISTORY/REASON FOR EXAM:  Noted  intra-abdominal fluid collection near bladder on outside CT scan. Presacral fluid collection. History of total colectomy. Right lower quadrant ileostomy. Intermittent small bowel obstruction. TECHNIQUE/EXAM DESCRIPTION: Pelvic (presacral/retroperitoneal) abscess drainage with CT guidance. Low dose optimization technique was utilized for this CT exam including automated exposure control and adjustment of the mA and/or kV according to patient size. PROCEDURE: Informed consent was obtained. Localizing CT images were obtained with the patient in prone position. The skin was prepped with ChloraPrep and draped in a sterile fashion. Moderate sedation was provided. Pulse oximetry and continuous cardiac monitoring by the nurse was performed throughout the exam. Intraservice time was 30 minutes. Following local anesthesia with 1% Lidocaine, a 17 G guiding needle was placed and needle path via a left transgluteal approach confirmed with CT. An Amplatz guidewire was placed and following serial tract dilatation, a 8 German pigtail locking catheter was placed. A specimen was collected and submitted for culture and sensitivity and Gram stain. Total of 10 mL initially butterscotch colored fluid turning to pink opaque fluid was drained. The initial fluid returned resembled enteric contents. The catheter was secured to the skin and connected to suction bulb drainage. Final CT images were obtained documenting catheter position. The patient tolerated the procedure well with no evidence of complication. COMPARISON: CT of the abdomen and pelvis outside films 3/20/2022 FINDINGS: Initial CT images again show an approximately 3.7 cm presacral collection with an air-fluid level. The final CT images show satisfactory catheter position within the targeted presacral collection.     1.  CT GUIDED RETROPERITONEAL PELVIC/PRESACRAL CATHETER DRAINAGE. INITIAL FLUID RETURN RESEMBLED ENTERIC CONTENTS. A FISTULOUS LEAK MAY BE PRESENT. LIKEWISE, THE AIR  CONTENT SUGGESTS THE POSSIBILITY OF COMMUNICATION WITH BOWEL. 2.  THE CURRENT PLAN IS TO MONITOR DRAINAGE OUTPUT, IRRIGATE DAILY AND OBTAIN A FOLLOWUP CT SCAN IN 5-7 DAYS IF CLINICALLY INDICATED. 3. ABSCESSOGRAM MAY BE OF INTEREST PRIOR TO CATHETER REMOVAL TO DETERMINE IF THERE IS FISTULOUS COMMUNICATION WITH SMALL BOWEL IN THE PELVIS.    CT-ABDOMEN&PELVIS ENTEROGRAPHY    Result Date: 3/25/2022  CT abdomen and pelvis with contrast 3/24/2022 1725 hours is HISTORY/REASON FOR EXAM:  Bowel obstruction suspected; Pelvic fluid collection - S/P IR drain placement.  Pt with Hx of total abdominal colectomy. possible small bowel fistula; Possible fistula to pelvic fluid collection TECHNIQUE/EXAM DESCRIPTION:  CT abdomen and pelvis with contrast, enterography protocol. Following oral administration of VoLumen oral contrast and water as well as intravenous administration of 100 mL of Omnipaque 350 nonionic contrast, thin section helical CT is performed from the level of the diaphragm through the ischial tuberosities. Axial, coronal, and sagittal images are sent to PACS. Low dose optimization technique was utilized for this CT exam including automated exposure control and adjustment of the mA and/or kV according to patient size. COMPARISON:  Outside CT abdomen and pelvis 3/20/2022 FINDINGS: The study was ordered and performed as CT enterography which utilizes negative oral contrast. This precludes assessment of a fistula. We called the patient's caregivers recommending that the scan be repeated and that is not possible at this time therefore, the scan is being interpreted as performed. LUNGS: The lung bases are clear. Left ventricle is probably dilated. Osseous structures:  No significant finding. Abdomen: LIVER: is normal in appearance. GALLBLADDER and BILIARY SYSTEM The gallbladder is normal in CT appearance. There is no biliary dilation. SPLEEN: Normal in appearance. PANCREAS: The pancreas is normal in appearance. The splenic  "vein and portal vein enhance normally. ADRENAL glands: Normal in appearance. RIGHT kidney: Normal in appearance. LEFT kidney: Normal in appearance. There is no hydronephrosis. BOWEL and MESENTERY: There is a right-sided ileostomy. There has been a colectomy. There is a presacral fluid collection containing a catheter. Fluid collection measures 2.9 x 2.2 cm consistent with an abscess and may indicate leakage. AORTA and VASCULATURE: Atherosclerosis without aneurysm. Pelvis: There is possibly a rectal mass versus prostate enlargement.     1.  Unchanged presacral/precoccygeal space fluid collection measuring 2.9 x 2.2 cm consistent with abscess 2.  Prior colectomy and there is a right lower quadrant ostomy 3.  No direct evidence for fistula although cannot be excluded with the negative contrast administered for enterography. Consider repeat CT scan with standard oral contrast when clinically appropriate 4.  Postoperative changes in the presacral/precoccygeal space 5.  Mass contiguous with the inferior aspect of rectal stump which could be tumor versus enlarged prostate. 6.  No other significant finding      Micro:  Results     Procedure Component Value Units Date/Time    BLOOD CULTURE [[id number]] Collected: 03/25/22 1603    Order Status: Completed Specimen: Blood from Peripheral Updated: 03/26/22 0721     Significant Indicator NEG     Source BLD     Site PERIPHERAL     Culture Result No Growth  Note: Blood cultures are incubated for 5 days and  are monitored continuously.Positive blood cultures  are called to the RN and reported as soon as  they are identified.      Narrative:      Per Hospital Policy: Only change Specimen Src: to \"Line\" if  specified by physician order.  Left Hand    BLOOD CULTURE [[id number]] Collected: 03/25/22 1448    Order Status: Completed Specimen: Blood from Peripheral Updated: 03/26/22 0721     Significant Indicator NEG     Source BLD     Site PERIPHERAL     Culture Result No Growth  Note: Blood " "cultures are incubated for 5 days and  are monitored continuously.Positive blood cultures  are called to the RN and reported as soon as  they are identified.      Narrative:      Per Hospital Policy: Only change Specimen Src: to \"Line\" if  specified by physician order.  Left Hand    FLUID CULTURE W/GRAM STAIN [[id number]]  (Abnormal)  (Susceptibility) Collected: 03/22/22 1606    Order Status: Completed Specimen: Body Fluid Updated: 03/24/22 1252     Significant Indicator POS     Source BF     Site Peritoneal Fluid     Culture Result Light growth mixed skin andrei.     Gram Stain Result Moderate WBCs.  Few Gram negative rods.       Culture Result Escherichia coli  Light growth      Narrative:      order req#[id number]    Susceptibility     Escherichia coli (1)     Antibiotic Interpretation Microscan   Method Status    Ampicillin Sensitive <=8 mcg/mL PATTIE Final    Ceftriaxone Sensitive <=1 mcg/mL PATTIE Final    Cefazolin Sensitive <=2 mcg/mL PATTIE Final    Ciprofloxacin Sensitive <=0.25 mcg/mL PATTIE Final    Cefepime Sensitive <=2 mcg/mL PATTIE Final    Cefuroxime Sensitive <=4 mcg/mL PATTIE Final    Ampicillin/sulbactam Sensitive <=4/2 mcg/mL PATTIE Final    Ertapenem Sensitive <=0.5 mcg/mL PATTIE Final    Tobramycin Sensitive <=2 mcg/mL PATTIE Final    Gentamicin Sensitive <=2 mcg/mL PATTIE Final    Minocycline Sensitive <=4 mcg/mL PATTIE Final    Moxifloxacin Sensitive <=2 mcg/mL PATTIE Final    Pip/Tazobactam Sensitive <=8 mcg/mL PATTIE Final    Trimeth/Sulfa Sensitive <=0.5/9.5 mcg/mL PATTIE Final    Tigecycline Sensitive <=2 mcg/mL PATTIE Final                   GRAM STAIN [[id number]] Collected: 03/22/22 1606    Order Status: Completed Specimen: Body Fluid Updated: 03/23/22 0611     Significant Indicator .     Source BF     Site Peritoneal Fluid     Gram Stain Result Moderate WBCs.  Few Gram negative rods.      Narrative:      order req#[id number]    FLUID CULTURE W/GRAM STAIN [[id number]]     Order Status: No result Specimen: Other Body Fluid     "       Assessment:  Active Hospital Problems    Diagnosis    • *Small bowel obstruction (HCC) [K56.609]    • Abdominal fluid collection [R18.8]    • Tobacco chew use [Z72.0]    • Leukocytosis [D72.829]    • Hypokalemia [E87.6]      Recurrent SBO  H/o recurrent colon cancer-rectal mass on CT  Probable fistula vs perforation  Intraabd abscess-Ecoli-not improved on CT 3/25  Leukocytosis resolved        PLAN:   Continue Zosyn   Repeat CT 3/25 : Unchanged presacral/precoccygeal space fluid collection measuring 2.9 x 2.2 cm consistent with abscess 2.  Prior colectomy and there is a right lower quadrant ostomy 3.  No direct evidence for fistula although cannot be excluded with the negative contrast administered for enterography. Consider repeat CT scan with standard oral contrast when clinically appropriate 4.  Postoperative changes in the presacral/precoccygeal space 5.  Mass contiguous with the inferior aspect of rectal stump which could be tumor versus enlarged prostate  Need for larger drain per IR. No current plan for surgery  CT 3/28 no obvious fistula-no residual fluid and scant drain output. Catheter abscessogram recommended  If fistula present will require weeks of antibiotics + drain to promote healing. Will not need IV if no fistula-please clarify.   IF fistula, place PICC for extended course IV antibiotics to promote spontaneous closure  IF no fistula, anticipate discharge on PO antibiotics  Final antibiotic recommendations per imaging results and clinical course  Further eval rectal stump mass per PCT

## 2022-03-29 NOTE — PROGRESS NOTES
"Radiology Progress Note   Author: NGHIA Perea Date & Time created: 3/29/2022  11:54 AM   Date of admission  3/21/2022  Note to reader: this note follows the APSO format rather than the historical SOAP format. Assessment and plan located at the top of the note for ease of use.    Chief Complaint  51 y.o. male admitted 3/21/2022 with abd pain     HPI  \" Beau Mcintyre is a 51 y.o. male admitted 3/21/2022 with h/o colorectal cancer and multiple surgeries with end ileostomy and recurrent SBO's presents from outside hospital with recurrent SBO and possible posterior bladder intra abdominal fluid collection\"      Assessment/Plan  Interval History   Active Problems:    Hypokalemia    Leukocytosis    Abdominal fluid collection    Tobacco chew use    Rectal mass      Plan IR  - Irrigate Left flank drain with 10 ml of sterile saline twice per shift   - Fluid cultures + Escherichia coli  - Surgery and ID following   - Antibiotic per ID   - Continue to monitor drains, VS and labs   - Pending KUB per surgery note  - Recommend IR abscess o gram to rule out fistula         IR:   3/22- Left posterior pelvic drain placed, 270 ml output    3/23-  65 ml in am of bloody brown drainage. Leukocytosis resolved 7.4, CO discomfort at drain site. Drain site CDI, no redness or swelling  3/24-  WBC 6.2, 100 ml brown fluid output.   3/25- CT scan from today show abscess unchanged presacral/precoccygeal space fluid collection measuring 2.9 x 2.2 cm. No direct evidence for fistula although cannot be excluded with the negative contrast administered for enterography.    3/28- CT shows no residual fluid collection evident about the pigtail loop. Enteric contrast opacifying pelvic small bowel loops. No appreciable communication of contrast into the presacral collection. 20 ml of brown fluid collection   3/29 - 20 ml of brown, WBC WNL 5.0          Review of Systems  Physical Exam   Review of Systems   Constitutional: Positive for " malaise/fatigue. Negative for chills and fever.   HENT: Negative for hearing loss.    Eyes: Negative for blurred vision.   Respiratory: Negative for cough, hemoptysis and shortness of breath.    Cardiovascular: Negative for chest pain and palpitations.   Gastrointestinal: Negative for abdominal pain and vomiting.        Colostomy    Genitourinary: Negative for dysuria.   Musculoskeletal: Negative for back pain and myalgias.        Discomfort at drain site    Skin: Negative for rash.   Neurological: Negative for dizziness, weakness and headaches.   Endo/Heme/Allergies: Does not bruise/bleed easily.   Psychiatric/Behavioral: Negative for suicidal ideas.      Vitals:    03/29/22 0755   BP: 100/70   Pulse: 60   Resp: 17   Temp: 36.6 °C (97.9 °F)   SpO2: 93%        Physical Exam  Constitutional:       Appearance: Normal appearance.   HENT:      Head: Normocephalic.      Nose: Nose normal.      Mouth/Throat:      Mouth: Mucous membranes are moist.   Eyes:      Pupils: Pupils are equal, round, and reactive to light.   Cardiovascular:      Rate and Rhythm: Normal rate.   Pulmonary:      Effort: Pulmonary effort is normal. No respiratory distress.   Abdominal:      Palpations: Abdomen is soft.      Tenderness: There is no abdominal tenderness.      Comments: Colostomy    Musculoskeletal:         General: No tenderness or deformity.      Cervical back: Normal range of motion.        Back:    Skin:     General: Skin is warm and dry.      Capillary Refill: Capillary refill takes less than 2 seconds.      Coloration: Skin is not jaundiced or pale.   Neurological:      General: No focal deficit present.      Mental Status: He is alert.      Motor: No weakness.   Psychiatric:         Mood and Affect: Mood normal.         Behavior: Behavior normal.             Labs    Recent Labs     03/27/22  0312 03/28/22  0215 03/29/22  0903   WBC 5.6 5.3 5.0   RBC 4.34* 4.33* 4.19*   HEMOGLOBIN 13.7* 13.4* 13.2*   HEMATOCRIT 40.3* 39.3* 38.5*    MCV 92.9 90.8 91.9   MCH 31.6 30.9 31.5   MCHC 34.0 34.1 34.3   RDW 44.2 43.4 44.5   PLATELETCT 353 360 318   MPV 9.0 9.1 9.1     Recent Labs     03/29/22  0903   SODIUM 137   POTASSIUM 4.0   CHLORIDE 104   CO2 22   GLUCOSE 136*   BUN 7*   CREATININE 1.06   CALCIUM 8.5     Recent Labs     03/29/22  0903   CREATININE 1.06     CT-PELVIS WITH   Final Result      1.  Right lower quadrant ileostomy.   2.  Left transgluteal posterior pelvic/presacral pigtail drainage catheter remains in place. No residual fluid collection evident about the pigtail loop.   3.  Enteric contrast opacifying pelvic small bowel loops. No appreciable communication of contrast into the presacral collection.   4.  Catheter abscessogram may be of interest if there is strong clinical suspicion for a fistula.      CT-ABDOMEN-PELVIS WITH   Final Result         1. No contrast is seen within the presacral fluid collection to suggest fistulous connection.      CT-ABDOMEN&PELVIS ENTEROGRAPHY   Final Result      1.  Unchanged presacral/precoccygeal space fluid collection measuring 2.9 x 2.2 cm consistent with abscess      2.  Prior colectomy and there is a right lower quadrant ostomy      3.  No direct evidence for fistula although cannot be excluded with the negative contrast administered for enterography. Consider repeat CT scan with standard oral contrast when clinically appropriate      4.  Postoperative changes in the presacral/precoccygeal space      5.  Mass contiguous with the inferior aspect of rectal stump which could be tumor versus enlarged prostate.      6.  No other significant finding      CT-DRAIN-PERITONEAL   Final Result      1.  CT GUIDED RETROPERITONEAL PELVIC/PRESACRAL CATHETER DRAINAGE. INITIAL FLUID RETURN RESEMBLED ENTERIC CONTENTS. A FISTULOUS LEAK MAY BE PRESENT. LIKEWISE, THE AIR CONTENT SUGGESTS THE POSSIBILITY OF COMMUNICATION WITH BOWEL.   2.  THE CURRENT PLAN IS TO MONITOR DRAINAGE OUTPUT, IRRIGATE DAILY AND OBTAIN A  FOLLOWUP CT SCAN IN 5-7 DAYS IF CLINICALLY INDICATED.   3. ABSCESSOGRAM MAY BE OF INTEREST PRIOR TO CATHETER REMOVAL TO DETERMINE IF THERE IS FISTULOUS COMMUNICATION WITH SMALL BOWEL IN THE PELVIS.      OUTSIDE IMAGES-CT ABDOMEN /PELVIS   Final Result      BI-[id number] VIEW    (Results Pending)       INR   Date Value Ref Range Status   03/21/2022 1.05 0.87 - 1.13 Final     Comment:     INR - Non-therapeutic Reference Range: 0.87-1.13  INR - Therapeutic Reference Range: 2.0-4.0       No results found for: POCINR     Intake/Output Summary (Last 24 hours) at 3/23/2022 1341  Last data filed at 3/23/2022 1147  Gross per 24 hour   Intake 633.75 ml   Output 2610 ml   Net -1976.25 ml      Labs not explicitly included in this progress note were reviewed by the author. Radiology/imaging not explicitly included in this progress note was reviewed by the author.     I have performed a physical exam and reviewed and updated ROS and Plan today (3/29/2022).     15 minutes in directly providing and coordinating care and extensive data review.  No time overlap and excludes procedures.

## 2022-03-29 NOTE — PROGRESS NOTES
Assumed care of patient at 0645. Bedside report received. Assessment complete.  AA&Ox4. Denies CP/SOB.  Reporting 5/10 pain. Medication given for pain control.    Educated patient regarding pharmacologic and non pharmacologic modalities for pain management.  Skin per flow sheets.  Tolerating diet. Denies N/V.  + void. Last BM this AM through ostomy.  Pt ambulates by himself with no assistance needed.  Plan of care discussed, all questions answered. Educated on the importance of calling before getting OOB and pt verbalizes understanding. Educated regarding importance of oral care. Oral care kit at bedside. Call light is within reach, treaded slipper socks on, bed in lowest/ locked position, hourly rounding in place, all needs met at this time.    Will continue to monitor.     Kathy Meza R.N.

## 2022-03-29 NOTE — DISCHARGE PLANNING
Care Transition Team Discharge Planning    Anticipates discharge disposition:  • Likely Home     Action:  • Pt was discussed in ICT rounds today and per Dr Yates Pt is pending I.D. recommendations for final antibiotic on discharge.   • Pt might need ssistance with transport to [facility].    This RN CM met with Pt at bedside. Per Pt he has no Medicare and only has [employer] for Insurance.    Per Pt he has no family or friends to provide transport to home. When this RN CM suggested [employer] Pt agrred with it and Per Pt he can call [employer] himself so he can give his credit card information.    Per Pt his physical address is [address] [place] [id number].    Barriers to Discharge:  •  Pending medical clearance  • Pending transport to home    Plan:  • CM to continue to assist Pt with discharge as needed

## 2022-03-29 NOTE — CARE PLAN
Problem: Knowledge Deficit - Standard  Goal: Patient and family/care givers will demonstrate understanding of plan of care, disease process/condition, diagnostic tests and medications  Outcome: Progressing     Problem: Pain - Standard  Goal: Alleviation of pain or a reduction in pain to the patient’s comfort goal  Outcome: Progressing     Problem: Psychosocial  Goal: Patient's level of anxiety will decrease  Outcome: Progressing  Goal: Patient's ability to verbalize feelings about condition will improve  Outcome: Progressing  Goal: Patient's ability to re-evaluate and adapt role responsibilities will improve  Outcome: Progressing  Goal: Patient and family will demonstrate ability to cope with life altering diagnosis and/or procedure  Outcome: Progressing  Goal: Spiritual and cultural needs incorporated into hospitalization  Outcome: Progressing     Problem: Communication  Goal: The ability to communicate needs accurately and effectively will improve  Outcome: Progressing     Problem: Discharge Barriers/Planning  Goal: Patient's continuum of care needs are met  Outcome: Progressing     Problem: Hemodynamics  Goal: Patient's hemodynamics, fluid balance and neurologic status will be stable or improve  Outcome: Progressing     Problem: Respiratory  Goal: Patient will achieve/maintain optimum respiratory ventilation and gas exchange  Outcome: Progressing     Problem: Chest Tube Management  Goal: Complications related to chest tube will be avoided or minimized  Outcome: Progressing     Problem: Fluid Volume  Goal: Fluid volume balance will be maintained  Outcome: Progressing     Problem: Dysphagia  Goal: Dysphagia will improve  Outcome: Progressing     Problem: Risk for Aspiration  Goal: Patient's risk for aspiration will be absent or decrease  Outcome: Progressing     Problem: Nutrition  Goal: Patient's nutritional and fluid intake will be adequate or improve  Outcome: Progressing  Goal: Enteral nutrition will be  maintained or improve  Outcome: Progressing  Goal: Enteral nutrition will be maintained or improve  Outcome: Progressing     Problem: Urinary Elimination  Goal: Establish and maintain regular urinary output  Outcome: Progressing     Problem: Bowel Elimination  Goal: Establish and maintain regular bowel function  Outcome: Progressing     Problem: Gastrointestinal Irritability  Goal: Nausea and vomiting will be absent or improve  Outcome: Progressing  Goal: Diarrhea will be absent or improved  Outcome: Progressing     Problem: Rectal Tube  Goal: Fecal output will be contained and skin will remain free from irritation  Outcome: Progressing     Problem: Mobility  Goal: Patient's capacity to carry out activities will improve  Outcome: Progressing     Problem: Self Care  Goal: Patient will have the ability to perform ADLs independently or with assistance (bathe, groom, dress, toilet and feed)  Outcome: Progressing     Problem: Infection - Standard  Goal: Patient will remain free from infection  Outcome: Progressing     Problem: Wound/ / Incision Healing  Goal: Patient's wound/surgical incision will decrease in size and heals properly  Outcome: Progressing   The patient is stable.      Shift Goals  Clinical Goals: pain control, monitor drain output  Patient Goals: pain control    Progress made toward(s) clinical / shift goals:  Pt ambulates by self. NETTA output as charted. Medications used for pain control.     Patient is not progressing towards the following goals: N/A    Kathy Meza R.N.

## 2022-03-30 ENCOUNTER — APPOINTMENT (OUTPATIENT)
Dept: RADIOLOGY | Facility: MEDICAL CENTER | Age: 52
DRG: 372 | End: 2022-03-30
Attending: NURSE PRACTITIONER
Payer: COMMERCIAL

## 2022-03-30 ENCOUNTER — APPOINTMENT (OUTPATIENT)
Dept: RADIOLOGY | Facility: MEDICAL CENTER | Age: 52
DRG: 372 | End: 2022-03-30
Attending: INTERNAL MEDICINE
Payer: COMMERCIAL

## 2022-03-30 ENCOUNTER — APPOINTMENT (OUTPATIENT)
Dept: RADIOLOGY | Facility: MEDICAL CENTER | Age: 52
DRG: 372 | End: 2022-03-30
Attending: SURGERY
Payer: COMMERCIAL

## 2022-03-30 PROBLEM — L98.8 FISTULA: Status: ACTIVE | Noted: 2022-03-30

## 2022-03-30 LAB
ANION GAP SERPL CALC-SCNC: 9 MMOL/L (ref 7–16)
BACTERIA BLD CULT: NORMAL
BACTERIA BLD CULT: NORMAL
BASOPHILS # BLD AUTO: 1 % (ref 0–1.8)
BASOPHILS # BLD: 0.05 K/UL (ref 0–0.12)
BUN SERPL-MCNC: 8 MG/DL (ref 8–22)
CALCIUM SERPL-MCNC: 8.3 MG/DL (ref 8.5–10.5)
CHLORIDE SERPL-SCNC: 106 MMOL/L (ref 96–112)
CO2 SERPL-SCNC: 23 MMOL/L (ref 20–33)
CREAT SERPL-MCNC: 0.92 MG/DL (ref 0.5–1.4)
EOSINOPHIL # BLD AUTO: 0.12 K/UL (ref 0–0.51)
EOSINOPHIL NFR BLD: 2.3 % (ref 0–6.9)
ERYTHROCYTE [DISTWIDTH] IN BLOOD BY AUTOMATED COUNT: 44.9 FL (ref 35.9–50)
GFR SERPLBLD CREATININE-BSD FMLA CKD-EPI: 101 ML/MIN/1.73 M 2
GLUCOSE SERPL-MCNC: 100 MG/DL (ref 65–99)
HCT VFR BLD AUTO: 38.9 % (ref 42–52)
HGB BLD-MCNC: 12.9 G/DL (ref 14–18)
IMM GRANULOCYTES # BLD AUTO: 0.02 K/UL (ref 0–0.11)
IMM GRANULOCYTES NFR BLD AUTO: 0.4 % (ref 0–0.9)
LYMPHOCYTES # BLD AUTO: 2.04 K/UL (ref 1–4.8)
LYMPHOCYTES NFR BLD: 39.6 % (ref 22–41)
MCH RBC QN AUTO: 30.9 PG (ref 27–33)
MCHC RBC AUTO-ENTMCNC: 33.2 G/DL (ref 33.7–35.3)
MCV RBC AUTO: 93.3 FL (ref 81.4–97.8)
MONOCYTES # BLD AUTO: 0.45 K/UL (ref 0–0.85)
MONOCYTES NFR BLD AUTO: 8.7 % (ref 0–13.4)
NEUTROPHILS # BLD AUTO: 2.47 K/UL (ref 1.82–7.42)
NEUTROPHILS NFR BLD: 48 % (ref 44–72)
NRBC # BLD AUTO: 0 K/UL
NRBC BLD-RTO: 0 /100 WBC
PLATELET # BLD AUTO: 312 K/UL (ref 164–446)
PMV BLD AUTO: 9 FL (ref 9–12.9)
POTASSIUM SERPL-SCNC: 3.8 MMOL/L (ref 3.6–5.5)
RBC # BLD AUTO: 4.17 M/UL (ref 4.7–6.1)
SIGNIFICANT IND 70042: NORMAL
SIGNIFICANT IND 70042: NORMAL
SITE SITE: NORMAL
SITE SITE: NORMAL
SODIUM SERPL-SCNC: 138 MMOL/L (ref 135–145)
SOURCE SOURCE: NORMAL
SOURCE SOURCE: NORMAL
WBC # BLD AUTO: 5.2 K/UL (ref 4.8–10.8)

## 2022-03-30 PROCEDURE — 700102 HCHG RX REV CODE 250 W/ 637 OVERRIDE(OP): Performed by: HOSPITALIST

## 2022-03-30 PROCEDURE — 700111 HCHG RX REV CODE 636 W/ 250 OVERRIDE (IP): Performed by: HOSPITALIST

## 2022-03-30 PROCEDURE — 700111 HCHG RX REV CODE 636 W/ 250 OVERRIDE (IP): Performed by: INTERNAL MEDICINE

## 2022-03-30 PROCEDURE — 99024 POSTOP FOLLOW-UP VISIT: CPT | Performed by: SURGERY

## 2022-03-30 PROCEDURE — BW111ZZ FLUOROSCOPY OF ABDOMEN AND PELVIS USING LOW OSMOLAR CONTRAST: ICD-10-PCS | Performed by: RADIOLOGY

## 2022-03-30 PROCEDURE — 36415 COLL VENOUS BLD VENIPUNCTURE: CPT

## 2022-03-30 PROCEDURE — A9270 NON-COVERED ITEM OR SERVICE: HCPCS | Performed by: HOSPITALIST

## 2022-03-30 PROCEDURE — 99233 SBSQ HOSP IP/OBS HIGH 50: CPT | Performed by: INTERNAL MEDICINE

## 2022-03-30 PROCEDURE — 49424 ASSESS CYST CONTRAST INJECT: CPT

## 2022-03-30 PROCEDURE — 700117 HCHG RX CONTRAST REV CODE 255: Performed by: NURSE PRACTITIONER

## 2022-03-30 PROCEDURE — A9270 NON-COVERED ITEM OR SERVICE: HCPCS | Performed by: FAMILY MEDICINE

## 2022-03-30 PROCEDURE — 85025 COMPLETE CBC W/AUTO DIFF WBC: CPT

## 2022-03-30 PROCEDURE — 700105 HCHG RX REV CODE 258: Performed by: INTERNAL MEDICINE

## 2022-03-30 PROCEDURE — 770001 HCHG ROOM/CARE - MED/SURG/GYN PRIV*

## 2022-03-30 PROCEDURE — 80048 BASIC METABOLIC PNL TOTAL CA: CPT

## 2022-03-30 PROCEDURE — 700102 HCHG RX REV CODE 250 W/ 637 OVERRIDE(OP): Performed by: FAMILY MEDICINE

## 2022-03-30 PROCEDURE — 99233 SBSQ HOSP IP/OBS HIGH 50: CPT | Performed by: FAMILY MEDICINE

## 2022-03-30 RX ADMIN — DIPHENHYDRAMINE HYDROCHLORIDE 25 MG: 25 TABLET ORAL at 01:55

## 2022-03-30 RX ADMIN — MORPHINE SULFATE 2 MG: 4 INJECTION INTRAVENOUS at 22:10

## 2022-03-30 RX ADMIN — HYDROCODONE BITARTRATE AND ACETAMINOPHEN 1 TABLET: 5; 325 TABLET ORAL at 08:08

## 2022-03-30 RX ADMIN — PIPERACILLIN AND TAZOBACTAM 3.38 G: 3; .375 INJECTION, POWDER, LYOPHILIZED, FOR SOLUTION INTRAVENOUS; PARENTERAL at 20:44

## 2022-03-30 RX ADMIN — MORPHINE SULFATE 2 MG: 4 INJECTION INTRAVENOUS at 11:30

## 2022-03-30 RX ADMIN — ENOXAPARIN SODIUM 40 MG: 40 INJECTION SUBCUTANEOUS at 14:27

## 2022-03-30 RX ADMIN — HYDROCODONE BITARTRATE AND ACETAMINOPHEN 1 TABLET: 5; 325 TABLET ORAL at 14:27

## 2022-03-30 RX ADMIN — Medication 400 MG: at 06:09

## 2022-03-30 RX ADMIN — PIPERACILLIN AND TAZOBACTAM 3.38 G: 3; .375 INJECTION, POWDER, LYOPHILIZED, FOR SOLUTION INTRAVENOUS; PARENTERAL at 06:08

## 2022-03-30 RX ADMIN — SENNOSIDES AND DOCUSATE SODIUM 2 TABLET: 50; 8.6 TABLET ORAL at 06:09

## 2022-03-30 RX ADMIN — IOHEXOL 40 ML: 300 INJECTION, SOLUTION INTRAVENOUS at 11:00

## 2022-03-30 RX ADMIN — HYDROCODONE BITARTRATE AND ACETAMINOPHEN 1 TABLET: 5; 325 TABLET ORAL at 01:55

## 2022-03-30 RX ADMIN — HYDROCODONE BITARTRATE AND ACETAMINOPHEN 1 TABLET: 5; 325 TABLET ORAL at 20:43

## 2022-03-30 RX ADMIN — PIPERACILLIN AND TAZOBACTAM 3.38 G: 3; .375 INJECTION, POWDER, LYOPHILIZED, FOR SOLUTION INTRAVENOUS; PARENTERAL at 14:28

## 2022-03-30 RX ADMIN — Medication 400 MG: at 18:19

## 2022-03-30 RX ADMIN — MORPHINE SULFATE 2 MG: 4 INJECTION INTRAVENOUS at 18:19

## 2022-03-30 ASSESSMENT — PAIN DESCRIPTION - PAIN TYPE
TYPE: ACUTE PAIN

## 2022-03-30 ASSESSMENT — ENCOUNTER SYMPTOMS
ABDOMINAL PAIN: 0
SPEECH CHANGE: 0
SENSORY CHANGE: 0
ROS GI COMMENTS: COLOSTOMY
FLANK PAIN: 0
BLURRED VISION: 0
BRUISES/BLEEDS EASILY: 0
NERVOUS/ANXIOUS: 0
DIARRHEA: 0
SORE THROAT: 0
VOMITING: 0
WHEEZING: 0
HEADACHES: 0
ABDOMINAL PAIN: 1
COUGH: 0
DIAPHORESIS: 0
HEARTBURN: 0
MYALGIAS: 0
DIZZINESS: 0
FOCAL WEAKNESS: 0
SHORTNESS OF BREATH: 0
PALPITATIONS: 0
NECK PAIN: 0
NAUSEA: 0
CHILLS: 0
WEAKNESS: 0
FEVER: 0
HEMOPTYSIS: 0
BACK PAIN: 0

## 2022-03-30 NOTE — DISCHARGE PLANNING
Care Transition Team Discharge Planning    Anticipates discharge disposition:  • Home with Home Health vs Home with OP Infusion Clinic Follow Up    Action:  • This RN CM met with Pt and discussef his options for discharge plans.    Per Pt he prefers to be referred to [facility] and if there is no OP Infusion Clinic there then his second choice is Home Health with [facility].    This RN CM spoke with Sheree at the [facility] at Tel [phone] ext [phone] at   [address] [facility] [place] [address].     Per Sheree fax the clinicals and she will ask the Pharmacist to look into the orders . The orders from Dr Ho is continuous so she will still verify if Pt can be accepted on  OP dosing.  Will reach out to Dr Ho too if this could be switched to OP dosing vs other antibiotic order?  Sent a message to Dr Ho via voalte about this, Awaiting response.    This RN CM faxed clinicals to above as requested for Pt's acceptance.    If Pt's first option will not work out, per Pt okay to send referral to [facility] and [facility]. Choices were faxed to Kailee DAVIS.    Dr Yates informed that Dr Ho faxed 6 weeks of IV Zosyn orders. If orders cannot be switched , will send referral to [facility]. Explained the case to Jayda Castro RN of [facility] that they are second choice in case [facility] cannot accept Pt. Per Jayda send the referral so they can submit insurance  auth. Informed Kailee DAVIS.     Per Pennie [facility] , Pt has active Medicare and [employer] Insurance.      Barriers to Discharge:  • Pending medical clearance  • Pending Pt''s acceptance at [facility] vs  with [facility]  • IV antibiotic Continuous dose? Vs OP dose?    Plan:  • CM to continue to assist Pt with discharge as needed

## 2022-03-30 NOTE — PROGRESS NOTES
Pt AO x 4  Vitals signs stable  Pt denies chest pain or SOB  O2 sat >90% on RA breathing unlabored  Pt endorses moderate L glute/drain site pain, PRNs given as ordered.    Pt denies N/V/D  + voiding, + output in ileostomy   IR drain to L flank. Flushed per orders, pt tolerated.      Updated plan of care discussed with pt. Safety education done. Falls precautions in place.   Pt safety maintained. Hourly rounding done.

## 2022-03-30 NOTE — DISCHARGE PLANNING
Received Choice form at 153  Agency/Facility Name: [facility]  Referral sent per Choice form @ 1530    Received Choice form at 1532  Agency/Facility Name: [facility]  Referral sent per Choice form @ 3826

## 2022-03-30 NOTE — PROGRESS NOTES
Infectious Disease Progress Note    Author: Hilaria Ho M.D. Date & Time of service: 3/30/2022  12:16 PM    Chief Complaint:  abd abscess  Probable fistula      Interval History:  51 y.o. male with h/o recurrent colon cancer admitted 3/21/2022 as transfer from [place] for N/V due to SBO. Found to have presacral abscess and probable fistula  3/25 AF WBC 5.8 tolerating some PO No immediate plan for surgery  3/26 AF WBC 5.9 tolerating diet well-feeling much better. Denies SE abx  3/28 AF WBC 5.3 plan for repeat CT today  3/29 AF eating well planned for repeat imaging  3/30 AF down for abscessogram-results reviewed +small fistula    Labs Reviewed, Medications Reviewed and Wound Reviewed.    Review of Systems:  Review of Systems   Constitutional: Negative for fever.   Respiratory: Negative for shortness of breath.        Hemodynamics:  Temp (24hrs), Av.7 °C (98 °F), Min:36.1 °C (96.9 °F), Max:37.1 °C (98.8 °F)  Temperature: 36.9 °C (98.5 °F)  Pulse  Av.2  Min: 47  Max: 112   Blood Pressure: 110/73       Physical Exam:  Physical Exam  Vitals and nursing note reviewed.   Cardiovascular:      Rate and Rhythm: Normal rate.   Pulmonary:      Effort: Pulmonary effort is normal. No respiratory distress.   Abdominal:      Comments: drain         Meds:    Current Facility-Administered Medications:   •  magnesium oxide  •  HYDROcodone-acetaminophen  •  diphenhydrAMINE  •  morphine injection  •  senna-docusate **AND** polyethylene glycol/lytes **AND** magnesium hydroxide **AND** bisacodyl  •  enoxaparin (LOVENOX) injection  •  acetaminophen  •  Notify provider if pain remains uncontrolled **AND** Use the Numeric Rating Scale (NRS), Ervin-Baker Faces (WBF), or FLACC on regular floors and Critical-Care Pain Observation Tool (CPOT) on ICUs/Trauma to assess pain **AND** Pulse Ox **AND** Pharmacy Consult Request **AND** If patient difficult to arouse and/or has respiratory depression (respiratory rate of 10 or less),  stop any opiates that are currently infusing and call a Rapid Response.  •  labetalol  •  ondansetron  •  ondansetron  •  promethazine  •  promethazine  •  prochlorperazine    Labs:  Recent Labs     03/28/22  0215 03/29/22  0903 03/30/22  0436   WBC 5.3 5.0 5.2   RBC 4.33* 4.19* 4.17*   HEMOGLOBIN 13.4* 13.2* 12.9*   HEMATOCRIT 39.3* 38.5* 38.9*   MCV 90.8 91.9 93.3   MCH 30.9 31.5 30.9   RDW 43.4 44.5 44.9   PLATELETCT 360 318 312   MPV 9.1 9.1 9.0   NEUTSPOLYS 46.20 51.40 48.00   LYMPHOCYTES 41.80* 36.90 39.60   MONOCYTES 7.50 7.10 8.70   EOSINOPHILS 2.60 2.60 2.30   BASOPHILS 1.30 1.60 1.00     Recent Labs     03/29/22  0903 03/30/22  0436   SODIUM 137 138   POTASSIUM 4.0 3.8   CHLORIDE 104 106   CO2 22 23   GLUCOSE 136* 100*   BUN 7* 8     Recent Labs     03/29/22  0903 03/30/22  0436   CREATININE 1.06 0.92       Imaging:  CT-DRAIN-PERITONEAL    Result Date: 3/22/2022  3/22/2022 4:31 PM HISTORY/REASON FOR EXAM:  Noted intra-abdominal fluid collection near bladder on outside CT scan. Presacral fluid collection. History of total colectomy. Right lower quadrant ileostomy. Intermittent small bowel obstruction. TECHNIQUE/EXAM DESCRIPTION: Pelvic (presacral/retroperitoneal) abscess drainage with CT guidance. Low dose optimization technique was utilized for this CT exam including automated exposure control and adjustment of the mA and/or kV according to patient size. PROCEDURE: Informed consent was obtained. Localizing CT images were obtained with the patient in prone position. The skin was prepped with ChloraPrep and draped in a sterile fashion. Moderate sedation was provided. Pulse oximetry and continuous cardiac monitoring by the nurse was performed throughout the exam. Intraservice time was 30 minutes. Following local anesthesia with 1% Lidocaine, a 17 G guiding needle was placed and needle path via a left transgluteal approach confirmed with CT. An Amplatz guidewire was placed and following serial tract  dilatation, a 8 Sami pigtail locking catheter was placed. A specimen was collected and submitted for culture and sensitivity and Gram stain. Total of 10 mL initially butterscotch colored fluid turning to pink opaque fluid was drained. The initial fluid returned resembled enteric contents. The catheter was secured to the skin and connected to suction bulb drainage. Final CT images were obtained documenting catheter position. The patient tolerated the procedure well with no evidence of complication. COMPARISON: CT of the abdomen and pelvis outside films 3/20/2022 FINDINGS: Initial CT images again show an approximately 3.7 cm presacral collection with an air-fluid level. The final CT images show satisfactory catheter position within the targeted presacral collection.     1.  CT GUIDED RETROPERITONEAL PELVIC/PRESACRAL CATHETER DRAINAGE. INITIAL FLUID RETURN RESEMBLED ENTERIC CONTENTS. A FISTULOUS LEAK MAY BE PRESENT. LIKEWISE, THE AIR CONTENT SUGGESTS THE POSSIBILITY OF COMMUNICATION WITH BOWEL. 2.  THE CURRENT PLAN IS TO MONITOR DRAINAGE OUTPUT, IRRIGATE DAILY AND OBTAIN A FOLLOWUP CT SCAN IN 5-7 DAYS IF CLINICALLY INDICATED. 3. ABSCESSOGRAM MAY BE OF INTEREST PRIOR TO CATHETER REMOVAL TO DETERMINE IF THERE IS FISTULOUS COMMUNICATION WITH SMALL BOWEL IN THE PELVIS.    CT-ABDOMEN&PELVIS ENTEROGRAPHY    Result Date: 3/25/2022  CT abdomen and pelvis with contrast 3/24/2022 1725 hours is HISTORY/REASON FOR EXAM:  Bowel obstruction suspected; Pelvic fluid collection - S/P IR drain placement.  Pt with Hx of total abdominal colectomy. possible small bowel fistula; Possible fistula to pelvic fluid collection TECHNIQUE/EXAM DESCRIPTION:  CT abdomen and pelvis with contrast, enterography protocol. Following oral administration of VoLumen oral contrast and water as well as intravenous administration of 100 mL of Omnipaque 350 nonionic contrast, thin section helical CT is performed from the level of the diaphragm through the  ischial tuberosities. Axial, coronal, and sagittal images are sent to PACS. Low dose optimization technique was utilized for this CT exam including automated exposure control and adjustment of the mA and/or kV according to patient size. COMPARISON:  Outside CT abdomen and pelvis 3/20/2022 FINDINGS: The study was ordered and performed as CT enterography which utilizes negative oral contrast. This precludes assessment of a fistula. We called the patient's caregivers recommending that the scan be repeated and that is not possible at this time therefore, the scan is being interpreted as performed. LUNGS: The lung bases are clear. Left ventricle is probably dilated. Osseous structures:  No significant finding. Abdomen: LIVER: is normal in appearance. GALLBLADDER and BILIARY SYSTEM The gallbladder is normal in CT appearance. There is no biliary dilation. SPLEEN: Normal in appearance. PANCREAS: The pancreas is normal in appearance. The splenic vein and portal vein enhance normally. ADRENAL glands: Normal in appearance. RIGHT kidney: Normal in appearance. LEFT kidney: Normal in appearance. There is no hydronephrosis. BOWEL and MESENTERY: There is a right-sided ileostomy. There has been a colectomy. There is a presacral fluid collection containing a catheter. Fluid collection measures 2.9 x 2.2 cm consistent with an abscess and may indicate leakage. AORTA and VASCULATURE: Atherosclerosis without aneurysm. Pelvis: There is possibly a rectal mass versus prostate enlargement.     1.  Unchanged presacral/precoccygeal space fluid collection measuring 2.9 x 2.2 cm consistent with abscess 2.  Prior colectomy and there is a right lower quadrant ostomy 3.  No direct evidence for fistula although cannot be excluded with the negative contrast administered for enterography. Consider repeat CT scan with standard oral contrast when clinically appropriate 4.  Postoperative changes in the presacral/precoccygeal space 5.  Mass contiguous  "with the inferior aspect of rectal stump which could be tumor versus enlarged prostate. 6.  No other significant finding      Micro:  Results     Procedure Component Value Units Date/Time    BLOOD CULTURE [[id number]] Collected: 03/25/22 1603    Order Status: Completed Specimen: Blood from Peripheral Updated: 03/26/22 0721     Significant Indicator NEG     Source BLD     Site PERIPHERAL     Culture Result No Growth  Note: Blood cultures are incubated for 5 days and  are monitored continuously.Positive blood cultures  are called to the RN and reported as soon as  they are identified.      Narrative:      Per Hospital Policy: Only change Specimen Src: to \"Line\" if  specified by physician order.  Left Hand    BLOOD CULTURE [[id number]] Collected: 03/25/22 1448    Order Status: Completed Specimen: Blood from Peripheral Updated: 03/26/22 0721     Significant Indicator NEG     Source BLD     Site PERIPHERAL     Culture Result No Growth  Note: Blood cultures are incubated for 5 days and  are monitored continuously.Positive blood cultures  are called to the RN and reported as soon as  they are identified.      Narrative:      Per Hospital Policy: Only change Specimen Src: to \"Line\" if  specified by physician order.  Left Hand    FLUID CULTURE W/GRAM STAIN [[id number]]  (Abnormal)  (Susceptibility) Collected: 03/22/22 1606    Order Status: Completed Specimen: Body Fluid Updated: 03/24/22 1252     Significant Indicator POS     Source BF     Site Peritoneal Fluid     Culture Result Light growth mixed skin andrei.     Gram Stain Result Moderate WBCs.  Few Gram negative rods.       Culture Result Escherichia coli  Light growth      Narrative:      order req#[id number]    Susceptibility     Escherichia coli (1)     Antibiotic Interpretation Microscan   Method Status    Ampicillin Sensitive <=8 mcg/mL PATTIE Final    Ceftriaxone Sensitive <=1 mcg/mL PATTIE Final    Cefazolin Sensitive <=2 mcg/mL PATTIE Final    Ciprofloxacin Sensitive <=0.25 " mcg/mL PATTIE Final    Cefepime Sensitive <=2 mcg/mL PATTIE Final    Cefuroxime Sensitive <=4 mcg/mL PATTIE Final    Ampicillin/sulbactam Sensitive <=4/2 mcg/mL PATTIE Final    Ertapenem Sensitive <=0.5 mcg/mL PATTIE Final    Tobramycin Sensitive <=2 mcg/mL PATTIE Final    Gentamicin Sensitive <=2 mcg/mL PATTIE Final    Minocycline Sensitive <=4 mcg/mL PATTIE Final    Moxifloxacin Sensitive <=2 mcg/mL PATTIE Final    Pip/Tazobactam Sensitive <=8 mcg/mL PATTIE Final    Trimeth/Sulfa Sensitive <=0.5/9.5 mcg/mL PATTIE Final    Tigecycline Sensitive <=2 mcg/mL PATTIE Final                         Assessment:  Active Hospital Problems    Diagnosis    • *Small bowel obstruction (HCC) [K56.609]    • Abdominal fluid collection [R18.8]    • Tobacco chew use [Z72.0]    • Leukocytosis [D72.829]    • Hypokalemia [E87.6]      Recurrent SBO  H/o recurrent colon cancer-rectal mass on CT  Fistula  Intraabd abscess-Ecoli-not improved on CT 3/25  Leukocytosis resolved        PLAN:    CT 3/25 : Unchanged presacral/precoccygeal space fluid collection measuring 2.9 x 2.2 cm consistent with abscess 2.  Prior colectomy and there is a right lower quadrant ostomy 3.  No direct evidence for fistula although cannot be excluded with the negative contrast administered for enterography. Consider repeat CT scan with standard oral contrast when clinically appropriate 4.  Postoperative changes in the presacral/precoccygeal space 5.  Mass contiguous with the inferior aspect of rectal stump which could be tumor versus enlarged prostate  Need for larger drain per IR. No current plan for surgery  CT 3/28 no obvious fistula-no residual fluid and scant drain output. Catheter abscessogram recommended    Abscessogram today shows fistula-plan per surgery is antibiotics, no surgery    As fistula present will require weeks of antibiotics + drain to promote healing  Continue Zosyn-reordered  Plan for PICC for extended course IV antibiotics to promote spontaneous closure  Orders sent to [facility]-Zosyn  thru 5/9/2022  Drain per surgery  Further eval rectal stump mass per PCT    FU ID after discharge

## 2022-03-30 NOTE — PROGRESS NOTES
Assumed care of patient at 0645. Bedside report received. Assessment complete.  AA&Ox4. Denies CP/SOB.  Reporting 8/10 pain. Medication given for pain control.  Educated patient regarding pharmacologic and non pharmacologic modalities for pain management.  Skin per flow sheets.  Tolerating diet. Denies N/V.  + void. Last BM 3/29/2022.  Pt ambulates x self.  Plan of care discussed, all questions answered. Educated on the importance of calling before getting OOB and pt verbalizes understanding. Educated regarding importance of oral care. Oral care kit at bedside. Call light is within reach, treaded slipper socks on, bed in lowest/ locked position, hourly rounding in place, all needs met at this time.    Pt currently in IR for abcess-o-gram.    Will continue to monitor.     Kathy Meza R.N.

## 2022-03-30 NOTE — PROGRESS NOTES
ID consult recommendations noted.  I have ordered an abscessogram for today. Even if he has a fistula, urgent surgery is not indicated at this point given his prior radiation and surgery.  Pending results, may need outpatient therapy and follow up with Dr. Garcia in his office.    Plan pending above.  DVT ppx and diet ok from my standpoint.    Paulie Chowdhury MD  [phone]\

## 2022-03-30 NOTE — CARE PLAN
Problem: Knowledge Deficit - Standard  Goal: Patient and family/care givers will demonstrate understanding of plan of care, disease process/condition, diagnostic tests and medications  Outcome: Progressing     Problem: Pain - Standard  Goal: Alleviation of pain or a reduction in pain to the patient’s comfort goal  Outcome: Progressing     Problem: Psychosocial  Goal: Patient's level of anxiety will decrease  Outcome: Progressing  Goal: Patient's ability to verbalize feelings about condition will improve  Outcome: Progressing  Goal: Patient's ability to re-evaluate and adapt role responsibilities will improve  Outcome: Progressing  Goal: Patient and family will demonstrate ability to cope with life altering diagnosis and/or procedure  Outcome: Progressing  Goal: Spiritual and cultural needs incorporated into hospitalization  Outcome: Progressing   The patient is Stable - Low risk of patient condition declining or worsening    Shift Goals  Clinical Goals: pain control, flush drains, monitor I&O  Patient Goals: pain control  Family Goals: N/A    Progress made toward(s) clinical / shift goals: Medications given for pain control. Drain flushed this AM per order. I&O as charted. Will continue to monitor.     Patient is not progressing towards the following goals: N/A    Kathy Meza R.N.

## 2022-03-30 NOTE — PROGRESS NOTES
Hospital Medicine Daily Progress Note    Date of Service  3/30/2022    Chief Complaint  Beau Mcintyre is a 51 y.o. male admitted 3/21/2022 with abdominal fluid collection.    Hospital Course  Admitted as a transfer for small bowel obstruction and abdominal fluid collection.  The small bowel obstruction spontaneously resolved.  For the abdominal fluid collection, surgery was consulted on the case.  Patient underwent CT-guided retroperitoneal pelvic/presacral catheter drainage on 3/22/2022 by interventional radiology.  He was started empiric coverage with IV Zosyn.  Fluid culture showed E. coli which was pansensitive.  Infectious disease was consulted on the case.     Interval Problem Update  Abdominal fluid collection - discussed case with IR  Fistula - noted on abscess-o-gram, discussed case with ID, surgery    I have personally seen and examined the patient at bedside. I discussed the plan of care with patient, bedside RN, charge RN,  and general surgery, infectious disease and Interventional radiology.    Consultants/Specialty  general surgery, infectious disease and Interventional radiology    Code Status  Full Code    Disposition  Patient is not medically cleared for discharge.   Anticipate discharge to to home with organized home healthcare and close outpatient follow-up.  I have placed the appropriate orders for post-discharge needs.    Review of Systems  Review of Systems   Constitutional: Negative for chills, diaphoresis, fever and malaise/fatigue.   HENT: Negative for congestion, hearing loss and sore throat.    Eyes: Negative for blurred vision.   Respiratory: Negative for cough, shortness of breath and wheezing.    Cardiovascular: Negative for chest pain, palpitations and leg swelling.   Gastrointestinal: Positive for abdominal pain. Negative for diarrhea, heartburn, nausea and vomiting.   Genitourinary: Negative for dysuria, flank pain and hematuria.   Musculoskeletal: Negative for back  pain, joint pain, myalgias and neck pain.   Skin: Negative for rash.   Neurological: Negative for dizziness, sensory change, speech change, focal weakness, weakness and headaches.   Psychiatric/Behavioral: The patient is not nervous/anxious.         Physical Exam  Temp:  [36.4 °C (97.6 °F)-37.1 °C (98.8 °F)] 36.5 °C (97.7 °F)  Pulse:  [60-71] 61  Resp:  [18] 18  BP: (100-117)/(58-74) 111/74  SpO2:  [92 %-97 %] 92 %    Physical Exam  Vitals and nursing note reviewed.   HENT:      Head: Normocephalic and atraumatic.      Nose: No congestion.      Mouth/Throat:      Mouth: Mucous membranes are moist.   Eyes:      Extraocular Movements: Extraocular movements intact.      Conjunctiva/sclera: Conjunctivae normal.   Cardiovascular:      Rate and Rhythm: Normal rate and regular rhythm.   Pulmonary:      Effort: Pulmonary effort is normal.      Breath sounds: Normal breath sounds.   Abdominal:      General: There is no distension.      Tenderness: There is abdominal tenderness (mild, pelvic area). There is no guarding or rebound.      Comments: RLQ ostomy  Left gluteal drain   Musculoskeletal:      Cervical back: No tenderness.      Right lower leg: No edema.      Left lower leg: No edema.   Skin:     General: Skin is warm and dry.   Neurological:      General: No focal deficit present.      Mental Status: He is alert and oriented to person, place, and time.      Cranial Nerves: No cranial nerve deficit.         Fluids    Intake/Output Summary (Last 24 hours) at 3/30/2022 1605  Last data filed at 3/30/2022 1400  Gross per 24 hour   Intake 380 ml   Output 8 ml   Net 372 ml       Laboratory  Recent Labs     03/28/22  0215 03/29/22  0903 03/30/22  0436   WBC 5.3 5.0 5.2   RBC 4.33* 4.19* 4.17*   HEMOGLOBIN 13.4* 13.2* 12.9*   HEMATOCRIT 39.3* 38.5* 38.9*   MCV 90.8 91.9 93.3   MCH 30.9 31.5 30.9   MCHC 34.1 34.3 33.2*   RDW 43.4 44.5 44.9   PLATELETCT 360 318 312   MPV 9.1 9.1 9.0     Recent Labs     03/29/22  0903  03/30/22  0436   SODIUM 137 138   POTASSIUM 4.0 3.8   CHLORIDE 104 106   CO2 22 23   GLUCOSE 136* 100*   BUN 7* 8   CREATININE 1.06 0.92   CALCIUM 8.5 8.3*                   Imaging  [id number]   Final Result      1.  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      [id number] VIEW   Final Result      1.  Pigtail drainage catheter in place within the pelvis.   2.  Nonobstructive bowel gas pattern.      CT-PELVIS WITH   Final Result      1.  Right lower quadrant ileostomy.   2.  Left transgluteal posterior pelvic/presacral pigtail drainage catheter remains in place. No residual fluid collection evident about the pigtail loop.   3.  Enteric contrast opacifying pelvic small bowel loops. No appreciable communication of contrast into the presacral collection.   4.  Catheter abscessogram may be of interest if there is strong clinical suspicion for a fistula.      CT-ABDOMEN-PELVIS WITH   Final Result         1. No contrast is seen within the presacral fluid collection to suggest fistulous connection.      CT-ABDOMEN&PELVIS ENTEROGRAPHY   Final Result      1.  Unchanged presacral/precoccygeal space fluid collection measuring 2.9 x 2.2 cm consistent with abscess      2.  Prior colectomy and there is a right lower quadrant ostomy      3.  No direct evidence for fistula although cannot be excluded with the negative contrast administered for enterography. Consider repeat CT scan with standard oral contrast when clinically appropriate      4.  Postoperative changes in the presacral/precoccygeal space      5.  Mass contiguous with the inferior aspect of rectal stump which could be tumor versus enlarged prostate.      6.  No other significant finding      CT-DRAIN-PERITONEAL   Final Result      1.  CT GUIDED RETROPERITONEAL PELVIC/PRESACRAL CATHETER DRAINAGE. INITIAL FLUID RETURN RESEMBLED ENTERIC CONTENTS. A FISTULOUS LEAK MAY BE PRESENT. LIKEWISE, THE AIR CONTENT SUGGESTS THE POSSIBILITY OF COMMUNICATION  WITH BOWEL.   2.  THE CURRENT PLAN IS TO MONITOR DRAINAGE OUTPUT, IRRIGATE DAILY AND OBTAIN A FOLLOWUP CT SCAN IN 5-7 DAYS IF CLINICALLY INDICATED.   3. ABSCESSOGRAM MAY BE OF INTEREST PRIOR TO CATHETER REMOVAL TO DETERMINE IF THERE IS FISTULOUS COMMUNICATION WITH SMALL BOWEL IN THE PELVIS.      OUTSIDE IMAGES-CT ABDOMEN /PELVIS   Final Result      IR-PICC LINE PLACEMENT W/ GUIDANCE > AGE 5    (Results Pending)        Assessment/Plan  * Abdominal fluid collection- (present on admission)  Assessment & Plan  CT-guided retroperitoneal pelvic/presacral catheter drainage 3/22/2022  ID recommendation - IV Zosyn until 5/9/2022  Drain management per Interventional radiology/Surgery  For PICC line placement      Fistula- (present on admission)  Assessment & Plan  Follow up with Colorectal surgery as outpatient    SBO (small bowel obstruction) (HCC)- (present on admission)  Assessment & Plan  Resolved    Hypomagnesemia- (present on admission)  Assessment & Plan  oral Mg  Follow level    Rectal mass- (present on admission)  Assessment & Plan  History of colorectal cancer  Follow-up with colorectal surgery as outpatient    Hypokalemia- (present on admission)  Assessment & Plan  Follow BMP    Tobacco chew use- (present on admission)  Assessment & Plan  Education and counseling       VTE prophylaxis: enoxaparin ppx    I have performed a physical exam and reviewed and updated ROS and Plan today (3/30/2022). In review of yesterday's note (3/29/2022), there are no changes except as documented above.

## 2022-03-30 NOTE — PROGRESS NOTES
"Radiology Progress Note   Author: NGHIA Perea Date & Time created: 3/30/2022  12:15 PM   Date of admission  3/21/2022  Note to reader: this note follows the APSO format rather than the historical SOAP format. Assessment and plan located at the top of the note for ease of use.    Chief Complaint  51 y.o. male admitted 3/21/2022 with abd pain     HPI  \" Beau Mcintyre is a 51 y.o. male admitted 3/21/2022 with h/o colorectal cancer and multiple surgeries with end ileostomy and recurrent SBO's presents from outside hospital with recurrent SBO and possible posterior bladder intra abdominal fluid collection\"      Assessment/Plan  Interval History   Principal Problem:    Abdominal fluid collection  Active Problems:    SBO (small bowel obstruction) (HCC)    Hypokalemia    Tobacco chew use    Rectal mass    Hypomagnesemia      Plan IR  - Irrigate Left flank drain with 10 ml of sterile saline twice per shift   - Fluid cultures + Escherichia coli  - Surgery and ID following   - Antibiotic per ID   - Continue to monitor drains, VS and labs   - Small bowel fistula, 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 Pending surgical eval         IR:   3/22- Left posterior pelvic drain placed, 270 ml output    3/23-  65 ml in am of bloody brown drainage. Leukocytosis resolved 7.4, CO discomfort at drain site. Drain site CDI, no redness or swelling  3/24-  WBC 6.2, 100 ml brown fluid output.   3/25- CT scan from today show abscess unchanged presacral/precoccygeal space fluid collection measuring 2.9 x 2.2 cm. No direct evidence for fistula although cannot be excluded with the negative contrast administered for enterography.    3/28- CT shows no residual fluid collection evident about the pigtail loop. Enteric contrast opacifying pelvic small bowel loops. No appreciable communication of contrast into the presacral collection. 20 ml of brown fluid collection   3/29 - 20 ml of brown, WBC WNL 5.0   3/30- Abscess o gram shows small bowel " fistula          Review of Systems  Physical Exam   Review of Systems   Constitutional: Positive for malaise/fatigue. Negative for chills and fever.   HENT: Negative for hearing loss.    Eyes: Negative for blurred vision.   Respiratory: Negative for cough, hemoptysis and shortness of breath.    Cardiovascular: Negative for chest pain and palpitations.   Gastrointestinal: Negative for abdominal pain and vomiting.        Colostomy    Genitourinary: Negative for dysuria.   Musculoskeletal: Negative for back pain and myalgias.        Discomfort at drain site    Skin: Negative for rash.   Neurological: Negative for dizziness, weakness and headaches.   Endo/Heme/Allergies: Does not bruise/bleed easily.   Psychiatric/Behavioral: Negative for suicidal ideas.      Vitals:    03/30/22 1037   BP: 110/73   Pulse: 71   Resp: 18   Temp: 36.9 °C (98.5 °F)   SpO2: 92%        Physical Exam  Constitutional:       Appearance: Normal appearance.   HENT:      Head: Normocephalic.      Nose: Nose normal.      Mouth/Throat:      Mouth: Mucous membranes are moist.   Eyes:      Pupils: Pupils are equal, round, and reactive to light.   Cardiovascular:      Rate and Rhythm: Normal rate.   Pulmonary:      Effort: Pulmonary effort is normal. No respiratory distress.   Abdominal:      Palpations: Abdomen is soft.      Tenderness: There is no abdominal tenderness.      Comments: Colostomy    Musculoskeletal:         General: No tenderness or deformity.      Cervical back: Normal range of motion.        Back:    Skin:     General: Skin is warm and dry.      Capillary Refill: Capillary refill takes less than 2 seconds.      Coloration: Skin is not jaundiced or pale.   Neurological:      General: No focal deficit present.      Mental Status: He is alert.      Motor: No weakness.   Psychiatric:         Mood and Affect: Mood normal.         Behavior: Behavior normal.             Labs    Recent Labs     03/28/22  0215 03/29/22  0903 03/30/22  0436    WBC 5.3 5.0 5.2   RBC 4.33* 4.19* 4.17*   HEMOGLOBIN 13.4* 13.2* 12.9*   HEMATOCRIT 39.3* 38.5* 38.9*   MCV 90.8 91.9 93.3   MCH 30.9 31.5 30.9   MCHC 34.1 34.3 33.2*   RDW 43.4 44.5 44.9   PLATELETCT 360 318 312   MPV 9.1 9.1 9.0     Recent Labs     03/29/22  0903 03/30/22  0436   SODIUM 137 138   POTASSIUM 4.0 3.8   CHLORIDE 104 106   CO2 22 23   GLUCOSE 136* 100*   BUN 7* 8   CREATININE 1.06 0.92   CALCIUM 8.5 8.3*     Recent Labs     03/29/22  0903 03/30/22  0436   CREATININE 1.06 0.92     [id number]   Final Result      1.  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      [id number] VIEW   Final Result      1.  Pigtail drainage catheter in place within the pelvis.   2.  Nonobstructive bowel gas pattern.      CT-PELVIS WITH   Final Result      1.  Right lower quadrant ileostomy.   2.  Left transgluteal posterior pelvic/presacral pigtail drainage catheter remains in place. No residual fluid collection evident about the pigtail loop.   3.  Enteric contrast opacifying pelvic small bowel loops. No appreciable communication of contrast into the presacral collection.   4.  Catheter abscessogram may be of interest if there is strong clinical suspicion for a fistula.      CT-ABDOMEN-PELVIS WITH   Final Result         1. No contrast is seen within the presacral fluid collection to suggest fistulous connection.      CT-ABDOMEN&PELVIS ENTEROGRAPHY   Final Result      1.  Unchanged presacral/precoccygeal space fluid collection measuring 2.9 x 2.2 cm consistent with abscess      2.  Prior colectomy and there is a right lower quadrant ostomy      3.  No direct evidence for fistula although cannot be excluded with the negative contrast administered for enterography. Consider repeat CT scan with standard oral contrast when clinically appropriate      4.  Postoperative changes in the presacral/precoccygeal space      5.  Mass contiguous with the inferior aspect of rectal stump which could be tumor versus  enlarged prostate.      6.  No other significant finding      CT-DRAIN-PERITONEAL   Final Result      1.  CT GUIDED RETROPERITONEAL PELVIC/PRESACRAL CATHETER DRAINAGE. INITIAL FLUID RETURN RESEMBLED ENTERIC CONTENTS. A FISTULOUS LEAK MAY BE PRESENT. LIKEWISE, THE AIR CONTENT SUGGESTS THE POSSIBILITY OF COMMUNICATION WITH BOWEL.   2.  THE CURRENT PLAN IS TO MONITOR DRAINAGE OUTPUT, IRRIGATE DAILY AND OBTAIN A FOLLOWUP CT SCAN IN 5-7 DAYS IF CLINICALLY INDICATED.   3. ABSCESSOGRAM MAY BE OF INTEREST PRIOR TO CATHETER REMOVAL TO DETERMINE IF THERE IS FISTULOUS COMMUNICATION WITH SMALL BOWEL IN THE PELVIS.      OUTSIDE IMAGES-CT ABDOMEN /PELVIS   Final Result          INR   Date Value Ref Range Status   03/21/2022 1.05 0.87 - 1.13 Final     Comment:     INR - Non-therapeutic Reference Range: 0.87-1.13  INR - Therapeutic Reference Range: 2.0-4.0       No results found for: POCINR     Intake/Output Summary (Last 24 hours) at 3/23/2022 1341  Last data filed at 3/23/2022 1147  Gross per 24 hour   Intake 633.75 ml   Output 2610 ml   Net -1976.25 ml      Labs not explicitly included in this progress note were reviewed by the author. Radiology/imaging not explicitly included in this progress note was reviewed by the author.     I have performed a physical exam and reviewed and updated ROS and Plan today (3/30/2022).     20 minutes in directly providing and coordinating care and extensive data review.  No time overlap and excludes procedures.

## 2022-03-30 NOTE — FACE TO FACE
Face to Face Supporting Documentation - Home Health    The encounter with this patient was in whole or in part the primary reason for home health admission.    Date of encounter:   Patient:                    MRN:                       YOB: 2021  Beau Mcintyre  [id number]  1970     Home health to see patient for:  Skilled Nursing care for assessment, interventions & education and Wound Care    Skilled need for:  Surgical Aftercare Abdominal abscess    Skilled nursing interventions to include:  Venous access care, Home IV infusion therapy and Wound Care    Homebound status evidenced by:  Needs the assistance of another person in order to leave the home. Leaving home requires a considerable and taxing effort. There is a normal inability to leave the home.    Community Physician to provide follow up care: Pcp Pt States None     Optional Interventions? No      I certify the face to face encounter for this home health care referral meets the CMS requirements and the encounter/clinical assessment with the patient was, in whole, or in part, for the medical condition(s) listed above, which is the primary reason for home health care. Based on my clinical findings: the service(s) are medically necessary, support the need for home health care, and the homebound criteria are met.  I certify that this patient has had a face to face encounter by myself.  Javi Yates M.D. - NPI: [id number]

## 2022-03-31 ENCOUNTER — APPOINTMENT (OUTPATIENT)
Dept: RADIOLOGY | Facility: MEDICAL CENTER | Age: 52
DRG: 372 | End: 2022-03-31
Attending: INTERNAL MEDICINE
Payer: COMMERCIAL

## 2022-03-31 LAB
ANION GAP SERPL CALC-SCNC: 12 MMOL/L (ref 7–16)
BASOPHILS # BLD AUTO: 1.3 % (ref 0–1.8)
BASOPHILS # BLD: 0.07 K/UL (ref 0–0.12)
BUN SERPL-MCNC: 6 MG/DL (ref 8–22)
CALCIUM SERPL-MCNC: 8.7 MG/DL (ref 8.5–10.5)
CHLORIDE SERPL-SCNC: 106 MMOL/L (ref 96–112)
CO2 SERPL-SCNC: 19 MMOL/L (ref 20–33)
CREAT SERPL-MCNC: 0.84 MG/DL (ref 0.5–1.4)
EOSINOPHIL # BLD AUTO: 0.12 K/UL (ref 0–0.51)
EOSINOPHIL NFR BLD: 2.3 % (ref 0–6.9)
ERYTHROCYTE [DISTWIDTH] IN BLOOD BY AUTOMATED COUNT: 43.5 FL (ref 35.9–50)
GFR SERPLBLD CREATININE-BSD FMLA CKD-EPI: 106 ML/MIN/1.73 M 2
GLUCOSE SERPL-MCNC: 87 MG/DL (ref 65–99)
HCT VFR BLD AUTO: 39.3 % (ref 42–52)
HGB BLD-MCNC: 13.3 G/DL (ref 14–18)
IMM GRANULOCYTES # BLD AUTO: 0.02 K/UL (ref 0–0.11)
IMM GRANULOCYTES NFR BLD AUTO: 0.4 % (ref 0–0.9)
LYMPHOCYTES # BLD AUTO: 2.12 K/UL (ref 1–4.8)
LYMPHOCYTES NFR BLD: 40.1 % (ref 22–41)
MAGNESIUM SERPL-MCNC: 2 MG/DL (ref 1.5–2.5)
MCH RBC QN AUTO: 30.9 PG (ref 27–33)
MCHC RBC AUTO-ENTMCNC: 33.8 G/DL (ref 33.7–35.3)
MCV RBC AUTO: 91.2 FL (ref 81.4–97.8)
MONOCYTES # BLD AUTO: 0.39 K/UL (ref 0–0.85)
MONOCYTES NFR BLD AUTO: 7.4 % (ref 0–13.4)
NEUTROPHILS # BLD AUTO: 2.57 K/UL (ref 1.82–7.42)
NEUTROPHILS NFR BLD: 48.5 % (ref 44–72)
NRBC # BLD AUTO: 0 K/UL
NRBC BLD-RTO: 0 /100 WBC
PLATELET # BLD AUTO: 298 K/UL (ref 164–446)
PMV BLD AUTO: 9.1 FL (ref 9–12.9)
POTASSIUM SERPL-SCNC: 4 MMOL/L (ref 3.6–5.5)
RBC # BLD AUTO: 4.31 M/UL (ref 4.7–6.1)
SODIUM SERPL-SCNC: 137 MMOL/L (ref 135–145)
WBC # BLD AUTO: 5.3 K/UL (ref 4.8–10.8)

## 2022-03-31 PROCEDURE — 700102 HCHG RX REV CODE 250 W/ 637 OVERRIDE(OP): Performed by: HOSPITALIST

## 2022-03-31 PROCEDURE — 700111 HCHG RX REV CODE 636 W/ 250 OVERRIDE (IP): Performed by: HOSPITALIST

## 2022-03-31 PROCEDURE — 83735 ASSAY OF MAGNESIUM: CPT

## 2022-03-31 PROCEDURE — 99233 SBSQ HOSP IP/OBS HIGH 50: CPT | Performed by: INTERNAL MEDICINE

## 2022-03-31 PROCEDURE — A9270 NON-COVERED ITEM OR SERVICE: HCPCS | Performed by: FAMILY MEDICINE

## 2022-03-31 PROCEDURE — 770001 HCHG ROOM/CARE - MED/SURG/GYN PRIV*

## 2022-03-31 PROCEDURE — 700105 HCHG RX REV CODE 258: Performed by: INTERNAL MEDICINE

## 2022-03-31 PROCEDURE — 700102 HCHG RX REV CODE 250 W/ 637 OVERRIDE(OP): Performed by: FAMILY MEDICINE

## 2022-03-31 PROCEDURE — 700111 HCHG RX REV CODE 636 W/ 250 OVERRIDE (IP): Performed by: INTERNAL MEDICINE

## 2022-03-31 PROCEDURE — 36415 COLL VENOUS BLD VENIPUNCTURE: CPT

## 2022-03-31 PROCEDURE — A9270 NON-COVERED ITEM OR SERVICE: HCPCS | Performed by: HOSPITALIST

## 2022-03-31 PROCEDURE — 80048 BASIC METABOLIC PNL TOTAL CA: CPT

## 2022-03-31 PROCEDURE — 85025 COMPLETE CBC W/AUTO DIFF WBC: CPT

## 2022-03-31 PROCEDURE — 36573 INSJ PICC RS&I 5 YR+: CPT

## 2022-03-31 PROCEDURE — 02HV33Z INSERTION OF INFUSION DEVICE INTO SUPERIOR VENA CAVA, PERCUTANEOUS APPROACH: ICD-10-PCS | Performed by: INTERNAL MEDICINE

## 2022-03-31 PROCEDURE — 99024 POSTOP FOLLOW-UP VISIT: CPT | Performed by: SURGERY

## 2022-03-31 PROCEDURE — 99232 SBSQ HOSP IP/OBS MODERATE 35: CPT | Performed by: FAMILY MEDICINE

## 2022-03-31 RX ADMIN — MORPHINE SULFATE 2 MG: 4 INJECTION INTRAVENOUS at 06:26

## 2022-03-31 RX ADMIN — PIPERACILLIN AND TAZOBACTAM 3.38 G: 3; .375 INJECTION, POWDER, LYOPHILIZED, FOR SOLUTION INTRAVENOUS; PARENTERAL at 12:30

## 2022-03-31 RX ADMIN — DIPHENHYDRAMINE HYDROCHLORIDE 25 MG: 25 TABLET ORAL at 03:08

## 2022-03-31 RX ADMIN — ENOXAPARIN SODIUM 40 MG: 40 INJECTION SUBCUTANEOUS at 14:42

## 2022-03-31 RX ADMIN — SENNOSIDES AND DOCUSATE SODIUM 2 TABLET: 50; 8.6 TABLET ORAL at 06:26

## 2022-03-31 RX ADMIN — HYDROCODONE BITARTRATE AND ACETAMINOPHEN 1 TABLET: 5; 325 TABLET ORAL at 17:21

## 2022-03-31 RX ADMIN — Medication 400 MG: at 17:21

## 2022-03-31 RX ADMIN — PIPERACILLIN AND TAZOBACTAM 3.38 G: 3; .375 INJECTION, POWDER, LYOPHILIZED, FOR SOLUTION INTRAVENOUS; PARENTERAL at 20:30

## 2022-03-31 RX ADMIN — HYDROCODONE BITARTRATE AND ACETAMINOPHEN 1 TABLET: 5; 325 TABLET ORAL at 09:33

## 2022-03-31 RX ADMIN — MORPHINE SULFATE 2 MG: 4 INJECTION INTRAVENOUS at 20:29

## 2022-03-31 RX ADMIN — PIPERACILLIN AND TAZOBACTAM 3.38 G: 3; .375 INJECTION, POWDER, LYOPHILIZED, FOR SOLUTION INTRAVENOUS; PARENTERAL at 06:25

## 2022-03-31 RX ADMIN — HYDROCODONE BITARTRATE AND ACETAMINOPHEN 1 TABLET: 5; 325 TABLET ORAL at 03:08

## 2022-03-31 RX ADMIN — MORPHINE SULFATE 2 MG: 4 INJECTION INTRAVENOUS at 12:27

## 2022-03-31 RX ADMIN — Medication 400 MG: at 06:26

## 2022-03-31 ASSESSMENT — ENCOUNTER SYMPTOMS
NAUSEA: 0
WHEEZING: 0
GASTROINTESTINAL NEGATIVE: 1
COUGH: 0
ABDOMINAL PAIN: 1
HEARTBURN: 0
RESPIRATORY NEGATIVE: 1
PSYCHIATRIC NEGATIVE: 1
ROS GI COMMENTS: COLOSTOMY
SPEECH CHANGE: 0
HEMOPTYSIS: 0
HEADACHES: 0
MYALGIAS: 0
ABDOMINAL PAIN: 0
WEAKNESS: 0
PALPITATIONS: 0
ROS GI COMMENTS: PAIN CONTROLLED
FOCAL WEAKNESS: 0
NEUROLOGICAL NEGATIVE: 1
SHORTNESS OF BREATH: 0
DIARRHEA: 0
CARDIOVASCULAR NEGATIVE: 1
CHILLS: 0
CONSTITUTIONAL NEGATIVE: 1
NERVOUS/ANXIOUS: 0
DIAPHORESIS: 0
BLURRED VISION: 0
VOMITING: 0
BRUISES/BLEEDS EASILY: 0
EYES NEGATIVE: 1
DIZZINESS: 0
SENSORY CHANGE: 0
MUSCULOSKELETAL NEGATIVE: 1
NECK PAIN: 0
BACK PAIN: 0
FEVER: 0
FLANK PAIN: 0
DOUBLE VISION: 0
SORE THROAT: 0

## 2022-03-31 ASSESSMENT — PAIN DESCRIPTION - PAIN TYPE
TYPE: ACUTE PAIN

## 2022-03-31 NOTE — DISCHARGE PLANNING
Agency/Facility Name: [facility]  Spoke To: Admissions  Outcome: Per admissions, they are just waiting for a response from the PCP and then they can move forward with the approval process.  RN CM notified

## 2022-03-31 NOTE — PROGRESS NOTES
"Radiology Progress Note   Author: NGHIA Perea Date & Time created: 3/31/2022  11:52 AM   Date of admission  3/21/2022  Note to reader: this note follows the APSO format rather than the historical SOAP format. Assessment and plan located at the top of the note for ease of use.    Chief Complaint  51 y.o. male admitted 3/21/2022 with abd pain     HPI  \" Beau Mcintyre is a 51 y.o. male admitted 3/21/2022 with h/o colorectal cancer and multiple surgeries with end ileostomy and recurrent SBO's presents from outside hospital with recurrent SBO and possible posterior bladder intra abdominal fluid collection\"      Assessment/Plan  Interval History   Principal Problem:    Abdominal fluid collection  Active Problems:    SBO (small bowel obstruction) (HCC)    Hypokalemia    Tobacco chew use    Rectal mass    Hypomagnesemia    Fistula      Plan IR  - Irrigate Left flank drain with 10 ml of sterile saline twice per shift   - Fluid cultures + Escherichia coli  - Surgery and ID following   - Antibiotic per ID   - Continue to monitor drains, VS and labs   - Small bowel fistula, 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 Pending surgical eval   - Follow up with  office   - Drain education provided to patient   - Educate patient on how to flush drain once per day, dressing change very 5-7 days or PRN  - Provided patient with enough dressing changes for 3 weeks   - Provided patient with enough Sterile saline supplies to flush once per day for 3 weeks   - Educate patient on S/S of infection   - Patient to keep log of drainage output   - He is to follow up with [facility] office for drain management     - Thank you for allowing Interventional Radiology team to participate in the patients care, if any additional care or requests are needed in the future please do not hesitate call or place IR order. IR signing off          IR:   3/22- Left posterior pelvic drain placed, 270 ml output    3/23-  65 ml in am of bloody brown " drainage. Leukocytosis resolved 7.4, CO discomfort at drain site. Drain site CDI, no redness or swelling  3/24-  WBC 6.2, 100 ml brown fluid output.   3/25- CT scan from today show abscess unchanged presacral/precoccygeal space fluid collection measuring 2.9 x 2.2 cm. No direct evidence for fistula although cannot be excluded with the negative contrast administered for enterography.    3/28- CT shows no residual fluid collection evident about the pigtail loop. Enteric contrast opacifying pelvic small bowel loops. No appreciable communication of contrast into the presacral collection. 20 ml of brown fluid collection   3/29 - 20 ml of brown, WBC WNL 5.0   3/30- Abscess o gram shows small bowel fistula   3/31- Brown drainage output, WBC WNL 5.3        Review of Systems  Physical Exam   Review of Systems   Constitutional: Positive for malaise/fatigue. Negative for chills and fever.   HENT: Negative for hearing loss.    Eyes: Negative for blurred vision.   Respiratory: Negative for cough, hemoptysis and shortness of breath.    Cardiovascular: Negative for chest pain and palpitations.   Gastrointestinal: Negative for abdominal pain and vomiting.        Colostomy    Genitourinary: Negative for dysuria.   Musculoskeletal: Negative for back pain and myalgias.        Discomfort at drain site    Skin: Negative for rash.   Neurological: Negative for dizziness, weakness and headaches.   Endo/Heme/Allergies: Does not bruise/bleed easily.   Psychiatric/Behavioral: Negative for suicidal ideas.      Vitals:    03/31/22 0755   BP: 105/69   Pulse: 68   Resp: 17   Temp: 36.5 °C (97.7 °F)   SpO2: 97%        Physical Exam  Constitutional:       Appearance: Normal appearance.   HENT:      Head: Normocephalic.      Nose: Nose normal.      Mouth/Throat:      Mouth: Mucous membranes are moist.   Eyes:      Pupils: Pupils are equal, round, and reactive to light.   Cardiovascular:      Rate and Rhythm: Normal rate.   Pulmonary:      Effort:  Pulmonary effort is normal. No respiratory distress.   Abdominal:      Palpations: Abdomen is soft.      Tenderness: There is no abdominal tenderness.      Comments: Colostomy    Musculoskeletal:         General: No tenderness or deformity.      Cervical back: Normal range of motion.        Back:    Skin:     General: Skin is warm and dry.      Capillary Refill: Capillary refill takes less than 2 seconds.      Coloration: Skin is not jaundiced or pale.   Neurological:      General: No focal deficit present.      Mental Status: He is alert.      Motor: No weakness.   Psychiatric:         Mood and Affect: Mood normal.         Behavior: Behavior normal.             Labs    Recent Labs     03/29/22  0903 03/30/22  0436 03/31/22 0317   WBC 5.0 5.2 5.3   RBC 4.19* 4.17* 4.31*   HEMOGLOBIN 13.2* 12.9* 13.3*   HEMATOCRIT 38.5* 38.9* 39.3*   MCV 91.9 93.3 91.2   MCH 31.5 30.9 30.9   MCHC 34.3 33.2* 33.8   RDW 44.5 44.9 43.5   PLATELETCT 318 312 298   MPV 9.1 9.0 9.1     Recent Labs     03/29/22  0903 03/30/22  0436 03/31/22 0317   SODIUM 137 138 137   POTASSIUM 4.0 3.8 4.0   CHLORIDE 104 106 106   CO2 22 23 19*   GLUCOSE 136* 100* 87   BUN 7* 8 6*   CREATININE 1.06 0.92 0.84   CALCIUM 8.5 8.3* 8.7     Recent Labs     03/29/22  0903 03/30/22 0436 03/31/22 0317   CREATININE 1.06 0.92 0.84     [id number]   Final Result      1.  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      [id number] VIEW   Final Result      1.  Pigtail drainage catheter in place within the pelvis.   2.  Nonobstructive bowel gas pattern.      CT-PELVIS WITH   Final Result      1.  Right lower quadrant ileostomy.   2.  Left transgluteal posterior pelvic/presacral pigtail drainage catheter remains in place. No residual fluid collection evident about the pigtail loop.   3.  Enteric contrast opacifying pelvic small bowel loops. No appreciable communication of contrast into the presacral collection.   4.  Catheter abscessogram may be of  interest if there is strong clinical suspicion for a fistula.      CT-ABDOMEN-PELVIS WITH   Final Result         1. No contrast is seen within the presacral fluid collection to suggest fistulous connection.      CT-ABDOMEN&PELVIS ENTEROGRAPHY   Final Result      1.  Unchanged presacral/precoccygeal space fluid collection measuring 2.9 x 2.2 cm consistent with abscess      2.  Prior colectomy and there is a right lower quadrant ostomy      3.  No direct evidence for fistula although cannot be excluded with the negative contrast administered for enterography. Consider repeat CT scan with standard oral contrast when clinically appropriate      4.  Postoperative changes in the presacral/precoccygeal space      5.  Mass contiguous with the inferior aspect of rectal stump which could be tumor versus enlarged prostate.      6.  No other significant finding      CT-DRAIN-PERITONEAL   Final Result      1.  CT GUIDED RETROPERITONEAL PELVIC/PRESACRAL CATHETER DRAINAGE. INITIAL FLUID RETURN RESEMBLED ENTERIC CONTENTS. A FISTULOUS LEAK MAY BE PRESENT. LIKEWISE, THE AIR CONTENT SUGGESTS THE POSSIBILITY OF COMMUNICATION WITH BOWEL.   2.  THE CURRENT PLAN IS TO MONITOR DRAINAGE OUTPUT, IRRIGATE DAILY AND OBTAIN A FOLLOWUP CT SCAN IN 5-7 DAYS IF CLINICALLY INDICATED.   3. ABSCESSOGRAM MAY BE OF INTEREST PRIOR TO CATHETER REMOVAL TO DETERMINE IF THERE IS FISTULOUS COMMUNICATION WITH SMALL BOWEL IN THE PELVIS.      OUTSIDE IMAGES-CT ABDOMEN /PELVIS   Final Result      IR-PICC LINE PLACEMENT W/ GUIDANCE > AGE 5    (Results Pending)       INR   Date Value Ref Range Status   03/21/2022 1.05 0.87 - 1.13 Final     Comment:     INR - Non-therapeutic Reference Range: 0.87-1.13  INR - Therapeutic Reference Range: 2.0-4.0       No results found for: POCINR     Intake/Output Summary (Last 24 hours) at 3/23/2022 1341  Last data filed at 3/23/2022 1147  Gross per 24 hour   Intake 633.75 ml   Output 2610 ml   Net -1976.25 ml      Labs not  explicitly included in this progress note were reviewed by the author. Radiology/imaging not explicitly included in this progress note was reviewed by the author.     I have performed a physical exam and reviewed and updated ROS and Plan today (3/31/2022).     20 minutes in directly providing and coordinating care and extensive data review.  No time overlap and excludes procedures.

## 2022-03-31 NOTE — PROGRESS NOTES
Received report from day shift RN. Assumed patient care.    Patient is A+O x4.  Tolerating regular diet. Denies N/V.  Denies chest pain or SOB.  Pain 7 on a 0-10 pain scale. Medicated per MAR.  + void, RLQ ileostomy with output.  Patient ambulates up self.     Plan of care discussed, all questions answered. Educated on the importance of calling before getting OOB and pt verbalizes understanding. Educated regarding importance of oral care. Oral care kit at bedside. Call light is within reach, treaded slipper socks on, bed in lowest/ locked position, hourly rounding in place, all needs met at this time

## 2022-03-31 NOTE — PROGRESS NOTES
Hospital Medicine Daily Progress Note    Date of Service  3/31/2022    Chief Complaint  Beau Mcintyre is a 51 y.o. male admitted 3/21/2022 with abdominal fluid collection.    Hospital Course  Admitted as a transfer for small bowel obstruction and abdominal fluid collection.  The small bowel obstruction spontaneously resolved.  For the abdominal fluid collection, surgery was consulted on the case.  Patient underwent CT-guided retroperitoneal pelvic/presacral catheter drainage on 3/22/2022 by interventional radiology.  He was started empiric coverage with IV Zosyn.  Fluid culture showed E. coli which was pansensitive.  Infectious disease was consulted on the case.     Interval Problem Update  Abdominal fluid collection - discussed case with IR  Fistula - noted on abscess-o-gram,    I have personally seen and examined the patient at bedside. I discussed the plan of care with patient, bedside RN, charge RN,  and Interventional radiology.    Consultants/Specialty  general surgery, infectious disease and Interventional radiology    Code Status  Full Code    Disposition  Patient is medically cleared for discharge.   Anticipate discharge to to home with organized home healthcare and close outpatient follow-up.  I have placed the appropriate orders for post-discharge needs.    Review of Systems  Review of Systems   Constitutional: Negative for chills, diaphoresis, fever and malaise/fatigue.   HENT: Negative for congestion, hearing loss and sore throat.    Eyes: Negative for blurred vision.   Respiratory: Negative for cough, shortness of breath and wheezing.    Cardiovascular: Negative for chest pain, palpitations and leg swelling.   Gastrointestinal: Positive for abdominal pain. Negative for diarrhea, heartburn, nausea and vomiting.   Genitourinary: Negative for dysuria, flank pain and hematuria.   Musculoskeletal: Negative for back pain, joint pain, myalgias and neck pain.   Skin: Negative for rash.    Neurological: Negative for dizziness, sensory change, speech change, focal weakness, weakness and headaches.   Psychiatric/Behavioral: The patient is not nervous/anxious.         Physical Exam  Temp:  [36.3 °C (97.4 °F)-36.5 °C (97.7 °F)] 36.5 °C (97.7 °F)  Pulse:  [60-74] 68  Resp:  [17-18] 17  BP: (100-113)/(67-75) 105/69  SpO2:  [92 %-98 %] 97 %    Physical Exam  Vitals and nursing note reviewed.   HENT:      Head: Normocephalic and atraumatic.      Nose: No congestion.      Mouth/Throat:      Mouth: Mucous membranes are moist.   Eyes:      Extraocular Movements: Extraocular movements intact.      Conjunctiva/sclera: Conjunctivae normal.   Cardiovascular:      Rate and Rhythm: Normal rate and regular rhythm.   Pulmonary:      Effort: Pulmonary effort is normal.      Breath sounds: Normal breath sounds.   Abdominal:      General: There is no distension.      Tenderness: There is abdominal tenderness (mild, pelvic area). There is no guarding or rebound.      Comments: RLQ ostomy  Left gluteal drain   Musculoskeletal:      Cervical back: No tenderness.      Right lower leg: No edema.      Left lower leg: No edema.   Skin:     General: Skin is warm and dry.   Neurological:      General: No focal deficit present.      Mental Status: He is alert and oriented to person, place, and time.      Cranial Nerves: No cranial nerve deficit.         Fluids    Intake/Output Summary (Last 24 hours) at 3/31/2022 1556  Last data filed at 3/31/2022 1115  Gross per 24 hour   Intake 440 ml   Output 30 ml   Net 410 ml       Laboratory  Recent Labs     03/29/22  0903 03/30/22  0436 03/31/22  0317   WBC 5.0 5.2 5.3   RBC 4.19* 4.17* 4.31*   HEMOGLOBIN 13.2* 12.9* 13.3*   HEMATOCRIT 38.5* 38.9* 39.3*   MCV 91.9 93.3 91.2   MCH 31.5 30.9 30.9   MCHC 34.3 33.2* 33.8   RDW 44.5 44.9 43.5   PLATELETCT 318 312 298   MPV 9.1 9.0 9.1     Recent Labs     03/29/22  0903 03/30/22  0436 03/31/22  0317   SODIUM 137 138 137   POTASSIUM 4.0 3.8 4.0    CHLORIDE 104 106 106   CO2 22 23 19*   GLUCOSE 136* 100* 87   BUN 7* 8 6*   CREATININE 1.06 0.92 0.84   CALCIUM 8.5 8.3* 8.7                   Imaging  IR-PICC LINE PLACEMENT W/ GUIDANCE > AGE 5   Final Result                  Ultrasound-guided PICC placement performed by qualified nursing staff as    above.          [id number]   Final Result      1.  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      [id number] VIEW   Final Result      1.  Pigtail drainage catheter in place within the pelvis.   2.  Nonobstructive bowel gas pattern.      CT-PELVIS WITH   Final Result      1.  Right lower quadrant ileostomy.   2.  Left transgluteal posterior pelvic/presacral pigtail drainage catheter remains in place. No residual fluid collection evident about the pigtail loop.   3.  Enteric contrast opacifying pelvic small bowel loops. No appreciable communication of contrast into the presacral collection.   4.  Catheter abscessogram may be of interest if there is strong clinical suspicion for a fistula.      CT-ABDOMEN-PELVIS WITH   Final Result         1. No contrast is seen within the presacral fluid collection to suggest fistulous connection.      CT-ABDOMEN&PELVIS ENTEROGRAPHY   Final Result      1.  Unchanged presacral/precoccygeal space fluid collection measuring 2.9 x 2.2 cm consistent with abscess      2.  Prior colectomy and there is a right lower quadrant ostomy      3.  No direct evidence for fistula although cannot be excluded with the negative contrast administered for enterography. Consider repeat CT scan with standard oral contrast when clinically appropriate      4.  Postoperative changes in the presacral/precoccygeal space      5.  Mass contiguous with the inferior aspect of rectal stump which could be tumor versus enlarged prostate.      6.  No other significant finding      CT-DRAIN-PERITONEAL   Final Result      1.  CT GUIDED RETROPERITONEAL PELVIC/PRESACRAL CATHETER DRAINAGE. INITIAL FLUID  RETURN RESEMBLED ENTERIC CONTENTS. A FISTULOUS LEAK MAY BE PRESENT. LIKEWISE, THE AIR CONTENT SUGGESTS THE POSSIBILITY OF COMMUNICATION WITH BOWEL.   2.  THE CURRENT PLAN IS TO MONITOR DRAINAGE OUTPUT, IRRIGATE DAILY AND OBTAIN A FOLLOWUP CT SCAN IN 5-7 DAYS IF CLINICALLY INDICATED.   3. ABSCESSOGRAM MAY BE OF INTEREST PRIOR TO CATHETER REMOVAL TO DETERMINE IF THERE IS FISTULOUS COMMUNICATION WITH SMALL BOWEL IN THE PELVIS.      OUTSIDE IMAGES-CT ABDOMEN /PELVIS   Final Result           Assessment/Plan  * Abdominal fluid collection- (present on admission)  Assessment & Plan  CT-guided retroperitoneal pelvic/presacral catheter drainage 3/22/2022  ID recommendation - IV Ertapenem until 5/9/2022  IR recommendation - flush drain daily  PICC line placed today      Case management for discharge planning    Fistula- (present on admission)  Assessment & Plan  Follow up with Colorectal surgery as outpatient    SBO (small bowel obstruction) (HCC)- (present on admission)  Assessment & Plan  Resolved    Hypomagnesemia- (present on admission)  Assessment & Plan  oral Mg  Follow level    Rectal mass- (present on admission)  Assessment & Plan  History of colorectal cancer  Follow-up with colorectal surgery as outpatient    Hypokalemia- (present on admission)  Assessment & Plan  Follow BMP    Tobacco chew use- (present on admission)  Assessment & Plan  Education and counseling       VTE prophylaxis: enoxaparin ppx    I have performed a physical exam and reviewed and updated ROS and Plan today (3/31/2022). In review of yesterday's note (3/30/2022), there are no changes except as documented above.

## 2022-03-31 NOTE — PROGRESS NOTES
Infectious Disease Progress Note    Author: Hilaria Ho M.D. Date & Time of service: 3/31/2022  11:50 AM    Chief Complaint:  abd abscess  Small bowel fistula fistula      Interval History:  51 y.o. male with h/o recurrent colon cancer admitted 3/21/2022 as transfer from [place] for N/V due to SBO. Found to have presacral abscess and probable fistula  3/25 AF WBC 5.8 tolerating some PO No immediate plan for surgery  3/26 AF WBC 5.9 tolerating diet well-feeling much better. Denies SE abx  3/28 AF WBC 5.3 plan for repeat CT today  3/29 AF eating well planned for repeat imaging  3/30 AF down for abscessogram-results reviewed +small fistula  3/31 AF tolerating abx Just got midline placed-per CM going to [facility] in [place]    Labs Reviewed, Medications Reviewed and Wound Reviewed.    Review of Systems:  Review of Systems   Constitutional: Negative for fever.   Respiratory: Negative for shortness of breath.    Gastrointestinal: Negative for nausea and vomiting.        Pain controlled   All other systems reviewed and are negative.      Hemodynamics:  Temp (24hrs), Av.4 °C (97.6 °F), Min:36.3 °C (97.4 °F), Max:36.5 °C (97.7 °F)  Temperature: 36.5 °C (97.7 °F)  Pulse  Av.8  Min: 47  Max: 112   Blood Pressure: 105/69       Physical Exam:  Physical Exam  Vitals and nursing note reviewed.   Constitutional:       General: He is not in acute distress.     Appearance: He is not toxic-appearing or diaphoretic.   HENT:      Nose: No rhinorrhea.      Mouth/Throat:      Pharynx: No oropharyngeal exudate.   Eyes:      General: No scleral icterus.     Extraocular Movements: Extraocular movements intact.      Pupils: Pupils are equal, round, and reactive to light.   Cardiovascular:      Rate and Rhythm: Normal rate.   Pulmonary:      Effort: Pulmonary effort is normal. No respiratory distress.      Breath sounds: No stridor.   Abdominal:      General: There is no distension.      Palpations: Abdomen is soft.       Comments: drain   Musculoskeletal:      Right lower leg: No edema.      Left lower leg: No edema.      Comments: RUE PICC   Skin:     Coloration: Skin is not jaundiced.   Neurological:      General: No focal deficit present.      Mental Status: He is alert and oriented to person, place, and time.   Psychiatric:         Mood and Affect: Mood normal.         Behavior: Behavior normal.         Meds:    Current Facility-Administered Medications:   •  [DISCONTINUED] piperacillin-tazobactam **AND** piperacillin-tazobactam  •  magnesium oxide  •  HYDROcodone-acetaminophen  •  diphenhydrAMINE  •  morphine injection  •  senna-docusate **AND** polyethylene glycol/lytes **AND** magnesium hydroxide **AND** bisacodyl  •  enoxaparin (LOVENOX) injection  •  acetaminophen  •  Notify provider if pain remains uncontrolled **AND** Use the Numeric Rating Scale (NRS), Ervin-Baker Faces (WBF), or FLACC on regular floors and Critical-Care Pain Observation Tool (CPOT) on ICUs/Trauma to assess pain **AND** Pulse Ox **AND** Pharmacy Consult Request **AND** If patient difficult to arouse and/or has respiratory depression (respiratory rate of 10 or less), stop any opiates that are currently infusing and call a Rapid Response.  •  labetalol  •  ondansetron  •  ondansetron  •  promethazine  •  promethazine  •  prochlorperazine    Labs:  Recent Labs     03/29/22 0903 03/30/22 0436 03/31/22 0317   WBC 5.0 5.2 5.3   RBC 4.19* 4.17* 4.31*   HEMOGLOBIN 13.2* 12.9* 13.3*   HEMATOCRIT 38.5* 38.9* 39.3*   MCV 91.9 93.3 91.2   MCH 31.5 30.9 30.9   RDW 44.5 44.9 43.5   PLATELETCT 318 312 298   MPV 9.1 9.0 9.1   NEUTSPOLYS 51.40 48.00 48.50   LYMPHOCYTES 36.90 39.60 40.10   MONOCYTES 7.10 8.70 7.40   EOSINOPHILS 2.60 2.30 2.30   BASOPHILS 1.60 1.00 1.30     Recent Labs     03/29/22 0903 03/30/22 0436 03/31/22 0317   SODIUM 137 138 137   POTASSIUM 4.0 3.8 4.0   CHLORIDE 104 106 106   CO2 22 23 19*   GLUCOSE 136* 100* 87   BUN 7* 8 6*     Recent Labs      03/29/22  0903 03/30/22  0436 03/31/22  0317   CREATININE 1.06 0.92 0.84       Imaging:  CT-DRAIN-PERITONEAL    Result Date: 3/22/2022  3/22/2022 4:31 PM HISTORY/REASON FOR EXAM:  Noted intra-abdominal fluid collection near bladder on outside CT scan. Presacral fluid collection. History of total colectomy. Right lower quadrant ileostomy. Intermittent small bowel obstruction. TECHNIQUE/EXAM DESCRIPTION: Pelvic (presacral/retroperitoneal) abscess drainage with CT guidance. Low dose optimization technique was utilized for this CT exam including automated exposure control and adjustment of the mA and/or kV according to patient size. PROCEDURE: Informed consent was obtained. Localizing CT images were obtained with the patient in prone position. The skin was prepped with ChloraPrep and draped in a sterile fashion. Moderate sedation was provided. Pulse oximetry and continuous cardiac monitoring by the nurse was performed throughout the exam. Intraservice time was 30 minutes. Following local anesthesia with 1% Lidocaine, a 17 G guiding needle was placed and needle path via a left transgluteal approach confirmed with CT. An Amplatz guidewire was placed and following serial tract dilatation, a 8 Hungarian pigtail locking catheter was placed. A specimen was collected and submitted for culture and sensitivity and Gram stain. Total of 10 mL initially butterscotch colored fluid turning to pink opaque fluid was drained. The initial fluid returned resembled enteric contents. The catheter was secured to the skin and connected to suction bulb drainage. Final CT images were obtained documenting catheter position. The patient tolerated the procedure well with no evidence of complication. COMPARISON: CT of the abdomen and pelvis outside films 3/20/2022 FINDINGS: Initial CT images again show an approximately 3.7 cm presacral collection with an air-fluid level. The final CT images show satisfactory catheter position within the targeted  presacral collection.     1.  CT GUIDED RETROPERITONEAL PELVIC/PRESACRAL CATHETER DRAINAGE. INITIAL FLUID RETURN RESEMBLED ENTERIC CONTENTS. A FISTULOUS LEAK MAY BE PRESENT. LIKEWISE, THE AIR CONTENT SUGGESTS THE POSSIBILITY OF COMMUNICATION WITH BOWEL. 2.  THE CURRENT PLAN IS TO MONITOR DRAINAGE OUTPUT, IRRIGATE DAILY AND OBTAIN A FOLLOWUP CT SCAN IN 5-7 DAYS IF CLINICALLY INDICATED. 3. ABSCESSOGRAM MAY BE OF INTEREST PRIOR TO CATHETER REMOVAL TO DETERMINE IF THERE IS FISTULOUS COMMUNICATION WITH SMALL BOWEL IN THE PELVIS.    CT-ABDOMEN&PELVIS ENTEROGRAPHY    Result Date: 3/25/2022  CT abdomen and pelvis with contrast 3/24/2022 1725 hours is HISTORY/REASON FOR EXAM:  Bowel obstruction suspected; Pelvic fluid collection - S/P IR drain placement.  Pt with Hx of total abdominal colectomy. possible small bowel fistula; Possible fistula to pelvic fluid collection TECHNIQUE/EXAM DESCRIPTION:  CT abdomen and pelvis with contrast, enterography protocol. Following oral administration of VoLumen oral contrast and water as well as intravenous administration of 100 mL of Omnipaque 350 nonionic contrast, thin section helical CT is performed from the level of the diaphragm through the ischial tuberosities. Axial, coronal, and sagittal images are sent to PACS. Low dose optimization technique was utilized for this CT exam including automated exposure control and adjustment of the mA and/or kV according to patient size. COMPARISON:  Outside CT abdomen and pelvis 3/20/2022 FINDINGS: The study was ordered and performed as CT enterography which utilizes negative oral contrast. This precludes assessment of a fistula. We called the patient's caregivers recommending that the scan be repeated and that is not possible at this time therefore, the scan is being interpreted as performed. LUNGS: The lung bases are clear. Left ventricle is probably dilated. Osseous structures:  No significant finding. Abdomen: LIVER: is normal in appearance.  "GALLBLADDER and BILIARY SYSTEM The gallbladder is normal in CT appearance. There is no biliary dilation. SPLEEN: Normal in appearance. PANCREAS: The pancreas is normal in appearance. The splenic vein and portal vein enhance normally. ADRENAL glands: Normal in appearance. RIGHT kidney: Normal in appearance. LEFT kidney: Normal in appearance. There is no hydronephrosis. BOWEL and MESENTERY: There is a right-sided ileostomy. There has been a colectomy. There is a presacral fluid collection containing a catheter. Fluid collection measures 2.9 x 2.2 cm consistent with an abscess and may indicate leakage. AORTA and VASCULATURE: Atherosclerosis without aneurysm. Pelvis: There is possibly a rectal mass versus prostate enlargement.     1.  Unchanged presacral/precoccygeal space fluid collection measuring 2.9 x 2.2 cm consistent with abscess 2.  Prior colectomy and there is a right lower quadrant ostomy 3.  No direct evidence for fistula although cannot be excluded with the negative contrast administered for enterography. Consider repeat CT scan with standard oral contrast when clinically appropriate 4.  Postoperative changes in the presacral/precoccygeal space 5.  Mass contiguous with the inferior aspect of rectal stump which could be tumor versus enlarged prostate. 6.  No other significant finding      Micro:  Results     Procedure Component Value Units Date/Time    BLOOD CULTURE [[id number]] Collected: 03/25/22 1603    Order Status: Completed Specimen: Blood from Peripheral Updated: 03/30/22 1700     Significant Indicator NEG     Source BLD     Site PERIPHERAL     Culture Result No growth after 5 days of incubation.    Narrative:      Per Hospital Policy: Only change Specimen Src: to \"Line\" if  specified by physician order.  Left Hand    BLOOD CULTURE [[id number]] Collected: 03/25/22 1448    Order Status: Completed Specimen: Blood from Peripheral Updated: 03/30/22 1700     Significant Indicator NEG     Source BLD     Site " "PERIPHERAL     Culture Result No growth after 5 days of incubation.    Narrative:      Per Hospital Policy: Only change Specimen Src: to \"Line\" if  specified by physician order.  Left Hand    FLUID CULTURE W/GRAM STAIN [[id number]]  (Abnormal)  (Susceptibility) Collected: 03/22/22 1606    Order Status: Completed Specimen: Body Fluid Updated: 03/24/22 1252     Significant Indicator POS     Source BF     Site Peritoneal Fluid     Culture Result Light growth mixed skin andrei.     Gram Stain Result Moderate WBCs.  Few Gram negative rods.       Culture Result Escherichia coli  Light growth      Narrative:      order req#[id number]    Susceptibility     Escherichia coli (1)     Antibiotic Interpretation Microscan   Method Status    Ampicillin Sensitive <=8 mcg/mL PATTIE Final    Ceftriaxone Sensitive <=1 mcg/mL PATTIE Final    Cefazolin Sensitive <=2 mcg/mL PATTIE Final    Ciprofloxacin Sensitive <=0.25 mcg/mL PATTIE Final    Cefepime Sensitive <=2 mcg/mL PATTIE Final    Cefuroxime Sensitive <=4 mcg/mL PATTIE Final    Ampicillin/sulbactam Sensitive <=4/2 mcg/mL PATTIE Final    Ertapenem Sensitive <=0.5 mcg/mL PATTIE Final    Tobramycin Sensitive <=2 mcg/mL PATTIE Final    Gentamicin Sensitive <=2 mcg/mL PATTIE Final    Minocycline Sensitive <=4 mcg/mL PATTIE Final    Moxifloxacin Sensitive <=2 mcg/mL PATTIE Final    Pip/Tazobactam Sensitive <=8 mcg/mL PATTIE Final    Trimeth/Sulfa Sensitive <=0.5/9.5 mcg/mL PATTIE Final    Tigecycline Sensitive <=2 mcg/mL PATTIE Final                         Assessment:  Active Hospital Problems    Diagnosis    • *Small bowel obstruction (HCC) [K56.609]    • Abdominal fluid collection [R18.8]    • Tobacco chew use [Z72.0]    • Leukocytosis [D72.829]    • Hypokalemia [E87.6]      Recurrent SBO  H/o recurrent colon cancer-rectal mass on CT  Fistula  Intraabd abscess-Ecoli-not improved on CT 3/25  Leukocytosis resolved        PLAN:    CT 3/25 : Unchanged presacral/precoccygeal space fluid collection measuring 2.9 x 2.2 cm consistent with " abscess 2.  Prior colectomy and there is a right lower quadrant ostomy 3.  No direct evidence for fistula although cannot be excluded with the negative contrast administered for enterography. Consider repeat CT scan with standard oral contrast when clinically appropriate 4.  Postoperative changes in the presacral/precoccygeal space 5.  Mass contiguous with the inferior aspect of rectal stump which could be tumor versus enlarged prostate  Need for larger drain per IR. No current plan for surgery  CT 3/28 no obvious fistula-no residual fluid and scant drain output. Catheter abscessogram recommended    Abscessogram today shows fistula-plan per surgery is antibiotics, no surgery    As fistula present will require weeks of antibiotics + drain to promote healing  Continue Zosyn while in the hospital  Plan for PICC for extended course IV antibiotics to promote spontaneous closure  Orders sent to CM-Zosyn thru 5/9/2022. New orders for ertapenem done today.   Drain and FU abscessogram per surgery  Further eval rectal stump mass per PCT    FU ID after discharge

## 2022-03-31 NOTE — DISCHARGE PLANNING
Care Transition Team Discharge Planning    Anticipates discharge disposition:  · Home with Home Health vs Home with [facility]fusion [facility] Follow Up     Action:  • Pt was discussed in ICT rounds with Dr Yates, Explained that Pt is pending acceptance with [facility] due to continuous IV antibiotic dosing.     This RN CM spoke with Radha, Phrarmacist at the [facility] who states that Pt can be accepted on Ertapenem daily dosing. This RN CM sent a message to Dr Ho via voalte,  Requested Dr Ho to fax new orders to CM if she approves it.    This RN CM received a new order from Dr Ho for Ertapenem 1 gram daily up to 5/9 /22.    This RN CM spoke with Art, Pharmacist at the [facility] who states that once he receives  the order by fax , it will be sent to their Access Department for Pt's Insurance auth and then he will call this RN CM for schedule.     Per Kailee DAVIS, [facility] is reviewing the case.    Barriers to Discharge:  Pending medical clearance  • Pending acceptance at [facility]/appointment  • Pending Pt's HH acceptance    Plan:  • CM to continue to assist Pt with discharge as needed

## 2022-03-31 NOTE — PROCEDURES
Vascular Access Team     Date of Insertion: 3/31/2022  Arm Circumference: 34  Internal length: 45  External Length: 0  Vein Occupancy %: 21   Reason for PICC: antibiotic therapy   Labs: WBC 5.3, , BUN 6, Cr 0.84, , INR na     Consents confirmed, vessel patency confirmed with ultrasound. Risks and benefits of procedure explained to patient and education regarding central line associated bloodstream infections provided. Questions answered.      PICC placed in RUE per licensed provider order with ultrasound guidance.  4 Fr, single lumen PICC placed in brachial vein after 1 attempt(s). 2 mL of 1% lidocaine injected intradermally at the insertion site. A 21 gauge microintroducer needle was visualized entering the vein and modified Seldinger technique was used to obtain access to the vein. 45 cm catheter inserted and brisk blood return was observed from each lumen upon aspiration. Line secured at the 0 cm marker. TCS stylet removed and observed to be fully intact. Each lumen flushed using pulsatile method without resistance with 10 mL 0.9% normal saline. PICC line secured with Biopatch and Tegaderm.     PICC tip placement location is confirmed by nurse to be in the Superior Vena Cava (SVC) utilizing 3CG technology. PICC line is appropriate for use at this time. Patient tolerated procedure well, without complications.  Patient condition relayed to primary RN or ordering physician via this post procedure note in the EMR.      Ultrasound images uploaded to PACS and viewable in the EMR - yes  Ultrasound imaged printed and placed in paper chart - no     BARD Power PICC ref # [id number], Lot # [id number], Expiration Date 04/30/2023

## 2022-03-31 NOTE — CARE PLAN
Problem: Pain - Standard  Goal: Alleviation of pain or a reduction in pain to the patient’s comfort goal  Outcome: Progressing     Problem: Mobility  Goal: Patient's capacity to carry out activities will improve  Outcome: Progressing   The patient is Stable - Low risk of patient condition declining or worsening    Shift Goals  Clinical Goals: drain care, pain management  Patient Goals: Pain control, rest  Family Goals: No family present at this time.    Progress made toward(s) clinical / shift goals:  medicating for pain PRN per MAR, flushing drain per order, dressing CDI, emptying PRN.  Will educate pt on self care of drain for home    Patient is not progressing towards the following goals:

## 2022-03-31 NOTE — CARE PLAN
The patient is Stable - Low risk of patient condition declining or worsening    Shift Goals  Clinical Goals: Pain control, rest, monitor NETTA output  Patient Goals: Pain control, rest  Family Goals: No family present at this time.    Problem: Knowledge Deficit - Standard  Goal: Patient and family/care givers will demonstrate understanding of plan of care, disease process/condition, diagnostic tests and medications  Outcome: Progressing     Problem: Pain - Standard  Goal: Alleviation of pain or a reduction in pain to the patient’s comfort goal  Outcome: Progressing     Progress made toward(s) clinical / shift goals:      Patient updated on current plan of care and verbalizes understanding.   Pain is controlled with current medications per MAR.     Patient is not progressing towards the following goals:

## 2022-04-01 ENCOUNTER — APPOINTMENT (OUTPATIENT)
Dept: RADIOLOGY | Facility: MEDICAL CENTER | Age: 52
DRG: 372 | End: 2022-04-01
Attending: FAMILY MEDICINE
Payer: COMMERCIAL

## 2022-04-01 LAB
ANION GAP SERPL CALC-SCNC: 10 MMOL/L (ref 7–16)
BASOPHILS # BLD AUTO: 1 % (ref 0–1.8)
BASOPHILS # BLD: 0.07 K/UL (ref 0–0.12)
BUN SERPL-MCNC: 7 MG/DL (ref 8–22)
CALCIUM SERPL-MCNC: 8.5 MG/DL (ref 8.5–10.5)
CHLORIDE SERPL-SCNC: 105 MMOL/L (ref 96–112)
CO2 SERPL-SCNC: 21 MMOL/L (ref 20–33)
CREAT SERPL-MCNC: 0.83 MG/DL (ref 0.5–1.4)
EOSINOPHIL # BLD AUTO: 0.15 K/UL (ref 0–0.51)
EOSINOPHIL NFR BLD: 2.1 % (ref 0–6.9)
ERYTHROCYTE [DISTWIDTH] IN BLOOD BY AUTOMATED COUNT: 43.4 FL (ref 35.9–50)
GFR SERPLBLD CREATININE-BSD FMLA CKD-EPI: 106 ML/MIN/1.73 M 2
GLUCOSE SERPL-MCNC: 94 MG/DL (ref 65–99)
HCT VFR BLD AUTO: 39.4 % (ref 42–52)
HGB BLD-MCNC: 13.8 G/DL (ref 14–18)
IMM GRANULOCYTES # BLD AUTO: 0.03 K/UL (ref 0–0.11)
IMM GRANULOCYTES NFR BLD AUTO: 0.4 % (ref 0–0.9)
LYMPHOCYTES # BLD AUTO: 2.12 K/UL (ref 1–4.8)
LYMPHOCYTES NFR BLD: 29.2 % (ref 22–41)
MCH RBC QN AUTO: 31.4 PG (ref 27–33)
MCHC RBC AUTO-ENTMCNC: 35 G/DL (ref 33.7–35.3)
MCV RBC AUTO: 89.5 FL (ref 81.4–97.8)
MONOCYTES # BLD AUTO: 0.56 K/UL (ref 0–0.85)
MONOCYTES NFR BLD AUTO: 7.7 % (ref 0–13.4)
NEUTROPHILS # BLD AUTO: 4.32 K/UL (ref 1.82–7.42)
NEUTROPHILS NFR BLD: 59.6 % (ref 44–72)
NRBC # BLD AUTO: 0 K/UL
NRBC BLD-RTO: 0 /100 WBC
PLATELET # BLD AUTO: 288 K/UL (ref 164–446)
PMV BLD AUTO: 9 FL (ref 9–12.9)
POTASSIUM SERPL-SCNC: 3.8 MMOL/L (ref 3.6–5.5)
RBC # BLD AUTO: 4.4 M/UL (ref 4.7–6.1)
SODIUM SERPL-SCNC: 136 MMOL/L (ref 135–145)
WBC # BLD AUTO: 7.3 K/UL (ref 4.8–10.8)

## 2022-04-01 PROCEDURE — 74177 CT ABD & PELVIS W/CONTRAST: CPT

## 2022-04-01 PROCEDURE — A9270 NON-COVERED ITEM OR SERVICE: HCPCS | Performed by: HOSPITALIST

## 2022-04-01 PROCEDURE — 700117 HCHG RX CONTRAST REV CODE 255: Performed by: FAMILY MEDICINE

## 2022-04-01 PROCEDURE — 700105 HCHG RX REV CODE 258: Performed by: FAMILY MEDICINE

## 2022-04-01 PROCEDURE — 85025 COMPLETE CBC W/AUTO DIFF WBC: CPT

## 2022-04-01 PROCEDURE — 700111 HCHG RX REV CODE 636 W/ 250 OVERRIDE (IP): Performed by: HOSPITALIST

## 2022-04-01 PROCEDURE — 700105 HCHG RX REV CODE 258: Performed by: INTERNAL MEDICINE

## 2022-04-01 PROCEDURE — 700102 HCHG RX REV CODE 250 W/ 637 OVERRIDE(OP): Performed by: HOSPITALIST

## 2022-04-01 PROCEDURE — 700111 HCHG RX REV CODE 636 W/ 250 OVERRIDE (IP): Performed by: INTERNAL MEDICINE

## 2022-04-01 PROCEDURE — 770001 HCHG ROOM/CARE - MED/SURG/GYN PRIV*

## 2022-04-01 PROCEDURE — 80048 BASIC METABOLIC PNL TOTAL CA: CPT

## 2022-04-01 PROCEDURE — 700102 HCHG RX REV CODE 250 W/ 637 OVERRIDE(OP): Performed by: FAMILY MEDICINE

## 2022-04-01 PROCEDURE — 99233 SBSQ HOSP IP/OBS HIGH 50: CPT | Performed by: FAMILY MEDICINE

## 2022-04-01 PROCEDURE — A9270 NON-COVERED ITEM OR SERVICE: HCPCS | Performed by: FAMILY MEDICINE

## 2022-04-01 PROCEDURE — 36415 COLL VENOUS BLD VENIPUNCTURE: CPT

## 2022-04-01 RX ORDER — SODIUM CHLORIDE 9 MG/ML
INJECTION, SOLUTION INTRAVENOUS CONTINUOUS
Status: DISCONTINUED | OUTPATIENT
Start: 2022-04-01 | End: 2022-04-02

## 2022-04-01 RX ADMIN — SODIUM CHLORIDE: 9 INJECTION, SOLUTION INTRAVENOUS at 17:11

## 2022-04-01 RX ADMIN — ENOXAPARIN SODIUM 40 MG: 40 INJECTION SUBCUTANEOUS at 15:06

## 2022-04-01 RX ADMIN — MORPHINE SULFATE 2 MG: 4 INJECTION INTRAVENOUS at 15:05

## 2022-04-01 RX ADMIN — MORPHINE SULFATE 2 MG: 4 INJECTION INTRAVENOUS at 01:21

## 2022-04-01 RX ADMIN — PIPERACILLIN AND TAZOBACTAM 3.38 G: 3; .375 INJECTION, POWDER, LYOPHILIZED, FOR SOLUTION INTRAVENOUS; PARENTERAL at 13:53

## 2022-04-01 RX ADMIN — SENNOSIDES AND DOCUSATE SODIUM 2 TABLET: 50; 8.6 TABLET ORAL at 17:12

## 2022-04-01 RX ADMIN — IOHEXOL 100 ML: 350 INJECTION, SOLUTION INTRAVENOUS at 17:47

## 2022-04-01 RX ADMIN — MORPHINE SULFATE 2 MG: 4 INJECTION INTRAVENOUS at 20:01

## 2022-04-01 RX ADMIN — HYDROCODONE BITARTRATE AND ACETAMINOPHEN 1 TABLET: 5; 325 TABLET ORAL at 22:36

## 2022-04-01 RX ADMIN — Medication 400 MG: at 06:15

## 2022-04-01 RX ADMIN — ONDANSETRON 4 MG: 2 INJECTION INTRAMUSCULAR; INTRAVENOUS at 20:07

## 2022-04-01 RX ADMIN — HYDROCODONE BITARTRATE AND ACETAMINOPHEN 1 TABLET: 5; 325 TABLET ORAL at 00:11

## 2022-04-01 RX ADMIN — DIPHENHYDRAMINE HYDROCHLORIDE 25 MG: 25 TABLET ORAL at 01:21

## 2022-04-01 RX ADMIN — PIPERACILLIN AND TAZOBACTAM 3.38 G: 3; .375 INJECTION, POWDER, LYOPHILIZED, FOR SOLUTION INTRAVENOUS; PARENTERAL at 22:35

## 2022-04-01 RX ADMIN — HYDROCODONE BITARTRATE AND ACETAMINOPHEN 1 TABLET: 5; 325 TABLET ORAL at 13:53

## 2022-04-01 RX ADMIN — HYDROCODONE BITARTRATE AND ACETAMINOPHEN 1 TABLET: 5; 325 TABLET ORAL at 06:16

## 2022-04-01 RX ADMIN — PIPERACILLIN AND TAZOBACTAM 3.38 G: 3; .375 INJECTION, POWDER, LYOPHILIZED, FOR SOLUTION INTRAVENOUS; PARENTERAL at 06:15

## 2022-04-01 RX ADMIN — SENNOSIDES AND DOCUSATE SODIUM 2 TABLET: 50; 8.6 TABLET ORAL at 06:15

## 2022-04-01 RX ADMIN — Medication 400 MG: at 17:12

## 2022-04-01 ASSESSMENT — ENCOUNTER SYMPTOMS
HEADACHES: 0
VOMITING: 0
CONSTITUTIONAL NEGATIVE: 1
MUSCULOSKELETAL NEGATIVE: 1
HEARTBURN: 0
NEUROLOGICAL NEGATIVE: 1
FLANK PAIN: 0
DIZZINESS: 0
DIAPHORESIS: 0
SHORTNESS OF BREATH: 0
NECK PAIN: 0
SORE THROAT: 0
BACK PAIN: 0
ABDOMINAL PAIN: 1
DIARRHEA: 0
SPEECH CHANGE: 0
RESPIRATORY NEGATIVE: 1
DOUBLE VISION: 0
SENSORY CHANGE: 0
FEVER: 0
NAUSEA: 0
COUGH: 0
NERVOUS/ANXIOUS: 0
MYALGIAS: 0
CARDIOVASCULAR NEGATIVE: 1
EYES NEGATIVE: 1
PSYCHIATRIC NEGATIVE: 1
PALPITATIONS: 0
WEAKNESS: 0
FOCAL WEAKNESS: 0
WHEEZING: 0
GASTROINTESTINAL NEGATIVE: 1
CHILLS: 0
BLURRED VISION: 0

## 2022-04-01 ASSESSMENT — PAIN DESCRIPTION - PAIN TYPE
TYPE: ACUTE PAIN
TYPE: ACUTE PAIN

## 2022-04-01 NOTE — DISCHARGE PLANNING
Care Transition Team Discharge Planning    Anticipates discharge disposition:  • Home with OP Infusion Clinic Follow Up    Action:  • This RN CM spoke with GarciaPharmacist at the [facility] who states that in order for them to obtain  Insurance auth with , the forms DME and Prostethics including Community Care Provider-request for Service must be filled out and signed by Prescribing MD.    Per Radha there is no Physician who can write the order for the Pt at [facility] so [facility] will not get the Insurance auth.     Per Dr Ho , she does not have privileges with the [facility] so she will not fill out/sign the forms because of this reason.    Informed Pt who states that  [facility] is his second choice. Per Jayda Castro of [facility], the process with Insurance Auth is the same , Faxed orders to [facility].    Per Pt he has an appointment with his [facility] PCP  on 4/14th but he cannot remember his PCP.Will obtain information directly with [facility].    This RN CM escalated the case to MERLIN Alcantar Manager who states the she will ask Dr Bass and Dr Holloway if they can fill out/ sign the form. Forms are at  desk/office.    Once said forms are filled out/signed then the forms must be faxed to the [facility].     Per Jayda in case [facility] obtains the Insurance auth thru , she can start the teach next week.     Per [facility] if [facility] will pay the cost it is at $1,930 per week. Informed Katharine  Manager.    The forms were signed and filled out by Dr Yates  and faxed them to Art, Pharmacist at [facility] at F [phone]. Per Art he will assist in coordinating with their Access for Insurance Auth thru . Art to call this RN CM for update.      Barriers to Discharge:  • Pending Pt's acceptance with [facility] vs [facility].  • Pending MD who can sign the [facility] required forms for Insurance auth    Plan:  • CM to continue to assist Pt with discharge as needed

## 2022-04-01 NOTE — PROGRESS NOTES
Received report from night shift RN. Assumed patient care.    Patient is A+O x4.  Tolerating regular diet. Denies N/V.  Denies chest pain or SOB.  Pain 7 on a 0-10 pain scale. Medicated per MAR.  + void, output per RLQ ileostomy.   Patient ambulates up self.     Plan of care discussed, all questions answered. Educated on the importance of calling before getting OOB and pt verbalizes understanding. Educated regarding importance of oral care. Oral care kit at bedside. Call light is within reach, treaded slipper socks on, bed in lowest/ locked position, hourly rounding in place, all needs met at this time

## 2022-04-01 NOTE — CARE PLAN
The patient is Stable - Low risk of patient condition declining or worsening    Shift Goals  Clinical Goals: Pain control, drain care  Patient Goals: Pain control, rest  Family Goals: No family present at this time    Problem: Knowledge Deficit - Standard  Goal: Patient and family/care givers will demonstrate understanding of plan of care, disease process/condition, diagnostic tests and medications  Outcome: Progressing     Problem: Pain - Standard  Goal: Alleviation of pain or a reduction in pain to the patient’s comfort goal  Outcome: Progressing     Progress made toward(s) clinical / shift goals:      Patient updated on current plan of care and verbalizes understanding.   Pain is controlled with current medications per MAR.     Patient is not progressing towards the following goals:

## 2022-04-01 NOTE — PROGRESS NOTES
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  PICC line placed for outpatient antibiotics    -No surgical intervention, ok to DC from surgical standpoint, will need to follow up with , antibiotics per ID  -Surgery team signing off      REVIEW OF SYSTEMS:  Review of Systems   Constitutional: Negative.    Eyes: Negative.  Negative for blurred vision and double vision.   Respiratory: Negative.    Cardiovascular: Negative.    Gastrointestinal: Negative.    Genitourinary: Negative.    Musculoskeletal: Negative.    Neurological: Negative.  Negative for dizziness and focal weakness.   Psychiatric/Behavioral: Negative.    All other systems reviewed and are negative.      PHYSICAL EXAMINATION:  Vital Signs: /66   Pulse 68   Temp 37.3 °C (99.2 °F) (Temporal)   Resp 16   Ht 1.88 m (6' 2.02\")   Wt 88.8 kg (195 lb 12.3 oz)   SpO2 98%   Physical Exam  Vitals and nursing note reviewed.   Constitutional:       Appearance: Normal appearance.   Abdominal:      General: Abdomen is flat. Bowel sounds are normal.      Palpations: Abdomen is soft.   Neurological:      Mental Status: He is alert.         LABORATORY VALUES:   Recent Labs     03/30/22  0436 03/31/22 0317 04/01/22  0058   WBC 5.2 5.3 7.3   RBC 4.17* 4.31* 4.40*   HEMOGLOBIN 12.9* 13.3* 13.8*   HEMATOCRIT 38.9* 39.3* 39.4*   MCV 93.3 91.2 89.5   MCH 30.9 30.9 31.4   MCHC 33.2* 33.8 35.0   RDW 44.9 43.5 43.4   PLATELETCT 312 298 288   MPV 9.0 9.1 9.0     Recent Labs     03/30/22  0436 03/31/22 0317 04/01/22  0058   SODIUM 138 137 136   POTASSIUM 3.8 4.0 3.8   CHLORIDE 106 106 105   CO2 23 19* 21   GLUCOSE 100* 87 94   BUN 8 6* 7*   CREATININE 0.92 0.84 0.83   CALCIUM 8.3* 8.7 8.5                IMAGING:   IR-PICC LINE PLACEMENT W/ GUIDANCE > AGE 5   Final Result                  Ultrasound-guided PICC placement performed by qualified " nursing staff as    above.          [id number]   Final Result      1.  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      [id number] VIEW   Final Result      1.  Pigtail drainage catheter in place within the pelvis.   2.  Nonobstructive bowel gas pattern.      CT-PELVIS WITH   Final Result      1.  Right lower quadrant ileostomy.   2.  Left transgluteal posterior pelvic/presacral pigtail drainage catheter remains in place. No residual fluid collection evident about the pigtail loop.   3.  Enteric contrast opacifying pelvic small bowel loops. No appreciable communication of contrast into the presacral collection.   4.  Catheter abscessogram may be of interest if there is strong clinical suspicion for a fistula.      CT-ABDOMEN-PELVIS WITH   Final Result         1. No contrast is seen within the presacral fluid collection to suggest fistulous connection.      CT-ABDOMEN&PELVIS ENTEROGRAPHY   Final Result      1.  Unchanged presacral/precoccygeal space fluid collection measuring 2.9 x 2.2 cm consistent with abscess      2.  Prior colectomy and there is a right lower quadrant ostomy      3.  No direct evidence for fistula although cannot be excluded with the negative contrast administered for enterography. Consider repeat CT scan with standard oral contrast when clinically appropriate      4.  Postoperative changes in the presacral/precoccygeal space      5.  Mass contiguous with the inferior aspect of rectal stump which could be tumor versus enlarged prostate.      6.  No other significant finding      CT-DRAIN-PERITONEAL   Final Result      1.  CT GUIDED RETROPERITONEAL PELVIC/PRESACRAL CATHETER DRAINAGE. INITIAL FLUID RETURN RESEMBLED ENTERIC CONTENTS. A FISTULOUS LEAK MAY BE PRESENT. LIKEWISE, THE AIR CONTENT SUGGESTS THE POSSIBILITY OF COMMUNICATION WITH BOWEL.   2.  THE CURRENT PLAN IS TO MONITOR DRAINAGE OUTPUT, IRRIGATE DAILY AND OBTAIN A FOLLOWUP CT SCAN IN 5-7 DAYS IF CLINICALLY INDICATED.    3. ABSCESSOGRAM MAY BE OF INTEREST PRIOR TO CATHETER REMOVAL TO DETERMINE IF THERE IS FISTULOUS COMMUNICATION WITH SMALL BOWEL IN THE PELVIS.      OUTSIDE IMAGES-CT ABDOMEN /PELVIS   Final Result            Olivas catheter: No Olivas      DVT Prophylaxis: Enoxaparin (Lovenox)        Assessed for rehab: Patient returned to prior level of function, rehabilitation not indicated at this time      ASSESSMENT AND PLAN:   * Abdominal fluid collection- (present on admission)  Assessment & Plan  Fluid collection in pelvis  IR drain placed 3/22  Bilious drainage  3/24 CT to evaluate for fistula- likely fistula   3/25 NETTA output decreasing  3/27 NETTA output decreasing   3/28 Repeat CT shows no fluid collection near drain  3/29 X-ray abdomen to include drain to determine if fistula is present  3/30 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

## 2022-04-01 NOTE — PROGRESS NOTES
[facility] Medicine Daily Progress Note    Date of Service  4/1/2022    Chief Complaint  Beau Mcintyre is a 51 y.o. male admitted 3/21/2022 with abdominal fluid collection.    Hospital Course  Admitted as a transfer for small bowel obstruction and abdominal fluid collection.  The small bowel obstruction spontaneously resolved.  For the abdominal fluid collection, surgery was consulted on the case.  Patient underwent CT-guided retroperitoneal pelvic/presacral catheter drainage on 3/22/2022 by interventional radiology.  He was started on empiric coverage with IV Zosyn.  Fluid culture showed E. coli which was pansensitive.  Infectious disease was consulted on the case.  Further work-up showed the presence of a fistula on abscessogram.  On discussion with infectious disease, and surgery, interventional radiology, the drain will need to remain.  He will require a prolonged IV antibiotic course.    Interval Problem Update  Abdominal fluid collection - drain to remain, pain controlled  Fistula - noted on abscess-o-gram    I have personally seen and examined the patient at bedside. I discussed the plan of care with patient, bedside RN, charge RN and .    Consultants/Specialty  general surgery, infectious disease and Interventional radiology    Code Status  Full Code    Disposition  Patient is medically cleared for discharge.   Anticipate discharge to to home with organized home healthcare and close outpatient follow-up.  I have placed the appropriate orders for post-discharge needs.    Review of Systems  Review of Systems   Constitutional: Negative for chills, diaphoresis, fever and malaise/fatigue.   HENT: Negative for congestion, hearing loss and sore throat.    Eyes: Negative for blurred vision.   Respiratory: Negative for cough, shortness of breath and wheezing.    Cardiovascular: Negative for chest pain, palpitations and leg swelling.   Gastrointestinal: Positive for abdominal pain. Negative for diarrhea,  heartburn, nausea and vomiting.   Genitourinary: Negative for dysuria, flank pain and hematuria.   Musculoskeletal: Negative for back pain, joint pain, myalgias and neck pain.   Skin: Negative for rash.   Neurological: Negative for dizziness, sensory change, speech change, focal weakness, weakness and headaches.   Psychiatric/Behavioral: The patient is not nervous/anxious.         Physical Exam  Temp:  [36.1 °C (97 °F)-37.3 °C (99.2 °F)] 37.3 °C (99.2 °F)  Pulse:  [52-68] 68  Resp:  [16-18] 16  BP: (100-108)/(60-73) 100/66  SpO2:  [95 %-98 %] 98 %    Physical Exam  Vitals and nursing note reviewed.   HENT:      Head: Normocephalic and atraumatic.      Nose: No congestion.      Mouth/Throat:      Mouth: Mucous membranes are moist.   Eyes:      Extraocular Movements: Extraocular movements intact.      Conjunctiva/sclera: Conjunctivae normal.   Cardiovascular:      Rate and Rhythm: Normal rate and regular rhythm.   Pulmonary:      Effort: Pulmonary effort is normal.      Breath sounds: Normal breath sounds.   Abdominal:      General: There is no distension.      Tenderness: There is abdominal tenderness (mild, pelvic area). There is no guarding or rebound.      Comments: RLQ ostomy  Left gluteal drain   Musculoskeletal:      Cervical back: No tenderness.      Right lower leg: No edema.      Left lower leg: No edema.   Skin:     General: Skin is warm and dry.   Neurological:      General: No focal deficit present.      Mental Status: He is alert and oriented to person, place, and time.      Cranial Nerves: No cranial nerve deficit.         Fluids    Intake/Output Summary (Last 24 hours) at 4/1/2022 1551  Last data filed at 4/1/2022 0739  Gross per 24 hour   Intake --   Output 5 ml   Net -5 ml       Laboratory  Recent Labs     03/30/22  0436 03/31/22  0317 04/01/22  0058   WBC 5.2 5.3 7.3   RBC 4.17* 4.31* 4.40*   HEMOGLOBIN 12.9* 13.3* 13.8*   HEMATOCRIT 38.9* 39.3* 39.4*   MCV 93.3 91.2 89.5   MCH 30.9 30.9 31.4   MCHC  33.2* 33.8 35.0   RDW 44.9 43.5 43.4   PLATELETCT 312 298 288   MPV 9.0 9.1 9.0     Recent Labs     03/30/22  0436 03/31/22  0317 04/01/22  0058   SODIUM 138 137 136   POTASSIUM 3.8 4.0 3.8   CHLORIDE 106 106 105   CO2 23 19* 21   GLUCOSE 100* 87 94   BUN 8 6* 7*   CREATININE 0.92 0.84 0.83   CALCIUM 8.3* 8.7 8.5                   Imaging  IR-PICC LINE PLACEMENT W/ GUIDANCE > AGE 5   Final Result                  Ultrasound-guided PICC placement performed by qualified nursing staff as    above.          [id number]   Final Result      1.  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      [id number] VIEW   Final Result      1.  Pigtail drainage catheter in place within the pelvis.   2.  Nonobstructive bowel gas pattern.      CT-PELVIS WITH   Final Result      1.  Right lower quadrant ileostomy.   2.  Left transgluteal posterior pelvic/presacral pigtail drainage catheter remains in place. No residual fluid collection evident about the pigtail loop.   3.  Enteric contrast opacifying pelvic small bowel loops. No appreciable communication of contrast into the presacral collection.   4.  Catheter abscessogram may be of interest if there is strong clinical suspicion for a fistula.      CT-ABDOMEN-PELVIS WITH   Final Result         1. No contrast is seen within the presacral fluid collection to suggest fistulous connection.      CT-ABDOMEN&PELVIS ENTEROGRAPHY   Final Result      1.  Unchanged presacral/precoccygeal space fluid collection measuring 2.9 x 2.2 cm consistent with abscess      2.  Prior colectomy and there is a right lower quadrant ostomy      3.  No direct evidence for fistula although cannot be excluded with the negative contrast administered for enterography. Consider repeat CT scan with standard oral contrast when clinically appropriate      4.  Postoperative changes in the presacral/precoccygeal space      5.  Mass contiguous with the inferior aspect of rectal stump which could be tumor  versus enlarged prostate.      6.  No other significant finding      CT-DRAIN-PERITONEAL   Final Result      1.  CT GUIDED RETROPERITONEAL PELVIC/PRESACRAL CATHETER DRAINAGE. INITIAL FLUID RETURN RESEMBLED ENTERIC CONTENTS. A FISTULOUS LEAK MAY BE PRESENT. LIKEWISE, THE AIR CONTENT SUGGESTS THE POSSIBILITY OF COMMUNICATION WITH BOWEL.   2.  THE CURRENT PLAN IS TO MONITOR DRAINAGE OUTPUT, IRRIGATE DAILY AND OBTAIN A FOLLOWUP CT SCAN IN 5-7 DAYS IF CLINICALLY INDICATED.   3. ABSCESSOGRAM MAY BE OF INTEREST PRIOR TO CATHETER REMOVAL TO DETERMINE IF THERE IS FISTULOUS COMMUNICATION WITH SMALL BOWEL IN THE PELVIS.      OUTSIDE IMAGES-CT ABDOMEN /PELVIS   Final Result           Assessment/Plan  * Abdominal fluid collection- (present on admission)  Assessment & Plan  CT-guided retroperitoneal pelvic/presacral catheter drainage 3/22/2022  ID recommendation - IV Ertapenem until 5/9/2022  IR recommendation - flush drain daily  PICC line placed today      Case management for discharge planning    Fistula- (present on admission)  Assessment & Plan  Follow up with Colorectal surgery as outpatient    SBO (small bowel obstruction) (HCC)- (present on admission)  Assessment & Plan  Resolved    Hypomagnesemia- (present on admission)  Assessment & Plan  oral Mg  Follow level    Rectal mass- (present on admission)  Assessment & Plan  History of colorectal cancer  Follow-up with colorectal surgery as outpatient    Hypokalemia- (present on admission)  Assessment & Plan  Follow BMP    Tobacco chew use- (present on admission)  Assessment & Plan  Education and counseling       VTE prophylaxis: enoxaparin ppx    I have performed a physical exam and reviewed and updated ROS and Plan today (4/1/2022). In review of yesterday's note (3/31/2022), there are no changes except as documented above.

## 2022-04-02 ENCOUNTER — APPOINTMENT (OUTPATIENT)
Dept: RADIOLOGY | Facility: MEDICAL CENTER | Age: 52
DRG: 372 | End: 2022-04-02
Attending: FAMILY MEDICINE
Payer: COMMERCIAL

## 2022-04-02 LAB
ANION GAP SERPL CALC-SCNC: 10 MMOL/L (ref 7–16)
BASOPHILS # BLD AUTO: 0.8 % (ref 0–1.8)
BASOPHILS # BLD: 0.07 K/UL (ref 0–0.12)
BUN SERPL-MCNC: 6 MG/DL (ref 8–22)
CALCIUM SERPL-MCNC: 8.7 MG/DL (ref 8.5–10.5)
CHLORIDE SERPL-SCNC: 102 MMOL/L (ref 96–112)
CO2 SERPL-SCNC: 24 MMOL/L (ref 20–33)
CREAT SERPL-MCNC: 0.88 MG/DL (ref 0.5–1.4)
EOSINOPHIL # BLD AUTO: 0.14 K/UL (ref 0–0.51)
EOSINOPHIL NFR BLD: 1.7 % (ref 0–6.9)
ERYTHROCYTE [DISTWIDTH] IN BLOOD BY AUTOMATED COUNT: 43.6 FL (ref 35.9–50)
GFR SERPLBLD CREATININE-BSD FMLA CKD-EPI: 105 ML/MIN/1.73 M 2
GLUCOSE SERPL-MCNC: 105 MG/DL (ref 65–99)
HCT VFR BLD AUTO: 38.9 % (ref 42–52)
HGB BLD-MCNC: 13.5 G/DL (ref 14–18)
IMM GRANULOCYTES # BLD AUTO: 0.03 K/UL (ref 0–0.11)
IMM GRANULOCYTES NFR BLD AUTO: 0.4 % (ref 0–0.9)
LYMPHOCYTES # BLD AUTO: 2 K/UL (ref 1–4.8)
LYMPHOCYTES NFR BLD: 23.9 % (ref 22–41)
MCH RBC QN AUTO: 31.1 PG (ref 27–33)
MCHC RBC AUTO-ENTMCNC: 34.7 G/DL (ref 33.7–35.3)
MCV RBC AUTO: 89.6 FL (ref 81.4–97.8)
MONOCYTES # BLD AUTO: 0.47 K/UL (ref 0–0.85)
MONOCYTES NFR BLD AUTO: 5.6 % (ref 0–13.4)
NEUTROPHILS # BLD AUTO: 5.66 K/UL (ref 1.82–7.42)
NEUTROPHILS NFR BLD: 67.6 % (ref 44–72)
NRBC # BLD AUTO: 0 K/UL
NRBC BLD-RTO: 0 /100 WBC
PLATELET # BLD AUTO: 260 K/UL (ref 164–446)
PMV BLD AUTO: 9 FL (ref 9–12.9)
POTASSIUM SERPL-SCNC: 3.3 MMOL/L (ref 3.6–5.5)
RBC # BLD AUTO: 4.34 M/UL (ref 4.7–6.1)
SODIUM SERPL-SCNC: 136 MMOL/L (ref 135–145)
WBC # BLD AUTO: 8.4 K/UL (ref 4.8–10.8)

## 2022-04-02 PROCEDURE — 700101 HCHG RX REV CODE 250: Performed by: FAMILY MEDICINE

## 2022-04-02 PROCEDURE — 700111 HCHG RX REV CODE 636 W/ 250 OVERRIDE (IP): Performed by: INTERNAL MEDICINE

## 2022-04-02 PROCEDURE — 700102 HCHG RX REV CODE 250 W/ 637 OVERRIDE(OP): Performed by: HOSPITALIST

## 2022-04-02 PROCEDURE — 85025 COMPLETE CBC W/AUTO DIFF WBC: CPT

## 2022-04-02 PROCEDURE — 36415 COLL VENOUS BLD VENIPUNCTURE: CPT

## 2022-04-02 PROCEDURE — 700111 HCHG RX REV CODE 636 W/ 250 OVERRIDE (IP): Performed by: HOSPITALIST

## 2022-04-02 PROCEDURE — 700105 HCHG RX REV CODE 258: Performed by: INTERNAL MEDICINE

## 2022-04-02 PROCEDURE — 80048 BASIC METABOLIC PNL TOTAL CA: CPT

## 2022-04-02 PROCEDURE — 74019 RADEX ABDOMEN 2 VIEWS: CPT

## 2022-04-02 PROCEDURE — 700102 HCHG RX REV CODE 250 W/ 637 OVERRIDE(OP): Performed by: FAMILY MEDICINE

## 2022-04-02 PROCEDURE — A9270 NON-COVERED ITEM OR SERVICE: HCPCS | Performed by: HOSPITALIST

## 2022-04-02 PROCEDURE — A9270 NON-COVERED ITEM OR SERVICE: HCPCS | Performed by: FAMILY MEDICINE

## 2022-04-02 PROCEDURE — 770001 HCHG ROOM/CARE - MED/SURG/GYN PRIV*

## 2022-04-02 PROCEDURE — 99232 SBSQ HOSP IP/OBS MODERATE 35: CPT | Performed by: INTERNAL MEDICINE

## 2022-04-02 PROCEDURE — 99233 SBSQ HOSP IP/OBS HIGH 50: CPT | Performed by: FAMILY MEDICINE

## 2022-04-02 RX ORDER — SODIUM CHLORIDE AND POTASSIUM CHLORIDE 150; 900 MG/100ML; MG/100ML
INJECTION, SOLUTION INTRAVENOUS CONTINUOUS
Status: DISCONTINUED | OUTPATIENT
Start: 2022-04-02 | End: 2022-04-04

## 2022-04-02 RX ADMIN — MORPHINE SULFATE 2 MG: 4 INJECTION INTRAVENOUS at 09:01

## 2022-04-02 RX ADMIN — MORPHINE SULFATE 2 MG: 4 INJECTION INTRAVENOUS at 20:46

## 2022-04-02 RX ADMIN — HYDROCODONE BITARTRATE AND ACETAMINOPHEN 1 TABLET: 5; 325 TABLET ORAL at 12:30

## 2022-04-02 RX ADMIN — MORPHINE SULFATE 2 MG: 4 INJECTION INTRAVENOUS at 02:01

## 2022-04-02 RX ADMIN — PIPERACILLIN AND TAZOBACTAM 3.38 G: 3; .375 INJECTION, POWDER, LYOPHILIZED, FOR SOLUTION INTRAVENOUS; PARENTERAL at 12:31

## 2022-04-02 RX ADMIN — PIPERACILLIN AND TAZOBACTAM 3.38 G: 3; .375 INJECTION, POWDER, LYOPHILIZED, FOR SOLUTION INTRAVENOUS; PARENTERAL at 05:33

## 2022-04-02 RX ADMIN — SENNOSIDES AND DOCUSATE SODIUM 2 TABLET: 50; 8.6 TABLET ORAL at 05:34

## 2022-04-02 RX ADMIN — Medication 400 MG: at 05:34

## 2022-04-02 RX ADMIN — ENOXAPARIN SODIUM 40 MG: 40 INJECTION SUBCUTANEOUS at 16:16

## 2022-04-02 RX ADMIN — SENNOSIDES AND DOCUSATE SODIUM 2 TABLET: 50; 8.6 TABLET ORAL at 20:47

## 2022-04-02 RX ADMIN — POTASSIUM CHLORIDE AND SODIUM CHLORIDE: 900; 150 INJECTION, SOLUTION INTRAVENOUS at 09:59

## 2022-04-02 RX ADMIN — POTASSIUM CHLORIDE AND SODIUM CHLORIDE: 900; 150 INJECTION, SOLUTION INTRAVENOUS at 20:47

## 2022-04-02 RX ADMIN — HYDROCODONE BITARTRATE AND ACETAMINOPHEN 1 TABLET: 5; 325 TABLET ORAL at 05:34

## 2022-04-02 RX ADMIN — MORPHINE SULFATE 2 MG: 4 INJECTION INTRAVENOUS at 16:29

## 2022-04-02 RX ADMIN — PIPERACILLIN AND TAZOBACTAM 3.38 G: 3; .375 INJECTION, POWDER, LYOPHILIZED, FOR SOLUTION INTRAVENOUS; PARENTERAL at 20:47

## 2022-04-02 RX ADMIN — HYDROCODONE BITARTRATE AND ACETAMINOPHEN 1 TABLET: 5; 325 TABLET ORAL at 22:28

## 2022-04-02 ASSESSMENT — ENCOUNTER SYMPTOMS
BACK PAIN: 0
SHORTNESS OF BREATH: 0
WHEEZING: 0
WEAKNESS: 0
CHILLS: 0
HEARTBURN: 0
NAUSEA: 1
HEADACHES: 0
BLURRED VISION: 0
FLANK PAIN: 0
SORE THROAT: 0
COUGH: 0
SPEECH CHANGE: 0
ABDOMINAL PAIN: 1
SENSORY CHANGE: 0
DIZZINESS: 0
DIARRHEA: 0
NECK PAIN: 0
PALPITATIONS: 0
NERVOUS/ANXIOUS: 0
FEVER: 0
VOMITING: 0
DIAPHORESIS: 0
FOCAL WEAKNESS: 0
MYALGIAS: 0

## 2022-04-02 NOTE — PROGRESS NOTES
[facility] Medicine Daily Progress Note    Date of Service  4/2/2022    Chief Complaint  Beau Mcintyre is a 51 y.o. male admitted 3/21/2022 with abdominal fluid collection.    Hospital Course  Admitted as a transfer for small bowel obstruction and abdominal fluid collection.  The small bowel obstruction spontaneously resolved.  For the abdominal fluid collection, surgery was consulted on the case.  Patient underwent CT-guided retroperitoneal pelvic/presacral catheter drainage on 3/22/2022 by interventional radiology.  He was started on empiric coverage with IV Zosyn.  Fluid culture showed E. coli which was pansensitive.  Infectious disease was consulted on the case.  Further work-up showed the presence of a fistula on abscessogram.  On discussion with infectious disease, and surgery, interventional radiology, the drain will need to remain.  He will require a prolonged IV antibiotic course.    Interval Problem Update  Abdominal fluid collection - drain to remain, pain controlled  Fistula - noted on abscess-o-gram  SBO -patient noted abdominal bloating and distention yesterday late afternoon, CT scan of the abdomen pelvis shows partial obstruction, x-ray today shows partial obstruction versus ileus, he did have ostomy output today, surgery notified of change  Low potassium    I have personally seen and examined the patient at bedside. I discussed the plan of care with patient, bedside RN and general surgery.    Consultants/Specialty  general surgery, infectious disease and Interventional radiology    Code Status  Full Code    Disposition  Patient is not medically cleared for discharge.   Anticipate discharge to to home with organized home healthcare and close outpatient follow-up.  I have placed the appropriate orders for post-discharge needs.    Review of Systems  Review of Systems   Constitutional: Negative for chills, diaphoresis, fever and malaise/fatigue.   HENT: Negative for congestion, hearing loss and sore  throat.    Eyes: Negative for blurred vision.   Respiratory: Negative for cough, shortness of breath and wheezing.    Cardiovascular: Negative for chest pain, palpitations and leg swelling.   Gastrointestinal: Positive for abdominal pain and nausea. Negative for diarrhea, heartburn and vomiting.   Genitourinary: Negative for dysuria, flank pain and hematuria.   Musculoskeletal: Negative for back pain, joint pain, myalgias and neck pain.   Skin: Negative for rash.   Neurological: Negative for dizziness, sensory change, speech change, focal weakness, weakness and headaches.   Psychiatric/Behavioral: The patient is not nervous/anxious.         Physical Exam  Temp:  [36.4 °C (97.6 °F)-37 °C (98.6 °F)] 36.7 °C (98.1 °F)  Pulse:  [60-65] 62  Resp:  [16-18] 18  BP: ()/(53-81) 99/69  SpO2:  [93 %-96 %] 95 %    Physical Exam  Vitals and nursing note reviewed.   HENT:      Head: Normocephalic and atraumatic.      Nose: No congestion.      Mouth/Throat:      Mouth: Mucous membranes are moist.   Eyes:      Extraocular Movements: Extraocular movements intact.      Conjunctiva/sclera: Conjunctivae normal.   Cardiovascular:      Rate and Rhythm: Normal rate and regular rhythm.   Pulmonary:      Effort: Pulmonary effort is normal.      Breath sounds: Normal breath sounds.   Abdominal:      General: There is distension.      Tenderness: There is abdominal tenderness (mild, pelvic area). There is no guarding or rebound.      Comments: RLQ ostomy  Left gluteal drain   Musculoskeletal:      Cervical back: No tenderness.      Right lower leg: No edema.      Left lower leg: No edema.   Skin:     General: Skin is warm and dry.   Neurological:      General: No focal deficit present.      Mental Status: He is alert and oriented to person, place, and time.      Cranial Nerves: No cranial nerve deficit.         Fluids    Intake/Output Summary (Last 24 hours) at 4/2/2022 1123  Last data filed at 4/2/2022 0000  Gross per 24 hour   Intake  600 ml   Output 0 ml   Net 600 ml       Laboratory  Recent Labs     03/31/22 0317 04/01/22  0058 04/02/22  0215   WBC 5.3 7.3 8.4   RBC 4.31* 4.40* 4.34*   HEMOGLOBIN 13.3* 13.8* 13.5*   HEMATOCRIT 39.3* 39.4* 38.9*   MCV 91.2 89.5 89.6   MCH 30.9 31.4 31.1   MCHC 33.8 35.0 34.7   RDW 43.5 43.4 43.6   PLATELETCT 298 288 260   MPV 9.1 9.0 9.0     Recent Labs     03/31/22 0317 04/01/22  0058 04/02/22  0215   SODIUM 137 136 136   POTASSIUM 4.0 3.8 3.3*   CHLORIDE 106 105 102   CO2 19* 21 24   GLUCOSE 87 94 105*   BUN 6* 7* 6*   CREATININE 0.84 0.83 0.88   CALCIUM 8.7 8.5 8.7                   Imaging  [id number] VIEWS   Final Result      1.  Apparent mildly dilated loop of small bowel in the pelvis could represent partial or early obstruction versus ileus.      2.  No free air identified.      CT-ABDOMEN-PELVIS WITH   Final Result      1.  Interval increase in size of presacral fluid collection since prior exam dated 3/20/2022, with drainage catheter in place.   2.  Dilated bowel loops in the central pelvis suggesting at least partial obstruction.   3.  Postoperative change as described.         IR-PICC LINE PLACEMENT W/ GUIDANCE > AGE 5   Final Result                  Ultrasound-guided PICC placement performed by qualified nursing staff as    above.          [id number]   Final Result      1.  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      [id number] VIEW   Final Result      1.  Pigtail drainage catheter in place within the pelvis.   2.  Nonobstructive bowel gas pattern.      CT-PELVIS WITH   Final Result      1.  Right lower quadrant ileostomy.   2.  Left transgluteal posterior pelvic/presacral pigtail drainage catheter remains in place. No residual fluid collection evident about the pigtail loop.   3.  Enteric contrast opacifying pelvic small bowel loops. No appreciable communication of contrast into the presacral collection.   4.  Catheter abscessogram may be of interest if there is strong  clinical suspicion for a fistula.      CT-ABDOMEN-PELVIS WITH   Final Result         1. No contrast is seen within the presacral fluid collection to suggest fistulous connection.      CT-ABDOMEN&PELVIS ENTEROGRAPHY   Final Result      1.  Unchanged presacral/precoccygeal space fluid collection measuring 2.9 x 2.2 cm consistent with abscess      2.  Prior colectomy and there is a right lower quadrant ostomy      3.  No direct evidence for fistula although cannot be excluded with the negative contrast administered for enterography. Consider repeat CT scan with standard oral contrast when clinically appropriate      4.  Postoperative changes in the presacral/precoccygeal space      5.  Mass contiguous with the inferior aspect of rectal stump which could be tumor versus enlarged prostate.      6.  No other significant finding      CT-DRAIN-PERITONEAL   Final Result      1.  CT GUIDED RETROPERITONEAL PELVIC/PRESACRAL CATHETER DRAINAGE. INITIAL FLUID RETURN RESEMBLED ENTERIC CONTENTS. A FISTULOUS LEAK MAY BE PRESENT. LIKEWISE, THE AIR CONTENT SUGGESTS THE POSSIBILITY OF COMMUNICATION WITH BOWEL.   2.  THE CURRENT PLAN IS TO MONITOR DRAINAGE OUTPUT, IRRIGATE DAILY AND OBTAIN A FOLLOWUP CT SCAN IN 5-7 DAYS IF CLINICALLY INDICATED.   3. ABSCESSOGRAM MAY BE OF INTEREST PRIOR TO CATHETER REMOVAL TO DETERMINE IF THERE IS FISTULOUS COMMUNICATION WITH SMALL BOWEL IN THE PELVIS.      OUTSIDE IMAGES-CT ABDOMEN /PELVIS   Final Result           Assessment/Plan  * Abdominal fluid collection- (present on admission)  Assessment & Plan  CT-guided retroperitoneal pelvic/presacral catheter drainage 3/22/2022  ID recommendation - IV Ertapenem until 5/9/2022  IR recommendation - flush drain daily  PICC line placed today      Case management for discharge planning    Fistula- (present on admission)  Assessment & Plan  Follow up with Colorectal surgery as outpatient    SBO (small bowel obstruction) (HCC)- (present on admission)  Assessment &  Plan  reconsult Surgery  NPO for now  IVF hydration    Hypomagnesemia- (present on admission)  Assessment & Plan  Follow level    Rectal mass- (present on admission)  Assessment & Plan  History of colorectal cancer  Follow-up with colorectal surgery as outpatient    Hypokalemia- (present on admission)  Assessment & Plan  IV KCl  Follow BMP    Tobacco chew use- (present on admission)  Assessment & Plan  Education and counseling       VTE prophylaxis: enoxaparin ppx    I have performed a physical exam and reviewed and updated ROS and Plan today (4/2/2022). In review of yesterday's note (4/1/2022), there are no changes except as documented above.

## 2022-04-02 NOTE — CARE PLAN
Problem: Knowledge Deficit - Standard  Goal: Patient and family/care givers will demonstrate understanding of plan of care, disease process/condition, diagnostic tests and medications  Outcome: Progressing     Problem: Pain - Standard  Goal: Alleviation of pain or a reduction in pain to the patient’s comfort goal  Outcome: Progressing     Problem: Psychosocial  Goal: Patient's level of anxiety will decrease  Outcome: Progressing   The patient is Stable - Low risk of patient condition declining or worsening    Shift Goals  Clinical Goals: drain care, pain management  Patient Goals: pain control  Family Goals: No family present at this time    Progress made toward(s) clinical / shift goals:  medicating for pain PRN per MAR, denies nausea, c/o hunger, teaching drain care to patient    Patient is not progressing towards the following goals:

## 2022-04-02 NOTE — PROGRESS NOTES
Pt is A&O 4  Pain + medicated per MAR   + nausea medicated per MAR  Tolerating a Regular diet   Incision -  + Drains IR to the L buttock/lower back   + Voids  + flatus  + BM  Up self  SCD's off  Bed alarm off, pt low fall risk per irais kan  Reviewed plan of care with patient, bed in lowest position and locked, pt resting comfortably now, call light within reach, all needs met at this time. Interventions will be executed per plan of care

## 2022-04-02 NOTE — DISCHARGE PLANNING
"    Date: 4/2/2022      Anticipated Discharge Disposition: DC home with IVABX       Action: CM met with patient , at bedside.  CM inquired if patient is \"service connected\".  He does not believe he is, however, he has had his cancer treatments there a \"few years back\".  [facility] confirmed [facility] the past [facility]\".      CM discussed with patient to make a \"video appointment with [facility] while here.\"  Once MD has made a video visit then \"that MD may be agreeable to sign the VA- IVABX tx plan orders\".  CM to follow up on Monday, April 4th, with the [facility] for a possible appointment.          Barriers to Discharge:     1) In order for the [facility] to cover IVABX plan, the VA PCP has to sign orders; patient has not seen the MD in over a year, and patient does not remember \"which doctor\".     2) patient has Medicare A&B which would cover an infusion center; need to confirm if infusion center at his local [facility] hospital would bill Medicare for needed IV-infusion.  CM to follow up.  3) ONLY VA credentialed MDs can sign the IVABX orders for VA coverage of the medication       Plan: CM to follow up with the [facility] and with [facility] to confirm coverage options.       Juan Miguel [address] [address]  Main phone: [phone]        Preeti Acharya RN, BSN, CM  Case Management  \"[facility] [employer]\"  E-mail: [email or web address]  Phone: [phone]    "

## 2022-04-02 NOTE — PROGRESS NOTES
Infectious Disease Progress Note    Author: Hilaria Ho M.D. Date & Time of service: 2022  1:19 PM    Chief Complaint:  abd abscess  Small bowel fistula fistula      Interval History:  51 y.o. male with h/o recurrent colon cancer admitted 3/21/2022 as transfer from [place] for N/V due to SBO. Found to have presacral abscess and probable fistula  3/25 AF WBC 5.8 tolerating some PO No immediate plan for surgery  3/26 AF WBC 5.9 tolerating diet well-feeling much better. Denies SE abx  3/28 AF WBC 5.3 plan for repeat CT today  3/29 AF eating well planned for repeat imaging  3/30 AF down for abscessogram-results reviewed +small fistula  3/31 AF tolerating abx Just got midline placed-per CM going to [facility] in [place]   AF WBC 8.4 sleeping soundly at time of rounds. Patient with [facility]-difficult discharge    Labs Reviewed, Medications Reviewed     Review of Systems:  Review of Systems   Unable to perform ROS: Other   Constitutional: Negative for fever.   Respiratory: Negative for shortness of breath.        Hemodynamics:  Temp (24hrs), Av.7 °C (98 °F), Min:36.4 °C (97.6 °F), Max:37 °C (98.6 °F)  Temperature: 36.7 °C (98.1 °F)  Pulse  Av.7  Min: 47  Max: 112   Blood Pressure: (!) 99/69       Physical Exam:  Physical Exam  Constitutional:       General: He is not in acute distress.     Appearance: He is not toxic-appearing or diaphoretic.   Cardiovascular:      Rate and Rhythm: Normal rate.   Pulmonary:      Effort: Pulmonary effort is normal. No respiratory distress.   Abdominal:      Comments: drain   Musculoskeletal:      Comments: PICC RUE   Neurological:      Comments: sleeping         Meds:    Current Facility-Administered Medications:   •  0.9 % NaCl with KCl 20 mEq 1,000 mL  •  [DISCONTINUED] piperacillin-tazobactam **AND** piperacillin-tazobactam  •  HYDROcodone-acetaminophen  •  diphenhydrAMINE  •  morphine injection  •  senna-docusate **AND** polyethylene glycol/lytes **AND** magnesium  hydroxide **AND** bisacodyl  •  enoxaparin (LOVENOX) injection  •  acetaminophen  •  Notify provider if pain remains uncontrolled **AND** Use the Numeric Rating Scale (NRS), Ervin-Baker Faces (WBF), or FLACC on regular floors and Critical-Care Pain Observation Tool (CPOT) on ICUs/Trauma to assess pain **AND** Pulse Ox **AND** Pharmacy Consult Request **AND** If patient difficult to arouse and/or has respiratory depression (respiratory rate of 10 or less), stop any opiates that are currently infusing and call a Rapid Response.  •  labetalol  •  ondansetron  •  ondansetron  •  promethazine  •  promethazine  •  prochlorperazine    Labs:  Recent Labs     03/31/22 0317 04/01/22 0058 04/02/22 0215   WBC 5.3 7.3 8.4   RBC 4.31* 4.40* 4.34*   HEMOGLOBIN 13.3* 13.8* 13.5*   HEMATOCRIT 39.3* 39.4* 38.9*   MCV 91.2 89.5 89.6   MCH 30.9 31.4 31.1   RDW 43.5 43.4 43.6   PLATELETCT 298 288 260   MPV 9.1 9.0 9.0   NEUTSPOLYS 48.50 59.60 67.60   LYMPHOCYTES 40.10 29.20 23.90   MONOCYTES 7.40 7.70 5.60   EOSINOPHILS 2.30 2.10 1.70   BASOPHILS 1.30 1.00 0.80     Recent Labs     03/31/22 0317 04/01/22 0058 04/02/22 0215   SODIUM 137 136 136   POTASSIUM 4.0 3.8 3.3*   CHLORIDE 106 105 102   CO2 19* 21 24   GLUCOSE 87 94 105*   BUN 6* 7* 6*     Recent Labs     03/31/22 0317 04/01/22 0058 04/02/22 0215   CREATININE 0.84 0.83 0.88       Imaging:  CT-DRAIN-PERITONEAL    Result Date: 3/22/2022  3/22/2022 4:31 PM HISTORY/REASON FOR EXAM:  Noted intra-abdominal fluid collection near bladder on outside CT scan. Presacral fluid collection. History of total colectomy. Right lower quadrant ileostomy. Intermittent small bowel obstruction. TECHNIQUE/EXAM DESCRIPTION: Pelvic (presacral/retroperitoneal) abscess drainage with CT guidance. Low dose optimization technique was utilized for this CT exam including automated exposure control and adjustment of the mA and/or kV according to patient size. PROCEDURE: Informed consent was obtained.  Localizing CT images were obtained with the patient in prone position. The skin was prepped with ChloraPrep and draped in a sterile fashion. Moderate sedation was provided. Pulse oximetry and continuous cardiac monitoring by the nurse was performed throughout the exam. Intraservice time was 30 minutes. Following local anesthesia with 1% Lidocaine, a 17 G guiding needle was placed and needle path via a left transgluteal approach confirmed with CT. An Amplatz guidewire was placed and following serial tract dilatation, a 8 Georgian pigtail locking catheter was placed. A specimen was collected and submitted for culture and sensitivity and Gram stain. Total of 10 mL initially butterscotch colored fluid turning to pink opaque fluid was drained. The initial fluid returned resembled enteric contents. The catheter was secured to the skin and connected to suction bulb drainage. Final CT images were obtained documenting catheter position. The patient tolerated the procedure well with no evidence of complication. COMPARISON: CT of the abdomen and pelvis outside films 3/20/2022 FINDINGS: Initial CT images again show an approximately 3.7 cm presacral collection with an air-fluid level. The final CT images show satisfactory catheter position within the targeted presacral collection.     1.  CT GUIDED RETROPERITONEAL PELVIC/PRESACRAL CATHETER DRAINAGE. INITIAL FLUID RETURN RESEMBLED ENTERIC CONTENTS. A FISTULOUS LEAK MAY BE PRESENT. LIKEWISE, THE AIR CONTENT SUGGESTS THE POSSIBILITY OF COMMUNICATION WITH BOWEL. 2.  THE CURRENT PLAN IS TO MONITOR DRAINAGE OUTPUT, IRRIGATE DAILY AND OBTAIN A FOLLOWUP CT SCAN IN 5-7 DAYS IF CLINICALLY INDICATED. 3. ABSCESSOGRAM MAY BE OF INTEREST PRIOR TO CATHETER REMOVAL TO DETERMINE IF THERE IS FISTULOUS COMMUNICATION WITH SMALL BOWEL IN THE PELVIS.    CT-ABDOMEN&PELVIS ENTEROGRAPHY    Result Date: 3/25/2022  CT abdomen and pelvis with contrast 3/24/2022 1725 hours is HISTORY/REASON FOR EXAM:  Bowel  obstruction suspected; Pelvic fluid collection - S/P IR drain placement.  Pt with Hx of total abdominal colectomy. possible small bowel fistula; Possible fistula to pelvic fluid collection TECHNIQUE/EXAM DESCRIPTION:  CT abdomen and pelvis with contrast, enterography protocol. Following oral administration of VoLumen oral contrast and water as well as intravenous administration of 100 mL of Omnipaque 350 nonionic contrast, thin section helical CT is performed from the level of the diaphragm through the ischial tuberosities. Axial, coronal, and sagittal images are sent to PACS. Low dose optimization technique was utilized for this CT exam including automated exposure control and adjustment of the mA and/or kV according to patient size. COMPARISON:  Outside CT abdomen and pelvis 3/20/2022 FINDINGS: The study was ordered and performed as CT enterography which utilizes negative oral contrast. This precludes assessment of a fistula. We called the patient's caregivers recommending that the scan be repeated and that is not possible at this time therefore, the scan is being interpreted as performed. LUNGS: The lung bases are clear. Left ventricle is probably dilated. Osseous structures:  No significant finding. Abdomen: LIVER: is normal in appearance. GALLBLADDER and BILIARY SYSTEM The gallbladder is normal in CT appearance. There is no biliary dilation. SPLEEN: Normal in appearance. PANCREAS: The pancreas is normal in appearance. The splenic vein and portal vein enhance normally. ADRENAL glands: Normal in appearance. RIGHT kidney: Normal in appearance. LEFT kidney: Normal in appearance. There is no hydronephrosis. BOWEL and MESENTERY: There is a right-sided ileostomy. There has been a colectomy. There is a presacral fluid collection containing a catheter. Fluid collection measures 2.9 x 2.2 cm consistent with an abscess and may indicate leakage. AORTA and VASCULATURE: Atherosclerosis without aneurysm. Pelvis: There is  "possibly a rectal mass versus prostate enlargement.     1.  Unchanged presacral/precoccygeal space fluid collection measuring 2.9 x 2.2 cm consistent with abscess 2.  Prior colectomy and there is a right lower quadrant ostomy 3.  No direct evidence for fistula although cannot be excluded with the negative contrast administered for enterography. Consider repeat CT scan with standard oral contrast when clinically appropriate 4.  Postoperative changes in the presacral/precoccygeal space 5.  Mass contiguous with the inferior aspect of rectal stump which could be tumor versus enlarged prostate. 6.  No other significant finding      Micro:  Results     Procedure Component Value Units Date/Time    BLOOD CULTURE [[id number]] Collected: 03/25/22 1603    Order Status: Completed Specimen: Blood from Peripheral Updated: 03/30/22 1700     Significant Indicator NEG     Source BLD     Site PERIPHERAL     Culture Result No growth after 5 days of incubation.    Narrative:      Per Hospital Policy: Only change Specimen Src: to \"Line\" if  specified by physician order.  Left Hand    BLOOD CULTURE [[id number]] Collected: 03/25/22 1448    Order Status: Completed Specimen: Blood from Peripheral Updated: 03/30/22 1700     Significant Indicator NEG     Source BLD     Site PERIPHERAL     Culture Result No growth after 5 days of incubation.    Narrative:      Per Hospital Policy: Only change Specimen Src: to \"Line\" if  specified by physician order.  Left Hand          Assessment:  Active Hospital Problems    Diagnosis    • *Small bowel obstruction (HCC) [K56.609]    • Abdominal fluid collection [R18.8]    • Tobacco chew use [Z72.0]    • Leukocytosis [D72.829]    • Hypokalemia [E87.6]      Recurrent SBO  H/o recurrent colon cancer-rectal mass on CT  Fistula  Intraabd abscess-Ecoli-not improved on CT 3/25  Leukocytosis resolved        PLAN:    CT 3/25 : Unchanged presacral/precoccygeal space fluid collection measuring 2.9 x 2.2 cm consistent with " abscess 2.  Prior colectomy and there is a right lower quadrant ostomy 3.  No direct evidence for fistula although cannot be excluded with the negative contrast administered for enterography. Consider repeat CT scan with standard oral contrast when clinically appropriate 4.  Postoperative changes in the presacral/precoccygeal space 5.  Mass contiguous with the inferior aspect of rectal stump which could be tumor versus enlarged prostate  Need for larger drain per IR. No current plan for surgery  CT 3/28 no obvious fistula-no residual fluid and scant drain output. Catheter abscessogram recommended    Abscessogram today shows fistula-plan per surgery is antibiotics, no surgery    As fistula present will require weeks of antibiotics + drain to hopefully promote spontaneous closure  Continue Zosyn while in the hospital  Orders sent to -Zosyn thru 5/9/2022 home infusion 3/30  New orders for ertapenem done 3/31-not accepted by [facility]  Drain and FU abscessogram per surgery  Further eval rectal stump mass per PCT    FU ID after discharge if allowed by insurance  DW CM  Will sign off  Please consult if needed

## 2022-04-03 LAB
ANION GAP SERPL CALC-SCNC: 10 MMOL/L (ref 7–16)
BASOPHILS # BLD AUTO: 0.7 % (ref 0–1.8)
BASOPHILS # BLD: 0.05 K/UL (ref 0–0.12)
BUN SERPL-MCNC: 6 MG/DL (ref 8–22)
CALCIUM SERPL-MCNC: 8.3 MG/DL (ref 8.5–10.5)
CHLORIDE SERPL-SCNC: 105 MMOL/L (ref 96–112)
CO2 SERPL-SCNC: 23 MMOL/L (ref 20–33)
CREAT SERPL-MCNC: 0.75 MG/DL (ref 0.5–1.4)
EOSINOPHIL # BLD AUTO: 0.14 K/UL (ref 0–0.51)
EOSINOPHIL NFR BLD: 2 % (ref 0–6.9)
ERYTHROCYTE [DISTWIDTH] IN BLOOD BY AUTOMATED COUNT: 46 FL (ref 35.9–50)
GFR SERPLBLD CREATININE-BSD FMLA CKD-EPI: 110 ML/MIN/1.73 M 2
GLUCOSE SERPL-MCNC: 100 MG/DL (ref 65–99)
HCT VFR BLD AUTO: 36.3 % (ref 42–52)
HGB BLD-MCNC: 12.4 G/DL (ref 14–18)
IMM GRANULOCYTES # BLD AUTO: 0.01 K/UL (ref 0–0.11)
IMM GRANULOCYTES NFR BLD AUTO: 0.1 % (ref 0–0.9)
LYMPHOCYTES # BLD AUTO: 1.99 K/UL (ref 1–4.8)
LYMPHOCYTES NFR BLD: 28.5 % (ref 22–41)
MAGNESIUM SERPL-MCNC: 1.8 MG/DL (ref 1.5–2.5)
MCH RBC QN AUTO: 32 PG (ref 27–33)
MCHC RBC AUTO-ENTMCNC: 34.2 G/DL (ref 33.7–35.3)
MCV RBC AUTO: 93.6 FL (ref 81.4–97.8)
MONOCYTES # BLD AUTO: 0.53 K/UL (ref 0–0.85)
MONOCYTES NFR BLD AUTO: 7.6 % (ref 0–13.4)
NEUTROPHILS # BLD AUTO: 4.26 K/UL (ref 1.82–7.42)
NEUTROPHILS NFR BLD: 61.1 % (ref 44–72)
NRBC # BLD AUTO: 0 K/UL
NRBC BLD-RTO: 0 /100 WBC
PLATELET # BLD AUTO: 231 K/UL (ref 164–446)
PMV BLD AUTO: 8.9 FL (ref 9–12.9)
POTASSIUM SERPL-SCNC: 3.6 MMOL/L (ref 3.6–5.5)
RBC # BLD AUTO: 3.88 M/UL (ref 4.7–6.1)
SODIUM SERPL-SCNC: 138 MMOL/L (ref 135–145)
WBC # BLD AUTO: 7 K/UL (ref 4.8–10.8)

## 2022-04-03 PROCEDURE — 700102 HCHG RX REV CODE 250 W/ 637 OVERRIDE(OP): Performed by: HOSPITALIST

## 2022-04-03 PROCEDURE — A9270 NON-COVERED ITEM OR SERVICE: HCPCS | Performed by: HOSPITALIST

## 2022-04-03 PROCEDURE — 770001 HCHG ROOM/CARE - MED/SURG/GYN PRIV*

## 2022-04-03 PROCEDURE — 700111 HCHG RX REV CODE 636 W/ 250 OVERRIDE (IP): Performed by: HOSPITALIST

## 2022-04-03 PROCEDURE — 80048 BASIC METABOLIC PNL TOTAL CA: CPT

## 2022-04-03 PROCEDURE — 700101 HCHG RX REV CODE 250: Performed by: FAMILY MEDICINE

## 2022-04-03 PROCEDURE — 700105 HCHG RX REV CODE 258: Performed by: INTERNAL MEDICINE

## 2022-04-03 PROCEDURE — 85025 COMPLETE CBC W/AUTO DIFF WBC: CPT

## 2022-04-03 PROCEDURE — 99232 SBSQ HOSP IP/OBS MODERATE 35: CPT | Performed by: FAMILY MEDICINE

## 2022-04-03 PROCEDURE — 700111 HCHG RX REV CODE 636 W/ 250 OVERRIDE (IP): Performed by: FAMILY MEDICINE

## 2022-04-03 PROCEDURE — 83735 ASSAY OF MAGNESIUM: CPT

## 2022-04-03 PROCEDURE — 700111 HCHG RX REV CODE 636 W/ 250 OVERRIDE (IP): Performed by: INTERNAL MEDICINE

## 2022-04-03 RX ORDER — MAGNESIUM SULFATE HEPTAHYDRATE 40 MG/ML
2 INJECTION, SOLUTION INTRAVENOUS ONCE
Status: COMPLETED | OUTPATIENT
Start: 2022-04-03 | End: 2022-04-03

## 2022-04-03 RX ORDER — MORPHINE SULFATE 4 MG/ML
2 INJECTION INTRAVENOUS ONCE
Status: COMPLETED | OUTPATIENT
Start: 2022-04-03 | End: 2022-04-03

## 2022-04-03 RX ADMIN — SENNOSIDES AND DOCUSATE SODIUM 2 TABLET: 50; 8.6 TABLET ORAL at 18:05

## 2022-04-03 RX ADMIN — POTASSIUM CHLORIDE AND SODIUM CHLORIDE: 900; 150 INJECTION, SOLUTION INTRAVENOUS at 11:54

## 2022-04-03 RX ADMIN — PIPERACILLIN AND TAZOBACTAM 3.38 G: 3; .375 INJECTION, POWDER, LYOPHILIZED, FOR SOLUTION INTRAVENOUS; PARENTERAL at 22:30

## 2022-04-03 RX ADMIN — HYDROCODONE BITARTRATE AND ACETAMINOPHEN 1 TABLET: 5; 325 TABLET ORAL at 18:05

## 2022-04-03 RX ADMIN — MORPHINE SULFATE 2 MG: 4 INJECTION INTRAVENOUS at 20:40

## 2022-04-03 RX ADMIN — PIPERACILLIN AND TAZOBACTAM 3.38 G: 3; .375 INJECTION, POWDER, LYOPHILIZED, FOR SOLUTION INTRAVENOUS; PARENTERAL at 04:36

## 2022-04-03 RX ADMIN — HYDROCODONE BITARTRATE AND ACETAMINOPHEN 1 TABLET: 5; 325 TABLET ORAL at 04:35

## 2022-04-03 RX ADMIN — MORPHINE SULFATE 2 MG: 4 INJECTION INTRAVENOUS at 08:22

## 2022-04-03 RX ADMIN — SENNOSIDES AND DOCUSATE SODIUM 2 TABLET: 50; 8.6 TABLET ORAL at 04:35

## 2022-04-03 RX ADMIN — MORPHINE SULFATE 2 MG: 4 INJECTION INTRAVENOUS at 14:13

## 2022-04-03 RX ADMIN — MAGNESIUM SULFATE HEPTAHYDRATE 2 G: 40 INJECTION, SOLUTION INTRAVENOUS at 08:03

## 2022-04-03 RX ADMIN — MORPHINE SULFATE 2 MG: 4 INJECTION INTRAVENOUS at 06:36

## 2022-04-03 RX ADMIN — ENOXAPARIN SODIUM 40 MG: 40 INJECTION SUBCUTANEOUS at 14:14

## 2022-04-03 RX ADMIN — DIPHENHYDRAMINE HYDROCHLORIDE 25 MG: 25 TABLET ORAL at 01:02

## 2022-04-03 RX ADMIN — PIPERACILLIN AND TAZOBACTAM 3.38 G: 3; .375 INJECTION, POWDER, LYOPHILIZED, FOR SOLUTION INTRAVENOUS; PARENTERAL at 13:06

## 2022-04-03 RX ADMIN — MORPHINE SULFATE 2 MG: 4 INJECTION INTRAVENOUS at 01:02

## 2022-04-03 ASSESSMENT — ENCOUNTER SYMPTOMS
CHILLS: 0
PALPITATIONS: 0
COUGH: 0
VOMITING: 0
FOCAL WEAKNESS: 0
HEARTBURN: 0
NERVOUS/ANXIOUS: 0
HEADACHES: 0
DIARRHEA: 0
ABDOMINAL PAIN: 1
SPEECH CHANGE: 0
WEAKNESS: 0
FEVER: 0
SENSORY CHANGE: 0
NECK PAIN: 0
SORE THROAT: 0
NAUSEA: 0
FLANK PAIN: 0
DIZZINESS: 0
DIAPHORESIS: 0
BACK PAIN: 0
SHORTNESS OF BREATH: 0
BLURRED VISION: 0
WHEEZING: 0
MYALGIAS: 0

## 2022-04-03 ASSESSMENT — PAIN DESCRIPTION - PAIN TYPE
TYPE: ACUTE PAIN
TYPE: ACUTE PAIN

## 2022-04-03 NOTE — PROGRESS NOTES
This RN bedside, patient complaining of 8/10 pain that has been difficult to control.  Patient states that he's told RN staff for the past couple shifts that the pain is out of control.  Next PRN >2hrs away.    Contacted MD Yates via [employer] at 0814 hrs - new orders received

## 2022-04-03 NOTE — PROGRESS NOTES
Hospital Medicine Daily Progress Note    Date of Service  4/3/2022    Chief Complaint  Beau Mcintyre is a 51 y.o. male admitted 3/21/2022 with abdominal fluid collection.    Hospital Course  Admitted as a transfer for small bowel obstruction and abdominal fluid collection.  The small bowel obstruction spontaneously resolved.  For the abdominal fluid collection, surgery was consulted on the case.  Patient underwent CT-guided retroperitoneal pelvic/presacral catheter drainage on 3/22/2022 by interventional radiology.  He was started on empiric coverage with IV Zosyn.  Fluid culture showed E. coli which was pansensitive.  Infectious disease was consulted on the case.  Further work-up showed the presence of a fistula on abscessogram.  On discussion with infectious disease, and surgery, interventional radiology, the drain will need to remain.  He will require a prolonged IV antibiotic course.    Interval Problem Update  Abdominal fluid collection - drain to remain  Fistula - noted on abscess-o-gram  SBO -partial obstruction appears to have resolved again, tolerating regular diet, good ostomy output  Low magnesium    I have personally seen and examined the patient at bedside. I discussed the plan of care with patient and bedside RN.    Consultants/Specialty  general surgery, infectious disease and Interventional radiology    Code Status  Full Code    Disposition  Patient is not medically cleared for discharge.   Anticipate discharge to to home with organized home healthcare and close outpatient follow-up.  I have placed the appropriate orders for post-discharge needs.    Review of Systems  Review of Systems   Constitutional: Negative for chills, diaphoresis, fever and malaise/fatigue.   HENT: Negative for congestion, hearing loss and sore throat.    Eyes: Negative for blurred vision.   Respiratory: Negative for cough, shortness of breath and wheezing.    Cardiovascular: Negative for chest pain, palpitations and leg  swelling.   Gastrointestinal: Positive for abdominal pain. Negative for diarrhea, heartburn, nausea and vomiting.   Genitourinary: Negative for dysuria, flank pain and hematuria.   Musculoskeletal: Negative for back pain, joint pain, myalgias and neck pain.   Skin: Negative for rash.   Neurological: Negative for dizziness, sensory change, speech change, focal weakness, weakness and headaches.   Psychiatric/Behavioral: The patient is not nervous/anxious.         Physical Exam  Temp:  [36.5 °C (97.7 °F)-36.9 °C (98.5 °F)] 36.9 °C (98.5 °F)  Pulse:  [62-80] 62  Resp:  [16-18] 18  BP: ()/(68-76) 101/74  SpO2:  [93 %-95 %] 95 %    Physical Exam  Vitals and nursing note reviewed.   HENT:      Head: Normocephalic and atraumatic.      Nose: No congestion.      Mouth/Throat:      Mouth: Mucous membranes are moist.   Eyes:      Extraocular Movements: Extraocular movements intact.      Conjunctiva/sclera: Conjunctivae normal.   Cardiovascular:      Rate and Rhythm: Normal rate and regular rhythm.   Pulmonary:      Effort: Pulmonary effort is normal.      Breath sounds: Normal breath sounds.   Abdominal:      General: There is distension.      Tenderness: There is abdominal tenderness (mild, pelvic area). There is no guarding or rebound.      Comments: RLQ ostomy  Left gluteal drain   Musculoskeletal:      Cervical back: No tenderness.      Right lower leg: No edema.      Left lower leg: No edema.   Skin:     General: Skin is warm and dry.   Neurological:      General: No focal deficit present.      Mental Status: He is alert and oriented to person, place, and time.      Cranial Nerves: No cranial nerve deficit.         Fluids    Intake/Output Summary (Last 24 hours) at 4/3/2022 1348  Last data filed at 4/3/2022 0800  Gross per 24 hour   Intake 1285 ml   Output 10 ml   Net 1275 ml       Laboratory  Recent Labs     04/01/22  0058 04/02/22  0215 04/03/22  0104   WBC 7.3 8.4 7.0   RBC 4.40* 4.34* 3.88*   HEMOGLOBIN 13.8*  13.5* 12.4*   HEMATOCRIT 39.4* 38.9* 36.3*   MCV 89.5 89.6 93.6   MCH 31.4 31.1 32.0   MCHC 35.0 34.7 34.2   RDW 43.4 43.6 46.0   PLATELETCT 288 260 231   MPV 9.0 9.0 8.9*     Recent Labs     04/01/22  0058 04/02/22  0215 04/03/22  0104   SODIUM 136 136 138   POTASSIUM 3.8 3.3* 3.6   CHLORIDE 105 102 105   CO2 21 24 23   GLUCOSE 94 105* 100*   BUN 7* 6* 6*   CREATININE 0.83 0.88 0.75   CALCIUM 8.5 8.7 8.3*                   Imaging  [id number] VIEWS   Final Result      1.  Apparent mildly dilated loop of small bowel in the pelvis could represent partial or early obstruction versus ileus.      2.  No free air identified.      CT-ABDOMEN-PELVIS WITH   Final Result      1.  Interval increase in size of presacral fluid collection since prior exam dated 3/20/2022, with drainage catheter in place.   2.  Dilated bowel loops in the central pelvis suggesting at least partial obstruction.   3.  Postoperative change as described.         IR-PICC LINE PLACEMENT W/ GUIDANCE > AGE 5   Final Result                  Ultrasound-guided PICC placement performed by qualified nursing staff as    above.          [id number]   Final Result      1.  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      [id number] VIEW   Final Result      1.  Pigtail drainage catheter in place within the pelvis.   2.  Nonobstructive bowel gas pattern.      CT-PELVIS WITH   Final Result      1.  Right lower quadrant ileostomy.   2.  Left transgluteal posterior pelvic/presacral pigtail drainage catheter remains in place. No residual fluid collection evident about the pigtail loop.   3.  Enteric contrast opacifying pelvic small bowel loops. No appreciable communication of contrast into the presacral collection.   4.  Catheter abscessogram may be of interest if there is strong clinical suspicion for a fistula.      CT-ABDOMEN-PELVIS WITH   Final Result         1. No contrast is seen within the presacral fluid collection to suggest fistulous  connection.      CT-ABDOMEN&PELVIS ENTEROGRAPHY   Final Result      1.  Unchanged presacral/precoccygeal space fluid collection measuring 2.9 x 2.2 cm consistent with abscess      2.  Prior colectomy and there is a right lower quadrant ostomy      3.  No direct evidence for fistula although cannot be excluded with the negative contrast administered for enterography. Consider repeat CT scan with standard oral contrast when clinically appropriate      4.  Postoperative changes in the presacral/precoccygeal space      5.  Mass contiguous with the inferior aspect of rectal stump which could be tumor versus enlarged prostate.      6.  No other significant finding      CT-DRAIN-PERITONEAL   Final Result      1.  CT GUIDED RETROPERITONEAL PELVIC/PRESACRAL CATHETER DRAINAGE. INITIAL FLUID RETURN RESEMBLED ENTERIC CONTENTS. A FISTULOUS LEAK MAY BE PRESENT. LIKEWISE, THE AIR CONTENT SUGGESTS THE POSSIBILITY OF COMMUNICATION WITH BOWEL.   2.  THE CURRENT PLAN IS TO MONITOR DRAINAGE OUTPUT, IRRIGATE DAILY AND OBTAIN A FOLLOWUP CT SCAN IN 5-7 DAYS IF CLINICALLY INDICATED.   3. ABSCESSOGRAM MAY BE OF INTEREST PRIOR TO CATHETER REMOVAL TO DETERMINE IF THERE IS FISTULOUS COMMUNICATION WITH SMALL BOWEL IN THE PELVIS.      OUTSIDE IMAGES-CT ABDOMEN /PELVIS   Final Result           Assessment/Plan  * Abdominal fluid collection- (present on admission)  Assessment & Plan  CT-guided retroperitoneal pelvic/presacral catheter drainage 3/22/2022  ID recommendation - IV Ertapenem until 5/9/2022  IR recommendation - flush drain daily  PICC line placed today      Case management for discharge planning    Fistula- (present on admission)  Assessment & Plan  Follow up with Colorectal surgery as outpatient    SBO (small bowel obstruction) (HCC)- (present on admission)  Assessment & Plan  Monitor closely    Hypomagnesemia- (present on admission)  Assessment & Plan  IV Mg 2 g  Follow level    Rectal mass- (present on admission)  Assessment &  Plan  History of colorectal cancer  Follow-up with colorectal surgery as outpatient    Hypokalemia- (present on admission)  Assessment & Plan  IV KCl  Follow BMP    Tobacco chew use- (present on admission)  Assessment & Plan  Education and counseling       VTE prophylaxis: enoxaparin ppx    I have performed a physical exam and reviewed and updated ROS and Plan today (4/3/2022). In review of yesterday's note (4/2/2022), there are no changes except as documented above.

## 2022-04-03 NOTE — CARE PLAN
The patient is Stable - Low risk of patient condition declining or worsening    Shift Goals  Clinical Goals: Drain management, pain management  Patient Goals: Pain control  Family Goals: No family present at this time    Progress made toward(s) clinical / shift goals:    Problem: Knowledge Deficit - Standard  Goal: Patient and family/care givers will demonstrate understanding of plan of care, disease process/condition, diagnostic tests and medications  Outcome: Progressing     Problem: Pain - Standard  Goal: Alleviation of pain or a reduction in pain to the patient’s comfort goal  Outcome: Progressing     Problem: Communication  Goal: The ability to communicate needs accurately and effectively will improve  Outcome: Progressing     Problem: Dysphagia  Goal: Dysphagia will improve  Outcome: Met     Problem: Nutrition  Goal: Patient's nutritional and fluid intake will be adequate or improve  Outcome: Progressing

## 2022-04-03 NOTE — PROGRESS NOTES
Bedside report received from previous shift RN and assumed full care of patient.  Assessments complete as per flowsheets.    Alert & Oriented x4.   Patient denies chest pain/shortness of breath.  Patient reports 8/10 pain.     Medication given per MAR.     Patient tolerating diet, currently denies nausea and vomiting.    Patient ambulating independently with steady gait.    All patient needs have been met at this time, call light within reach.  Reinforced falls program rationale with patient.   Verified bed is in low and locked position, non-skid socks in place.    Hourly rounding in place.

## 2022-04-04 LAB
ANION GAP SERPL CALC-SCNC: 11 MMOL/L (ref 7–16)
BASOPHILS # BLD AUTO: 0.8 % (ref 0–1.8)
BASOPHILS # BLD: 0.05 K/UL (ref 0–0.12)
BUN SERPL-MCNC: 5 MG/DL (ref 8–22)
CALCIUM SERPL-MCNC: 8.8 MG/DL (ref 8.5–10.5)
CHLORIDE SERPL-SCNC: 104 MMOL/L (ref 96–112)
CO2 SERPL-SCNC: 23 MMOL/L (ref 20–33)
CREAT SERPL-MCNC: 0.78 MG/DL (ref 0.5–1.4)
EOSINOPHIL # BLD AUTO: 0.19 K/UL (ref 0–0.51)
EOSINOPHIL NFR BLD: 2.9 % (ref 0–6.9)
ERYTHROCYTE [DISTWIDTH] IN BLOOD BY AUTOMATED COUNT: 44.2 FL (ref 35.9–50)
GFR SERPLBLD CREATININE-BSD FMLA CKD-EPI: 108 ML/MIN/1.73 M 2
GLUCOSE SERPL-MCNC: 91 MG/DL (ref 65–99)
HCT VFR BLD AUTO: 36.4 % (ref 42–52)
HGB BLD-MCNC: 12.4 G/DL (ref 14–18)
IMM GRANULOCYTES # BLD AUTO: 0.02 K/UL (ref 0–0.11)
IMM GRANULOCYTES NFR BLD AUTO: 0.3 % (ref 0–0.9)
LYMPHOCYTES # BLD AUTO: 1.92 K/UL (ref 1–4.8)
LYMPHOCYTES NFR BLD: 29.2 % (ref 22–41)
MAGNESIUM SERPL-MCNC: 2.1 MG/DL (ref 1.5–2.5)
MCH RBC QN AUTO: 30.8 PG (ref 27–33)
MCHC RBC AUTO-ENTMCNC: 34.1 G/DL (ref 33.7–35.3)
MCV RBC AUTO: 90.5 FL (ref 81.4–97.8)
MONOCYTES # BLD AUTO: 0.51 K/UL (ref 0–0.85)
MONOCYTES NFR BLD AUTO: 7.8 % (ref 0–13.4)
NEUTROPHILS # BLD AUTO: 3.89 K/UL (ref 1.82–7.42)
NEUTROPHILS NFR BLD: 59 % (ref 44–72)
NRBC # BLD AUTO: 0 K/UL
NRBC BLD-RTO: 0 /100 WBC
PLATELET # BLD AUTO: 218 K/UL (ref 164–446)
PMV BLD AUTO: 9 FL (ref 9–12.9)
POTASSIUM SERPL-SCNC: 3.9 MMOL/L (ref 3.6–5.5)
RBC # BLD AUTO: 4.02 M/UL (ref 4.7–6.1)
SODIUM SERPL-SCNC: 138 MMOL/L (ref 135–145)
WBC # BLD AUTO: 6.6 K/UL (ref 4.8–10.8)

## 2022-04-04 PROCEDURE — 80048 BASIC METABOLIC PNL TOTAL CA: CPT

## 2022-04-04 PROCEDURE — 85025 COMPLETE CBC W/AUTO DIFF WBC: CPT

## 2022-04-04 PROCEDURE — 770001 HCHG ROOM/CARE - MED/SURG/GYN PRIV*

## 2022-04-04 PROCEDURE — 700101 HCHG RX REV CODE 250: Performed by: FAMILY MEDICINE

## 2022-04-04 PROCEDURE — 700105 HCHG RX REV CODE 258: Performed by: INTERNAL MEDICINE

## 2022-04-04 PROCEDURE — A9270 NON-COVERED ITEM OR SERVICE: HCPCS | Performed by: FAMILY MEDICINE

## 2022-04-04 PROCEDURE — 700102 HCHG RX REV CODE 250 W/ 637 OVERRIDE(OP): Performed by: HOSPITALIST

## 2022-04-04 PROCEDURE — 700111 HCHG RX REV CODE 636 W/ 250 OVERRIDE (IP): Performed by: HOSPITALIST

## 2022-04-04 PROCEDURE — 700102 HCHG RX REV CODE 250 W/ 637 OVERRIDE(OP): Performed by: FAMILY MEDICINE

## 2022-04-04 PROCEDURE — A9270 NON-COVERED ITEM OR SERVICE: HCPCS | Performed by: HOSPITALIST

## 2022-04-04 PROCEDURE — 83735 ASSAY OF MAGNESIUM: CPT

## 2022-04-04 PROCEDURE — 700111 HCHG RX REV CODE 636 W/ 250 OVERRIDE (IP): Performed by: INTERNAL MEDICINE

## 2022-04-04 PROCEDURE — 99232 SBSQ HOSP IP/OBS MODERATE 35: CPT | Performed by: FAMILY MEDICINE

## 2022-04-04 RX ORDER — HYDROCODONE BITARTRATE AND ACETAMINOPHEN 10; 325 MG/1; MG/1
1 TABLET ORAL EVERY 4 HOURS PRN
Status: DISCONTINUED | OUTPATIENT
Start: 2022-04-04 | End: 2022-04-07 | Stop reason: HOSPADM

## 2022-04-04 RX ADMIN — ENOXAPARIN SODIUM 40 MG: 40 INJECTION SUBCUTANEOUS at 17:15

## 2022-04-04 RX ADMIN — HYDROCODONE BITARTRATE AND ACETAMINOPHEN 1 TABLET: 10; 325 TABLET ORAL at 21:06

## 2022-04-04 RX ADMIN — MORPHINE SULFATE 2 MG: 4 INJECTION INTRAVENOUS at 02:12

## 2022-04-04 RX ADMIN — SENNOSIDES AND DOCUSATE SODIUM 2 TABLET: 50; 8.6 TABLET ORAL at 05:04

## 2022-04-04 RX ADMIN — MORPHINE SULFATE 2 MG: 4 INJECTION INTRAVENOUS at 09:35

## 2022-04-04 RX ADMIN — MORPHINE SULFATE 2 MG: 4 INJECTION INTRAVENOUS at 22:37

## 2022-04-04 RX ADMIN — DIPHENHYDRAMINE HYDROCHLORIDE 25 MG: 25 TABLET ORAL at 02:12

## 2022-04-04 RX ADMIN — HYDROCODONE BITARTRATE AND ACETAMINOPHEN 1 TABLET: 10; 325 TABLET ORAL at 17:13

## 2022-04-04 RX ADMIN — PIPERACILLIN AND TAZOBACTAM 3.38 G: 3; .375 INJECTION, POWDER, LYOPHILIZED, FOR SOLUTION INTRAVENOUS; PARENTERAL at 12:45

## 2022-04-04 RX ADMIN — HYDROCODONE BITARTRATE AND ACETAMINOPHEN 1 TABLET: 5; 325 TABLET ORAL at 00:05

## 2022-04-04 RX ADMIN — SENNOSIDES AND DOCUSATE SODIUM 2 TABLET: 50; 8.6 TABLET ORAL at 17:14

## 2022-04-04 RX ADMIN — HYDROCODONE BITARTRATE AND ACETAMINOPHEN 1 TABLET: 5; 325 TABLET ORAL at 12:42

## 2022-04-04 RX ADMIN — PIPERACILLIN AND TAZOBACTAM 3.38 G: 3; .375 INJECTION, POWDER, LYOPHILIZED, FOR SOLUTION INTRAVENOUS; PARENTERAL at 21:01

## 2022-04-04 RX ADMIN — MORPHINE SULFATE 2 MG: 4 INJECTION INTRAVENOUS at 14:00

## 2022-04-04 RX ADMIN — PIPERACILLIN AND TAZOBACTAM 3.38 G: 3; .375 INJECTION, POWDER, LYOPHILIZED, FOR SOLUTION INTRAVENOUS; PARENTERAL at 05:04

## 2022-04-04 RX ADMIN — HYDROCODONE BITARTRATE AND ACETAMINOPHEN 1 TABLET: 5; 325 TABLET ORAL at 06:20

## 2022-04-04 RX ADMIN — POTASSIUM CHLORIDE AND SODIUM CHLORIDE: 900; 150 INJECTION, SOLUTION INTRAVENOUS at 06:20

## 2022-04-04 ASSESSMENT — ENCOUNTER SYMPTOMS
FLANK PAIN: 0
NECK PAIN: 0
NAUSEA: 0
BLURRED VISION: 0
COUGH: 0
DIARRHEA: 0
SPEECH CHANGE: 0
BACK PAIN: 0
FEVER: 0
HEARTBURN: 0
ABDOMINAL PAIN: 1
DIAPHORESIS: 0
WHEEZING: 0
DIZZINESS: 0
VOMITING: 0
SORE THROAT: 0
SHORTNESS OF BREATH: 0
PALPITATIONS: 0
FOCAL WEAKNESS: 0
WEAKNESS: 0
SENSORY CHANGE: 0
HEADACHES: 0
CHILLS: 0
NERVOUS/ANXIOUS: 0
MYALGIAS: 0

## 2022-04-04 ASSESSMENT — PAIN DESCRIPTION - PAIN TYPE: TYPE: ACUTE PAIN

## 2022-04-04 ASSESSMENT — PAIN SCALES - WONG BAKER: WONGBAKER_NUMERICALRESPONSE: HURTS A WHOLE LOT

## 2022-04-04 NOTE — CARE PLAN
The patient is Stable - Low risk of patient condition declining or worsening    Shift Goals  Clinical Goals: Pain control  Patient Goals: Pain control  Family Goals: No family present    Progress made toward(s) clinical / shift goals:    Problem: Knowledge Deficit - Standard  Goal: Patient and family/care givers will demonstrate understanding of plan of care, disease process/condition, diagnostic tests and medications  Outcome: Progressing  Note: Pt verbalizes understanding of POC     Problem: Pain - Standard  Goal: Alleviation of pain or a reduction in pain to the patient’s comfort goal  Outcome: Progressing  Note: Pt medicated per MAR/non pharmacological interventions in place     Problem: Discharge Barriers/Planning  Goal: Patient's continuum of care needs are met  Outcome: Progressing  Note: Pt educated on discharge barriers       Patient is not progressing towards the following goals:

## 2022-04-04 NOTE — DISCHARGE PLANNING
Anticipated Discharge Disposition: Home with Outpatient IV ABX.    Action: LSW spoke with Lynn RN at the [facility] in [place]. Per Lynn, patient's PCP is Dr. Ward. Per Lynn, [facility]     Per Lynn request, LSW faxed ID Orders and clinical documentation to [facility] [phone] and [facility] [phone].  Lynn stated the orders and clinicals will be forwarded to [facility] and schedule the appointments.    LSW faxed the ID Order dated 3/3/22 for Ertapenem.     Barriers to Discharge: Arranges for Outpatient Infusion.    Plan: Home with Outpatient Infusion, pending update from the [facility]    PLAN: As above. Awaiting call back from the [facility] and [facility].     2:30pm - LSW called [facility] [phone], message left requesting a call back to schedule patient's appointment.    2:50pm - LSW received a call from Trever,  at the [facility]. Per Trever, the ID Order for Ertapenem and clinical documentation was received this morning. Per Isis Perera, Referral Specialist forwarded the documentation to the [facility] for approval. Per Mark, [facility] is not lauri schedule the appointment until the [facility] provides the authorization. Per Mark Perera will contact [facility] as soon as insurance authorization is provided by the [facility].    PLAN: Home with Outpatient IV ABX, pending insurance authorization.    3:25pm - Per Lynn, the [facility] provided authorization to [facility] earlier today. Lynn requested LSW confirms with this patient that he is not interested in having Home Infusion before she submits the request for outpatient services to the [facility] LSW met with the patient and explained Home with [facility] and IV ABX.     The patient stated is NOT interested in home services. His preference is outpatient infusion as he is aware [facility] will not visit him every day and he is not  comfortable managing his own IV ABX at home without daily support.    LSW called [facility] and left a message for Lynn with patient's final decision.    PLAN: Home with Outpatient Infusion, pending authorization from the [facility]. CM to follow up with with the [facility] and [facility].

## 2022-04-04 NOTE — PROGRESS NOTES
Report received. Assumed care. Pt in bed  A/O x4. VSS. Responds appropriately. Patient pain 7/10, medicated per MAR/provided non pharmacological interventions.   Tolerating soft reg.diet  Denies SOB/N/V  LLQ ostomy in place- pt self care   IR to left lower buttocks, drain to bulb suction- flushed with 10mL NS  Assessment complete.   Discussed POC, pt verbalizes understanding.   Explained importance of calling before getting OOB.   Call light and belongings within reach. Bed in the lowest position.  Bed alarm off pt low fall risk per irais kan.  Treaded socks in place. SCDs off- patient ambulating  Hourly rounding in progress. Will continue to monitor .

## 2022-04-04 NOTE — CARE PLAN
The patient is Stable - Low risk of patient condition declining or worsening      Shift Goals  Clinical Goals: pain control, antibiotics  Patient Goals: pain control  Family Goals: N/A    Progress made toward(s) clinical / shift goals:  Patient medicated per MAR. Continues on antibiotics.    Problem: Knowledge Deficit - Standard  Goal: Patient and family/care givers will demonstrate understanding of plan of care, disease process/condition, diagnostic tests and medications  Outcome: Progressing     Problem: Pain - Standard  Goal: Alleviation of pain or a reduction in pain to the patient’s comfort goal  Outcome: Progressing  Flowsheets  Taken 4/4/2022 0317  OB Pain Intervention: Medication - See MAR  Taken 4/4/2022 0212  Pain Rating Scale (NPRS): 5     Problem: Communication  Goal: The ability to communicate needs accurately and effectively will improve  Outcome: Progressing     Problem: Discharge Barriers/Planning  Goal: Patient's continuum of care needs are met  Outcome: Progressing  Note: Continues to progress towards discharge.      Problem: Bowel Elimination  Goal: Establish and maintain regular bowel function  Outcome: Progressing

## 2022-04-04 NOTE — PROGRESS NOTES
Pt is A&O 4  Pain 5-7/10 and controled with PRNs per MAR.    Denies nausea  Tolerating a Regular diet   IR drain to LL buttock/back. 1.5 mL green/dark brown output this shift.   + Voids  + flatus  + BM  Up per self.  Bed alarm off, pt low fall risk per irais kan. SCDS off due to frequent patient movement.     Continues ABX.   Reviewed plan of care with patient, bed in lowest position and locked, pt resting comfortably now, call light within reach, all needs met at this time. Interventions will be executed per plan of care

## 2022-04-05 PROBLEM — E83.42 HYPOMAGNESEMIA: Status: RESOLVED | Noted: 2022-03-29 | Resolved: 2022-04-05

## 2022-04-05 PROBLEM — E87.6 HYPOKALEMIA: Status: RESOLVED | Noted: 2019-07-07 | Resolved: 2022-04-05

## 2022-04-05 LAB
ANION GAP SERPL CALC-SCNC: 10 MMOL/L (ref 7–16)
BASOPHILS # BLD AUTO: 0.7 % (ref 0–1.8)
BASOPHILS # BLD: 0.05 K/UL (ref 0–0.12)
BUN SERPL-MCNC: 8 MG/DL (ref 8–22)
CALCIUM SERPL-MCNC: 8.6 MG/DL (ref 8.5–10.5)
CHLORIDE SERPL-SCNC: 107 MMOL/L (ref 96–112)
CO2 SERPL-SCNC: 22 MMOL/L (ref 20–33)
CREAT SERPL-MCNC: 0.81 MG/DL (ref 0.5–1.4)
EOSINOPHIL # BLD AUTO: 0.26 K/UL (ref 0–0.51)
EOSINOPHIL NFR BLD: 3.8 % (ref 0–6.9)
ERYTHROCYTE [DISTWIDTH] IN BLOOD BY AUTOMATED COUNT: 43.9 FL (ref 35.9–50)
GFR SERPLBLD CREATININE-BSD FMLA CKD-EPI: 107 ML/MIN/1.73 M 2
GLUCOSE SERPL-MCNC: 86 MG/DL (ref 65–99)
HCT VFR BLD AUTO: 38 % (ref 42–52)
HGB BLD-MCNC: 13.2 G/DL (ref 14–18)
IMM GRANULOCYTES # BLD AUTO: 0.03 K/UL (ref 0–0.11)
IMM GRANULOCYTES NFR BLD AUTO: 0.4 % (ref 0–0.9)
LYMPHOCYTES # BLD AUTO: 2.05 K/UL (ref 1–4.8)
LYMPHOCYTES NFR BLD: 29.6 % (ref 22–41)
MCH RBC QN AUTO: 31.4 PG (ref 27–33)
MCHC RBC AUTO-ENTMCNC: 34.7 G/DL (ref 33.7–35.3)
MCV RBC AUTO: 90.5 FL (ref 81.4–97.8)
MONOCYTES # BLD AUTO: 0.48 K/UL (ref 0–0.85)
MONOCYTES NFR BLD AUTO: 6.9 % (ref 0–13.4)
NEUTROPHILS # BLD AUTO: 4.06 K/UL (ref 1.82–7.42)
NEUTROPHILS NFR BLD: 58.6 % (ref 44–72)
NRBC # BLD AUTO: 0 K/UL
NRBC BLD-RTO: 0 /100 WBC
PLATELET # BLD AUTO: 208 K/UL (ref 164–446)
PMV BLD AUTO: 8.8 FL (ref 9–12.9)
POTASSIUM SERPL-SCNC: 4 MMOL/L (ref 3.6–5.5)
RBC # BLD AUTO: 4.2 M/UL (ref 4.7–6.1)
SODIUM SERPL-SCNC: 139 MMOL/L (ref 135–145)
WBC # BLD AUTO: 6.9 K/UL (ref 4.8–10.8)

## 2022-04-05 PROCEDURE — 80048 BASIC METABOLIC PNL TOTAL CA: CPT

## 2022-04-05 PROCEDURE — A9270 NON-COVERED ITEM OR SERVICE: HCPCS | Performed by: FAMILY MEDICINE

## 2022-04-05 PROCEDURE — 85025 COMPLETE CBC W/AUTO DIFF WBC: CPT

## 2022-04-05 PROCEDURE — 700102 HCHG RX REV CODE 250 W/ 637 OVERRIDE(OP): Performed by: HOSPITALIST

## 2022-04-05 PROCEDURE — 700111 HCHG RX REV CODE 636 W/ 250 OVERRIDE (IP): Performed by: INTERNAL MEDICINE

## 2022-04-05 PROCEDURE — 99231 SBSQ HOSP IP/OBS SF/LOW 25: CPT | Performed by: NURSE PRACTITIONER

## 2022-04-05 PROCEDURE — A9270 NON-COVERED ITEM OR SERVICE: HCPCS | Performed by: HOSPITALIST

## 2022-04-05 PROCEDURE — 700111 HCHG RX REV CODE 636 W/ 250 OVERRIDE (IP): Performed by: HOSPITALIST

## 2022-04-05 PROCEDURE — 700105 HCHG RX REV CODE 258: Performed by: INTERNAL MEDICINE

## 2022-04-05 PROCEDURE — 770001 HCHG ROOM/CARE - MED/SURG/GYN PRIV*

## 2022-04-05 PROCEDURE — 700102 HCHG RX REV CODE 250 W/ 637 OVERRIDE(OP): Performed by: FAMILY MEDICINE

## 2022-04-05 RX ADMIN — PIPERACILLIN AND TAZOBACTAM 3.38 G: 3; .375 INJECTION, POWDER, LYOPHILIZED, FOR SOLUTION INTRAVENOUS; PARENTERAL at 22:20

## 2022-04-05 RX ADMIN — HYDROCODONE BITARTRATE AND ACETAMINOPHEN 1 TABLET: 10; 325 TABLET ORAL at 08:45

## 2022-04-05 RX ADMIN — PIPERACILLIN AND TAZOBACTAM 3.38 G: 3; .375 INJECTION, POWDER, LYOPHILIZED, FOR SOLUTION INTRAVENOUS; PARENTERAL at 13:18

## 2022-04-05 RX ADMIN — HYDROCODONE BITARTRATE AND ACETAMINOPHEN 1 TABLET: 10; 325 TABLET ORAL at 13:18

## 2022-04-05 RX ADMIN — PIPERACILLIN AND TAZOBACTAM 3.38 G: 3; .375 INJECTION, POWDER, LYOPHILIZED, FOR SOLUTION INTRAVENOUS; PARENTERAL at 04:54

## 2022-04-05 RX ADMIN — SENNOSIDES AND DOCUSATE SODIUM 2 TABLET: 50; 8.6 TABLET ORAL at 17:29

## 2022-04-05 RX ADMIN — MORPHINE SULFATE 2 MG: 4 INJECTION INTRAVENOUS at 03:32

## 2022-04-05 RX ADMIN — HYDROCODONE BITARTRATE AND ACETAMINOPHEN 1 TABLET: 10; 325 TABLET ORAL at 22:20

## 2022-04-05 RX ADMIN — HYDROCODONE BITARTRATE AND ACETAMINOPHEN 1 TABLET: 10; 325 TABLET ORAL at 17:29

## 2022-04-05 RX ADMIN — SENNOSIDES AND DOCUSATE SODIUM 2 TABLET: 50; 8.6 TABLET ORAL at 04:54

## 2022-04-05 RX ADMIN — HYDROCODONE BITARTRATE AND ACETAMINOPHEN 1 TABLET: 10; 325 TABLET ORAL at 02:43

## 2022-04-05 RX ADMIN — ENOXAPARIN SODIUM 40 MG: 40 INJECTION SUBCUTANEOUS at 13:22

## 2022-04-05 ASSESSMENT — ENCOUNTER SYMPTOMS
NERVOUS/ANXIOUS: 0
NAUSEA: 0
SPEECH CHANGE: 0
SHORTNESS OF BREATH: 0
FLANK PAIN: 0
CHILLS: 0
BLURRED VISION: 0
DIARRHEA: 0
ABDOMINAL PAIN: 1
COUGH: 0
MYALGIAS: 0
PALPITATIONS: 0
NECK PAIN: 0
WHEEZING: 0
FEVER: 0
BACK PAIN: 0
HEARTBURN: 0
SORE THROAT: 0
HEADACHES: 0
WEAKNESS: 0
DIAPHORESIS: 0
DIZZINESS: 0
FOCAL WEAKNESS: 0
SENSORY CHANGE: 0
VOMITING: 0

## 2022-04-05 NOTE — CARE PLAN
The patient is watcher- due to changes in drain status-suction.    Shift Goals  Clinical Goals: pain control, determine drain trouble  Patient Goals: pain control  Family Goals: No family present    Progress made toward(s) clinical / shift goals:  pain control    Patient is not progressing towards the following goals: Determine drain troubles- not met due to inability to obtain new drain supplies. Charge N and hospitalist aware.         Problem: Pain - Standard  Goal: Alleviation of pain or a reduction in pain to the patient’s comfort goal  Outcome: Progressing  Flowsheets  Taken 4/5/2022 0110  OB Pain Intervention: Medication - See MAR  Taken 4/4/2022 2237  Pain Rating Scale (NPRS): 6  Note: Pain managed with PRNs per MAR.      Problem: Discharge Barriers/Planning  Goal: Patient's continuum of care needs are met  Outcome: Progressing  Flowsheets (Taken 4/4/2022 0935 by Breanne Clayton, R.N.)  Continuum of Care Needs:   Assessed for discharge barriers   Communicated discharge barriers to interdisciplinary tream  Note: Continues to wait for discharge Ok per [facility].      Problem: Gastrointestinal Irritability  Goal: Nausea and vomiting will be absent or improve  Outcome: Progressing

## 2022-04-05 NOTE — DISCHARGE PLANNING
Anticipated Discharge Disposition:   Home with OP Infusion Clinic Follow Up    Action:   This RN CM spoke with Sheree at the [facility]  OP Infusion and as of now Pt is still pending Insurance auth thru the [facility].    Will rech out to Cecile of the [facility] who is obtaining Insurance auth.    Per CM notes Pixley to call CM once Insurance auth is obtained for OP Infusion for daily Ertapenem on discharge.    This RN CM received a call from Lynn of the [facility] stating that Pt cannot be accepted on continuous dosing of Zosyn . Explained to Lynn that  the latest order is Ertapenem.    This RN CM requested Sheree of of [facility] OP Infusion to fax the orders to this RN CM . It was faxed to her last week. Per Sheree she will fax it to the CM office .     The Ertapenem order must be faxed to [facility] at F [phone].    Lynn's contact number is Tel [phone] ext [phone].    12:53 This RN CM faxed the Ertapenem order to [facility]  at the above number. Confirmation received. Copy was also emailed to Lynn.     Received a call from Lynn that Dr Stone at [facility] is still gong to review the case with their Pharmacist. Will follow up    Barriers to Discharge:   Pending Pt's Insurance Auth thru [facility] for OP Infusion at [facility].    Pt has no Medicare Part D    Plan:   CM to continue to assist with discharge as needed

## 2022-04-05 NOTE — PROGRESS NOTES
Hospital Medicine Daily Progress Note    Date of Service  4/5/2022    Chief Complaint  Beau Mcintyre is a 51 y.o. male admitted 3/21/2022 with abdominal fluid collection.    Hospital Course  Admitted as a transfer for small bowel obstruction and abdominal fluid collection.  The small bowel obstruction spontaneously resolved.  For the abdominal fluid collection, surgery was consulted on the case.  Patient underwent CT-guided retroperitoneal pelvic/presacral catheter drainage on 3/22/2022 by interventional radiology.  He was started on empiric coverage with IV Zosyn.  Fluid culture showed E. coli which was pansensitive.  Infectious disease was consulted on the case.  Further work-up showed the presence of a fistula on abscessogram.  On discussion with infectious disease, and surgery, interventional radiology, the drain will need to remain.  He will require a prolonged IV antibiotic course.    Interval Problem Update  4/5: No acute events overnight. VSS on room air. Afebrile. No leukocytosis. Renal function at baseline. Medically cleared for discharge pending IV infusion set-up    I have personally seen and examined the patient at bedside. I discussed the plan of care with patient, bedside RN, charge RN and .    Consultants/Specialty  general surgery, infectious disease and Interventional radiology    Code Status  Full Code    Disposition  Patient is medically cleared for discharge.   Anticipate discharge to to home with organized home healthcare and close outpatient follow-up.  I have placed the appropriate orders for post-discharge needs.    Review of Systems  Review of Systems   Constitutional: Negative for chills, diaphoresis, fever and malaise/fatigue.   HENT: Negative for congestion, hearing loss and sore throat.    Eyes: Negative for blurred vision.   Respiratory: Negative for cough, shortness of breath and wheezing.    Cardiovascular: Negative for chest pain, palpitations and leg swelling.    Gastrointestinal: Positive for abdominal pain. Negative for diarrhea, heartburn, nausea and vomiting.   Genitourinary: Negative for dysuria, flank pain and hematuria.   Musculoskeletal: Negative for back pain, joint pain, myalgias and neck pain.   Skin: Negative for rash.   Neurological: Negative for dizziness, sensory change, speech change, focal weakness, weakness and headaches.   Psychiatric/Behavioral: The patient is not nervous/anxious.         Physical Exam  Temp:  [36 °C (96.8 °F)-36.9 °C (98.4 °F)] 36 °C (96.8 °F)  Pulse:  [76-85] 76  Resp:  [14-18] 16  BP: (100-124)/(68-74) 100/68  SpO2:  [95 %-99 %] 97 %    Physical Exam  Vitals and nursing note reviewed.   HENT:      Head: Normocephalic and atraumatic.      Nose: No congestion.      Mouth/Throat:      Mouth: Mucous membranes are moist.   Eyes:      Extraocular Movements: Extraocular movements intact.      Conjunctiva/sclera: Conjunctivae normal.   Cardiovascular:      Rate and Rhythm: Normal rate and regular rhythm.   Pulmonary:      Effort: Pulmonary effort is normal.      Breath sounds: Normal breath sounds.   Abdominal:      General: There is no distension.      Palpations: Abdomen is soft.      Tenderness: There is no abdominal tenderness. There is no guarding or rebound.      Comments: RLQ ostomy  Left gluteal drain   Musculoskeletal:      Cervical back: No tenderness.      Right lower leg: No edema.      Left lower leg: No edema.   Skin:     General: Skin is warm and dry.   Neurological:      General: No focal deficit present.      Mental Status: He is alert and oriented to person, place, and time.      Cranial Nerves: No cranial nerve deficit.         Fluids    Intake/Output Summary (Last 24 hours) at 4/5/2022 1158  Last data filed at 4/5/2022 0200  Gross per 24 hour   Intake 355 ml   Output 452 ml   Net -97 ml       Laboratory  Recent Labs     04/03/22  0104 04/04/22  0218 04/05/22  0254   WBC 7.0 6.6 6.9   RBC 3.88* 4.02* 4.20*   HEMOGLOBIN  12.4* 12.4* 13.2*   HEMATOCRIT 36.3* 36.4* 38.0*   MCV 93.6 90.5 90.5   MCH 32.0 30.8 31.4   MCHC 34.2 34.1 34.7   RDW 46.0 44.2 43.9   PLATELETCT 231 218 208   MPV 8.9* 9.0 8.8*     Recent Labs     04/03/22  0104 04/04/22  0218 04/05/22  0254   SODIUM 138 138 139   POTASSIUM 3.6 3.9 4.0   CHLORIDE 105 104 107   CO2 23 23 22   GLUCOSE 100* 91 86   BUN 6* 5* 8   CREATININE 0.75 0.78 0.81   CALCIUM 8.3* 8.8 8.6                   Imaging  [id number] VIEWS   Final Result      1.  Apparent mildly dilated loop of small bowel in the pelvis could represent partial or early obstruction versus ileus.      2.  No free air identified.      CT-ABDOMEN-PELVIS WITH   Final Result      1.  Interval increase in size of presacral fluid collection since prior exam dated 3/20/2022, with drainage catheter in place.   2.  Dilated bowel loops in the central pelvis suggesting at least partial obstruction.   3.  Postoperative change as described.         IR-PICC LINE PLACEMENT W/ GUIDANCE > AGE 5   Final Result                  Ultrasound-guided PICC placement performed by qualified nursing staff as    above.          [id number]   Final Result      1.  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      [id number] VIEW   Final Result      1.  Pigtail drainage catheter in place within the pelvis.   2.  Nonobstructive bowel gas pattern.      CT-PELVIS WITH   Final Result      1.  Right lower quadrant ileostomy.   2.  Left transgluteal posterior pelvic/presacral pigtail drainage catheter remains in place. No residual fluid collection evident about the pigtail loop.   3.  Enteric contrast opacifying pelvic small bowel loops. No appreciable communication of contrast into the presacral collection.   4.  Catheter abscessogram may be of interest if there is strong clinical suspicion for a fistula.      CT-ABDOMEN-PELVIS WITH   Final Result         1. No contrast is seen within the presacral fluid collection to suggest fistulous  connection.      CT-ABDOMEN&PELVIS ENTEROGRAPHY   Final Result      1.  Unchanged presacral/precoccygeal space fluid collection measuring 2.9 x 2.2 cm consistent with abscess      2.  Prior colectomy and there is a right lower quadrant ostomy      3.  No direct evidence for fistula although cannot be excluded with the negative contrast administered for enterography. Consider repeat CT scan with standard oral contrast when clinically appropriate      4.  Postoperative changes in the presacral/precoccygeal space      5.  Mass contiguous with the inferior aspect of rectal stump which could be tumor versus enlarged prostate.      6.  No other significant finding      CT-DRAIN-PERITONEAL   Final Result      1.  CT GUIDED RETROPERITONEAL PELVIC/PRESACRAL CATHETER DRAINAGE. INITIAL FLUID RETURN RESEMBLED ENTERIC CONTENTS. A FISTULOUS LEAK MAY BE PRESENT. LIKEWISE, THE AIR CONTENT SUGGESTS THE POSSIBILITY OF COMMUNICATION WITH BOWEL.   2.  THE CURRENT PLAN IS TO MONITOR DRAINAGE OUTPUT, IRRIGATE DAILY AND OBTAIN A FOLLOWUP CT SCAN IN 5-7 DAYS IF CLINICALLY INDICATED.   3. ABSCESSOGRAM MAY BE OF INTEREST PRIOR TO CATHETER REMOVAL TO DETERMINE IF THERE IS FISTULOUS COMMUNICATION WITH SMALL BOWEL IN THE PELVIS.      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 In review of yesterday's note (4/4/2022), there are no changes except as documented above.

## 2022-04-05 NOTE — CARE PLAN
The patient is Stable - Low risk of patient condition declining or worsening    Shift Goals  Clinical Goals: pain control, determine drain trouble  Patient Goals: pain control  Family Goals: No family present    Progress made toward(s) clinical / shift goals:    Problem: Knowledge Deficit - Standard  Goal: Patient and family/care givers will demonstrate understanding of plan of care, disease process/condition, diagnostic tests and medications  Outcome: Progressing  Note: Pt verbalizes understanding of POC     Problem: Pain - Standard  Goal: Alleviation of pain or a reduction in pain to the patient’s comfort goal  Outcome: Progressing  Note: Medicated per MAR/ provided non pharmacological interventions        Patient is not progressing towards the following goals:

## 2022-04-05 NOTE — PROGRESS NOTES
NETTA bulb was filling with air quickly despite drain tubing and valve to be working. Central supply did not carry the replacement pieces needed, unable to obtain from IR since after hours. Pt did c/o increased discomfort when flushing. Notified hospitalistTammLYNDSEY Castellon. No new orders placed.

## 2022-04-05 NOTE — PROGRESS NOTES
Report received.  Assumed care.   Pt in bed. A/O x4. VSS.   Responds appropriately.  Pain level 7/10 Medicated per MAR/provided non pharmacological interventions.  Denies SOB/N/V  Tolerating soft reg diet  IR drain to left buttock- drained with NS per order  Self care to LLQ ostomy, site assessed c/d/i  Assessment complete. Discussed POC, pt verbalizes understanding. Explained importance of calling before getting OOB. Call light and belongings within reach  Bed alarm off, pt low fall risk per [facility]  Bed in the lowest position.   Treaded socks in place. Hourly rounding in progress. Will continue to monitor.

## 2022-04-05 NOTE — DISCHARGE PLANNING
Agency/Facility Name: [facility]  Spoke To: Cleopatra  Outcome: Per Cleopatra, she'll make a note and call DPA back.  [place] number: [phone]

## 2022-04-05 NOTE — DISCHARGE INSTRUCTIONS
Discharge Instructions    Flush drain once per day, dressing change very 5-7 days or PRN.  Provided patient with enough dressing changes for 3 weeks   Provided patient with enough Sterile saline supplies to flush once per day for 3 weeks   Monitor on signs and symptoms of infection  Patient to keep log of drainage output   Follow up with [facility] office for drain management    Discharged to home by car with relative. Discharged via wheelchair, hospital escort: Yes.  Special equipment needed: Not Applicable    Be sure to schedule a follow-up appointment with your primary care doctor or any specialists as instructed.     Discharge Plan:   Influenza Vaccine Indication: Patient Refuses    I understand that a diet low in cholesterol, fat, and sodium is recommended for good health. Unless I have been given specific instructions below for another diet, I accept this instruction as my diet prescription.   Other diet: Regular    Special Instructions: None    · Is patient discharged on Warfarin / Coumadin?   No       PICC Home Care Guide    A peripherally inserted central catheter (PICC) is a form of IV access that allows medicines and IV fluids to be quickly distributed throughout the body. The PICC is a long, thin, flexible tube (catheter) that is inserted into a vein in the upper arm. The catheter ends in a large vein in the chest (superior vena cava, or SVC). After the PICC is inserted, a chest X-ray may be done to make sure that it is in the correct place.  A PICC may be placed for different reasons, such as:  · To give medicines and liquid nutrition.  · To give IV fluids and blood products.  · If there is trouble placing a peripheral intravenous (PIV) catheter.  If taken care of properly, a PICC can remain in place for several months. Having a PICC can also allow a person to go home from the hospital sooner. Medicine and PICC care can be managed at home by a family member, caregiver, or home health care team.  What are  the risks?  Generally, having a PICC is safe. However, problems may occur, including:  · A blood clot (thrombus) forming in or at the tip of the PICC.  · A blood clot forming in a vein (deep vein thrombosis) or traveling to the lung (pulmonary embolism).  · Inflammation of the vein (phlebitis) in which the PICC is placed.  · Infection. Central line associated blood stream infection (CLABSI) is a serious infection that often requires hospitalization.  · PICC movement (malposition). The PICC tip may move from its original position due to excessive physical activity, forceful coughing, sneezing, or vomiting.  · A break or cut in the PICC. It is important not to use scissors near the PICC.  · Nerve or tendon irritation or injury during PICC insertion.  How to take care of your PICC  Preventing problems  · You and any caregivers should wash your hands often with soap. Wash hands:  ? Before touching the PICC line or the infusion device.  ? Before changing a bandage (dressing).  · Flush the PICC as told by your health care provider. Let your health care provider know right away if the PICC is hard to flush or does not flush. Do not use force to flush the PICC.  · Do not use a syringe that is less than 10 mL to flush the PICC.  · Avoid blood pressure checks on the arm in which the PICC is placed.  · Never pull or tug on the PICC.  · Do not take the PICC out yourself. Only a trained clinical professional should remove the PICC.  · Use clean and sterile supplies only. Keep the supplies in a dry place. Do not reuse needles, syringes, or any other supplies. Doing that can lead to infection.  · Keep pets and children away from your PICC line.  · Check the PICC insertion site every day for signs of infection. Check for:  ? Leakage.  ? Redness, swelling, or pain.  ? Fluid or blood.  ? Warmth.  ? Pus or a bad smell.  PICC dressing care  · Keep your PICC bandage (dressing) clean and dry to prevent infection.  · Do not take baths,  swim, or use a hot tub until your health care provider approves. Ask your health care provider if you can take showers. You may only be allowed to take sponge baths for bathing. When you are allowed to shower:  ? Ask your health care provider to teach you how to wrap the PICC line.  ? Cover the PICC line with clear plastic wrap and tape to keep it dry while showering.  · Follow instructions from your health care provider about how to take care of your insertion site and dressing. Make sure you:  ? Wash your hands with soap and water before you change your bandage (dressing). If soap and water are not available, use hand .  ? Change your dressing as told by your health care provider.  ? Leave stitches (sutures), skin glue, or adhesive strips in place. These skin closures may need to stay in place for 2 weeks or longer. If adhesive strip edges start to loosen and curl up, you may trim the loose edges. Do not remove adhesive strips completely unless your health care provider tells you to do that.  · Change your PICC dressing if it becomes loose or wet.  General instructions    · Carry your PICC identification card or wear a medical alert bracelet at all times.  · Keep the tube clamped at all times, unless it is being used.  · Carry a smooth-edge clamp with you at all times to place on the tube if it breaks.  · Do not use scissors or sharp objects near the tube.  · You may bend your arm and move it freely. If your PICC is near or at the bend of your elbow, avoid activity with repeated motion at the elbow.  · Avoid lifting heavy objects as told by your health care provider.  · Keep all follow-up visits as told by your health care provider. This is important.  Disposal of supplies  · Throw away any syringes in a disposal container that is meant for sharp items (sharps container). You can buy a sharps container from a pharmacy, or you can make one by using an empty hard plastic bottle with a cover.  · Place any  "used dressings or infusion bags into a plastic bag. Throw that bag in the trash.  Contact a health care provider if:  · You have pain in your arm, ear, face, or teeth.  · You have a fever or chills.  · You have redness, swelling, or pain around the insertion site.  · You have fluid or blood coming from the insertion site.  · Your insertion site feels warm to the touch.  · You have pus or a bad smell coming from the insertion site.  · Your skin feels hard and raised around the insertion site.  Get help right away if:  · Your PICC is accidentally pulled all the way out. If this happens, cover the insertion site with a bandage or gauze dressing. Do not throw the PICC away. Your health care provider will need to check it.  · Your PICC was tugged or pulled and has partially come out. Do not  push the PICC back in.  · You cannot flush the PICC, it is hard to flush, or the PICC leaks around the insertion site when it is flushed.  · You hear a \"flushing\" sound when the PICC is flushed.  · You feel your heart racing or skipping beats.  · There is a hole or tear in the PICC.  · You have swelling in the arm in which the PICC was inserted.  · You have a red streak going up your arm from where the PICC was inserted.  Summary  · A peripherally inserted central catheter (PICC) is a long, thin, flexible tube (catheter) that is inserted into a vein in the upper arm.  · The PICC is inserted using a sterile technique by a specially trained nurse or physician. Only a trained clinical professional should remove it.  · Keep your PICC identification card with you at all times.  · Avoid blood pressure checks on the arm in which the PICC is placed.  · If cared for properly, a PICC can remain in place for several months. Having a PICC can also allow a person to go home from the hospital sooner.  This information is not intended to replace advice given to you by your health care provider. Make sure you discuss any questions you have with your " health care provider.  Document Released: 06/23/2004 Document Revised: 11/30/2018 Document Reviewed: 01/20/2018  [employer] Patient Education © 2020 [employer]      Surgical Drain Home Care  Surgical drains are used to remove extra fluid that normally builds up in a surgical wound after surgery. A surgical drain helps to heal a surgical wound. Different kinds of surgical drains include:  · Active drains. These drains use suction to pull drainage away from the surgical wound. Drainage flows through a tube to a container outside of the body. With these drains, you need to keep the bulb or the drainage container flat (compressed) at all times, except while you empty it. Flattening the bulb or container creates suction.  · Passive drains. These drains allow fluid to drain naturally, by gravity. Drainage flows through a tube to a bandage (dressing) or a container outside of the body. Passive drains do not need to be emptied.  A drain is placed during surgery. Right after surgery, drainage is usually bright red and a little thicker than water. The drainage may gradually turn yellow or pink and become thinner. It is likely that your health care provider will remove the drain when the drainage stops or when the amount decreases to 1-2 Tbsp (15-30 mL) during a 24-hour period.  Supplies needed:  · Tape.  · Germ-free cleaning solution (sterile saline).  · Cotton swabs.  · Split gauze drain sponge: 4 x 4 inches (10 x 10 cm).  · Gauze square: 4 x 4 inches (10 x 10 cm).  How to care for your surgical drain  Care for your drain as told by your health care provider. This is important to help prevent infection. If your drain is placed at your back, or any other hard-to-reach area, ask another person to assist you in performing the following tasks:  General care  · Keep the skin around the drain dry and covered with a dressing at all times.  · Check your drain area every day for signs of infection. Check for:  ? Redness, swelling,  or pain.  ? Pus or a bad smell.  ? Cloudy drainage.  ? Tenderness or pressure at the drain exit site.  Changing the dressing  Follow instructions from your health care provider about how to change your dressing. Change your dressing at least once a day. Change it more often if needed to keep the dressing dry. Make sure you:  1. Gather your supplies.  2. Wash your hands with soap and water before you change your dressing. If soap and water are not available, use hand .  3. Remove the old dressing. Avoid using scissors to do that.  4. Wash your hands with soap and water again after removing the old dressing.  5. Use sterile saline to clean your skin around the drain. You may need to use a cotton swab to clean the skin.  6. Place the tube through the slit in a drain sponge. Place the drain sponge so that it covers your wound.  7. Place the gauze square or another drain sponge on top of the drain sponge that is on the wound. Make sure the tube is between those layers.  8. Tape the dressing to your skin.  9. Tape the drainage tube to your skin 1-2 inches (2.5-5 cm) below the place where the tube enters your body. Taping keeps the tube from pulling on any stitches (sutures) that you have.  10. Wash your hands with soap and water.  11. Write down the color of your drainage and how often you change your dressing.  How to empty your active drain    1. Make sure that you have a measuring cup that you can empty your drainage into.  2. Wash your hands with soap and water. If soap and water are not available, use hand .  3. Loosen any pins or clips that hold the tube in place.  4. If your health care provider tells you to strip the tube to prevent clots and tube blockages:  ? Hold the tube at the skin with one hand. Use your other hand to pinch the tubing with your thumb and first finger.  ? Gently move your fingers down the tube while squeezing very lightly. This clears any drainage, clots, or tissue from the  tube.  ? You may need to do this several times each day to keep the tube clear. Do not pull on the tube.  5. Open the bulb cap or the drain plug. Do not touch the inside of the cap or the bottom of the plug.  6. Turn the device upside down and gently squeeze.  7. Empty all of the drainage into the measuring cup.  8. Compress the bulb or the container and replace the cap or the plug. To compress the bulb or the container, squeeze it firmly in the middle while you close the cap or plug the container.  9. Write down the amount of drainage that you have in each 24-hour period. If you have less than 2 Tbsp (30 mL) of drainage during 24 hours, contact your health care provider.  10. Flush the drainage down the toilet.  11. Wash your hands with soap and water.  Contact a health care provider if:  · You have redness, swelling, or pain around your drain area.  · You have pus or a bad smell coming from your drain area.  · You have a fever or chills.  · The skin around your drain is warm to the touch.  · The amount of drainage that you have is increasing instead of decreasing.  · You have drainage that is cloudy.  · There is a sudden stop or a sudden decrease in the amount of drainage that you have.  · Your drain tube falls out.  · Your active drain does not stay compressed after you empty it.  Summary  · Surgical drains are used to remove extra fluid that normally builds up in a surgical wound after surgery.  · Different kinds of surgical drains include active drains and passive drains. Active drains use suction to pull drainage away from the surgical wound, and passive drains allow fluid to drain naturally.  · It is important to care for your drain to prevent infection. If your drain is placed at your back, or any other hard-to-reach area, ask another person to assist you.  · Contact your health care provider if you have redness, swelling, or pain around your drain area.  This information is not intended to replace advice  given to you by your health care provider. Make sure you discuss any questions you have with your health care provider.  Document Released: 12/15/2001 Document Revised: 01/22/2020 Document Reviewed: 01/22/2020  [employer] Patient Education © 2020 [employer]      Depression / Suicide Risk    As you are discharged from this [facility] facility, it is important to learn how to keep safe from harming yourself.    Recognize the warning signs:  · Abrupt changes in personality, positive or negative- including increase in energy   · Giving away possessions  · Change in eating patterns- significant weight changes-  positive or negative  · Change in sleeping patterns- unable to sleep or sleeping all the time   · Unwillingness or inability to communicate  · Depression  · Unusual sadness, discouragement and loneliness  · Talk of wanting to die  · Neglect of personal appearance   · Rebelliousness- reckless behavior  · Withdrawal from people/activities they love  · Confusion- inability to concentrate     If you or a loved one observes any of these behaviors or has concerns about self-harm, here's what you can do:  · Talk about it- your feelings and reasons for harming yourself  · Remove any means that you might use to hurt yourself (examples: pills, rope, extension cords, firearm)  · Get professional help from the community (Mental Health, Substance Abuse, psychological counseling)  · Do not be alone:Call your Safe Contact- someone whom you trust who will be there for you.  · Call your local CRISIS HOTLINE 669-6160 or 144-520-0464  · Call your local Children's Mobile Crisis Response Team [place] (640) 708-8767 or www.[employer]  · Call the toll free National Suicide Prevention Hotlines   · National Suicide Prevention Lifeline 111-603-IMHC (4903)  · National Hope Line Network 800-SUICIDE (898-7567)

## 2022-04-05 NOTE — PROGRESS NOTES
Pt is A&O 4  Pain controlled with PRNs per MAR   Denies nausea  Tolerating diet     IR Drain to left buttock. Having troubles throughout shift with retaining air and not being able to hold suction. Patient reports troubles with suction starting around 1730 yesterday. Flushed and suction attempted to be reapplied on multiple occasions. Charge RN aware, hospitalist aware. Attempted to get new supplies- unable.     + Voids  + flatus  + BM  Up ad raad.   Bed alarm off, pt low fall risk per irais kan  Reviewed plan of care with patient, bed in lowest position and locked, pt resting comfortably now, call light within reach, all needs met at this time. Interventions will be executed per plan of care

## 2022-04-06 PROCEDURE — 700105 HCHG RX REV CODE 258: Performed by: INTERNAL MEDICINE

## 2022-04-06 PROCEDURE — 700111 HCHG RX REV CODE 636 W/ 250 OVERRIDE (IP): Performed by: INTERNAL MEDICINE

## 2022-04-06 PROCEDURE — A9270 NON-COVERED ITEM OR SERVICE: HCPCS | Performed by: FAMILY MEDICINE

## 2022-04-06 PROCEDURE — A9270 NON-COVERED ITEM OR SERVICE: HCPCS | Performed by: HOSPITALIST

## 2022-04-06 PROCEDURE — 700102 HCHG RX REV CODE 250 W/ 637 OVERRIDE(OP): Performed by: FAMILY MEDICINE

## 2022-04-06 PROCEDURE — 700111 HCHG RX REV CODE 636 W/ 250 OVERRIDE (IP): Performed by: HOSPITALIST

## 2022-04-06 PROCEDURE — 99231 SBSQ HOSP IP/OBS SF/LOW 25: CPT | Performed by: NURSE PRACTITIONER

## 2022-04-06 PROCEDURE — 770001 HCHG ROOM/CARE - MED/SURG/GYN PRIV*

## 2022-04-06 PROCEDURE — 700102 HCHG RX REV CODE 250 W/ 637 OVERRIDE(OP): Performed by: HOSPITALIST

## 2022-04-06 RX ADMIN — HYDROCODONE BITARTRATE AND ACETAMINOPHEN 1 TABLET: 10; 325 TABLET ORAL at 21:38

## 2022-04-06 RX ADMIN — MORPHINE SULFATE 2 MG: 4 INJECTION INTRAVENOUS at 04:44

## 2022-04-06 RX ADMIN — HYDROCODONE BITARTRATE AND ACETAMINOPHEN 1 TABLET: 10; 325 TABLET ORAL at 12:07

## 2022-04-06 RX ADMIN — HYDROCODONE BITARTRATE AND ACETAMINOPHEN 1 TABLET: 10; 325 TABLET ORAL at 08:06

## 2022-04-06 RX ADMIN — SENNOSIDES AND DOCUSATE SODIUM 2 TABLET: 50; 8.6 TABLET ORAL at 04:35

## 2022-04-06 RX ADMIN — PIPERACILLIN AND TAZOBACTAM 3.38 G: 3; .375 INJECTION, POWDER, LYOPHILIZED, FOR SOLUTION INTRAVENOUS; PARENTERAL at 12:04

## 2022-04-06 RX ADMIN — ENOXAPARIN SODIUM 40 MG: 40 INJECTION SUBCUTANEOUS at 14:38

## 2022-04-06 RX ADMIN — PIPERACILLIN AND TAZOBACTAM 3.38 G: 3; .375 INJECTION, POWDER, LYOPHILIZED, FOR SOLUTION INTRAVENOUS; PARENTERAL at 04:35

## 2022-04-06 RX ADMIN — PIPERACILLIN AND TAZOBACTAM 3.38 G: 3; .375 INJECTION, POWDER, LYOPHILIZED, FOR SOLUTION INTRAVENOUS; PARENTERAL at 20:45

## 2022-04-06 RX ADMIN — MORPHINE SULFATE 2 MG: 4 INJECTION INTRAVENOUS at 14:45

## 2022-04-06 RX ADMIN — HYDROCODONE BITARTRATE AND ACETAMINOPHEN 1 TABLET: 10; 325 TABLET ORAL at 02:39

## 2022-04-06 RX ADMIN — HYDROCODONE BITARTRATE AND ACETAMINOPHEN 1 TABLET: 10; 325 TABLET ORAL at 17:35

## 2022-04-06 RX ADMIN — SENNOSIDES AND DOCUSATE SODIUM 2 TABLET: 50; 8.6 TABLET ORAL at 17:35

## 2022-04-06 ASSESSMENT — ENCOUNTER SYMPTOMS
HEADACHES: 0
HEARTBURN: 0
ABDOMINAL PAIN: 0
FOCAL WEAKNESS: 0
NECK PAIN: 0
CHILLS: 0
SENSORY CHANGE: 0
BLURRED VISION: 0
DIAPHORESIS: 0
NERVOUS/ANXIOUS: 0
DIARRHEA: 0
VOMITING: 0
MYALGIAS: 0
FLANK PAIN: 0
WEAKNESS: 0
PALPITATIONS: 0
SPEECH CHANGE: 0
FEVER: 0
NAUSEA: 0
COUGH: 0
WHEEZING: 0
SORE THROAT: 0
BACK PAIN: 0
DIZZINESS: 0
SHORTNESS OF BREATH: 0

## 2022-04-06 ASSESSMENT — PAIN DESCRIPTION - PAIN TYPE
TYPE: ACUTE PAIN
TYPE: ACUTE PAIN

## 2022-04-06 NOTE — CARE PLAN
"The patient is Stable - Low risk of patient condition declining or worsening    Shift Goals  Clinical Goals: pain mangement  Patient Goals: pain management, discharging tomorrow  Family Goals: No family present    Progress made toward(s) clinical / shift goals:  Pain management    Patient is not progressing towards the following goals: discharging difficulty due to waiting for [facility] clearance.       Problem: Pain - Standard  Goal: Alleviation of pain or a reduction in pain to the patient’s comfort goal  Outcome: Progressing  Flowsheets (Taken 4/5/2022 2320)  Pain Rating Scale (NPRS): 5  Note: PRNS administered per request/need.      Problem: Discharge Barriers/Planning  Goal: Patient's continuum of care needs are met  Outcome: Progressing  Note: Discharge barrier of waiting for [facility] for out patient services.      Problem: Bowel Elimination  Goal: Establish and maintain regular bowel function  Outcome: Progressing  Note: Continues with appropriate output per ostomy- reported as \"good output\" per patient.            "

## 2022-04-06 NOTE — DISCHARGE PLANNING
Care Transition Team Discharge Planning    Anticipates discharge disposition:  · Home with OP Infusion Clinic Follow Up     Action:    Pt was discussed  in ICT rounds today. Informed Balbina SOLORIO that Pt is awaiting Insurance Auth at the [facility] thru [facility].    Per Lynn at [facility]  yesterday 4/5 Dr Stone is reviewing the case with the Pharmacist.    • This RN CM left a VM to Admissions at The [facility] at [facility] and requested a return call.  •  This RN CM is following up if Pt;s Insurrance auth for daily Ertapenem IV is complete and to obtain appointment.  • Pt refused Home Health  • Per Pt , on discharge he will schedule his own Uber transport to home and he can afford i    Barriers to Discharge:  • Pending acceptance/Insurance Auth at the [facility]  • Pending appointment    Plan:  • CM to continue to assist Pt with discharge as needed

## 2022-04-06 NOTE — PROGRESS NOTES
Hospital Medicine Daily Progress Note    Date of Service  4/6/2022    Chief Complaint  Beau Mcintyre is a 51 y.o. male admitted 3/21/2022 with abdominal fluid collection.    Hospital Course  Admitted as a transfer for small bowel obstruction and abdominal fluid collection.  The small bowel obstruction spontaneously resolved.  For the abdominal fluid collection, surgery was consulted on the case.  Patient underwent CT-guided retroperitoneal pelvic/presacral catheter drainage on 3/22/2022 by interventional radiology.  He was started on empiric coverage with IV Zosyn.  Fluid culture showed E. coli which was pansensitive.  Infectious disease was consulted on the case.  Further work-up showed the presence of a fistula on abscessogram.  On discussion with infectious disease, and surgery, interventional radiology, the drain will need to remain.  He will require a prolonged IV antibiotic course.    Interval Problem Update  4/5: No acute events overnight. VSS on room air. Afebrile. No leukocytosis. Renal function at baseline. Medically cleared for discharge pending IV infusion set-up    4/6: No acute events. He denies any complaints. Eager to be discharged once outpatient abx can be arranged     I have personally seen and examined the patient at bedside. I discussed the plan of care with patient, bedside RN, charge RN and .    Consultants/Specialty  general surgery, infectious disease and Interventional radiology    Code Status  Full Code    Disposition  Patient is medically cleared for discharge.   Anticipate discharge to to home with organized home healthcare and close outpatient follow-up.  I have placed the appropriate orders for post-discharge needs.    Review of Systems  Review of Systems   Constitutional: Negative for chills, diaphoresis, fever and malaise/fatigue.   HENT: Negative for congestion, hearing loss and sore throat.    Eyes: Negative for blurred vision.   Respiratory: Negative for cough,  shortness of breath and wheezing.    Cardiovascular: Negative for chest pain, palpitations and leg swelling.   Gastrointestinal: Negative for abdominal pain, diarrhea, heartburn, nausea and vomiting.   Genitourinary: Negative for dysuria, flank pain and hematuria.   Musculoskeletal: Negative for back pain, joint pain, myalgias and neck pain.   Skin: Negative for rash.   Neurological: Negative for dizziness, sensory change, speech change, focal weakness, weakness and headaches.   Psychiatric/Behavioral: The patient is not nervous/anxious.         Physical Exam  Temp:  [36 °C (96.8 °F)-37.1 °C (98.7 °F)] 37.1 °C (98.7 °F)  Pulse:  [62-76] 64  Resp:  [16-18] 18  BP: ()/(62-77) 94/62  SpO2:  [94 %-97 %] 95 %    Physical Exam  Vitals and nursing note reviewed.   HENT:      Head: Normocephalic and atraumatic.      Nose: No congestion.      Mouth/Throat:      Mouth: Mucous membranes are moist.   Eyes:      Extraocular Movements: Extraocular movements intact.      Conjunctiva/sclera: Conjunctivae normal.   Cardiovascular:      Rate and Rhythm: Normal rate and regular rhythm.   Pulmonary:      Effort: Pulmonary effort is normal.      Breath sounds: Normal breath sounds.   Abdominal:      General: There is no distension.      Palpations: Abdomen is soft.      Tenderness: There is no abdominal tenderness. There is no guarding or rebound.      Comments: RLQ ostomy  Left gluteal drain   Musculoskeletal:      Cervical back: No tenderness.      Right lower leg: No edema.      Left lower leg: No edema.   Skin:     General: Skin is warm and dry.   Neurological:      General: No focal deficit present.      Mental Status: He is alert and oriented to person, place, and time.      Cranial Nerves: No cranial nerve deficit.         Fluids    Intake/Output Summary (Last 24 hours) at 4/6/2022 0839  Last data filed at 4/6/2022 0400  Gross per 24 hour   Intake 600 ml   Output 18 ml   Net 582 ml       Laboratory  Recent Labs      04/04/22  0218 04/05/22  0254   WBC 6.6 6.9   RBC 4.02* 4.20*   HEMOGLOBIN 12.4* 13.2*   HEMATOCRIT 36.4* 38.0*   MCV 90.5 90.5   MCH 30.8 31.4   MCHC 34.1 34.7   RDW 44.2 43.9   PLATELETCT 218 208   MPV 9.0 8.8*     Recent Labs     04/04/22 0218 04/05/22  0254   SODIUM 138 139   POTASSIUM 3.9 4.0   CHLORIDE 104 107   CO2 23 22   GLUCOSE 91 86   BUN 5* 8   CREATININE 0.78 0.81   CALCIUM 8.8 8.6                   Imaging  [id number] VIEWS   Final Result      1.  Apparent mildly dilated loop of small bowel in the pelvis could represent partial or early obstruction versus ileus.      2.  No free air identified.      CT-ABDOMEN-PELVIS WITH   Final Result      1.  Interval increase in size of presacral fluid collection since prior exam dated 3/20/2022, with drainage catheter in place.   2.  Dilated bowel loops in the central pelvis suggesting at least partial obstruction.   3.  Postoperative change as described.         IR-PICC LINE PLACEMENT W/ GUIDANCE > AGE 5   Final Result                  Ultrasound-guided PICC placement performed by qualified nursing staff as    above.          [id number]-GRAM   Final Result      1.  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      [id number] VIEW   Final Result      1.  Pigtail drainage catheter in place within the pelvis.   2.  Nonobstructive bowel gas pattern.      CT-PELVIS WITH   Final Result      1.  Right lower quadrant ileostomy.   2.  Left transgluteal posterior pelvic/presacral pigtail drainage catheter remains in place. No residual fluid collection evident about the pigtail loop.   3.  Enteric contrast opacifying pelvic small bowel loops. No appreciable communication of contrast into the presacral collection.   4.  Catheter abscessogram may be of interest if there is strong clinical suspicion for a fistula.      CT-ABDOMEN-PELVIS WITH   Final Result         1. No contrast is seen within the presacral fluid collection to suggest fistulous connection.       CT-ABDOMEN&PELVIS ENTEROGRAPHY   Final Result      1.  Unchanged presacral/precoccygeal space fluid collection measuring 2.9 x 2.2 cm consistent with abscess      2.  Prior colectomy and there is a right lower quadrant ostomy      3.  No direct evidence for fistula although cannot be excluded with the negative contrast administered for enterography. Consider repeat CT scan with standard oral contrast when clinically appropriate      4.  Postoperative changes in the presacral/precoccygeal space      5.  Mass contiguous with the inferior aspect of rectal stump which could be tumor versus enlarged prostate.      6.  No other significant finding      CT-DRAIN-PERITONEAL   Final Result      1.  CT GUIDED RETROPERITONEAL PELVIC/PRESACRAL CATHETER DRAINAGE. INITIAL FLUID RETURN RESEMBLED ENTERIC CONTENTS. A FISTULOUS LEAK MAY BE PRESENT. LIKEWISE, THE AIR CONTENT SUGGESTS THE POSSIBILITY OF COMMUNICATION WITH BOWEL.   2.  THE CURRENT PLAN IS TO MONITOR DRAINAGE OUTPUT, IRRIGATE DAILY AND OBTAIN A FOLLOWUP CT SCAN IN 5-7 DAYS IF CLINICALLY INDICATED.   3. ABSCESSOGRAM MAY BE OF INTEREST PRIOR TO CATHETER REMOVAL TO DETERMINE IF THERE IS FISTULOUS COMMUNICATION WITH SMALL BOWEL IN THE PELVIS.      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 In review of yesterday's note (4/5/2022), there are no changes except as documented above.

## 2022-04-06 NOTE — PROGRESS NOTES
Pt is A&O 4  Pain managed per PRN eMAR.    - nausea  Tolerating diet   IR drain to bulb suction- flushes well.   + Voids  + flatus  + BM-Ostomy to RLE - managed by patient draining well.  Up ad raad.   Bed alarm off, pt low fall risk per irais kan  Reviewed plan of care with patient, bed in lowest position and locked, pt resting comfortably now, call light within reach, all needs met at this time. Interventions will be executed per plan of care    [facility]

## 2022-04-06 NOTE — CARE PLAN
The patient is Stable - Low risk of patient condition declining or worsening    Shift Goals  Clinical Goals: lower pain rate  Patient Goals: lower pain rate  Family Goals: No family present    Progress made toward(s) clinical / shift goals:    Problem: Knowledge Deficit - Standard  Goal: Patient and family/care givers will demonstrate understanding of plan of care, disease process/condition, diagnostic tests and medications  Outcome: Progressing  Note: Pt educated of POC, verbalized understanding.     Problem: Pain - Standard  Goal: Alleviation of pain or a reduction in pain to the patient’s comfort goal  Outcome: Progressing  Note: Pain rate under control, medicated per MAR/provided non pharmacological interventions       Patient is not progressing towards the following goals:

## 2022-04-06 NOTE — PROGRESS NOTES
Report received from NOC RN  Assumed care.   Pt in bed. A/O x4. VSS.  Responds appropriately.   Pain 7/10 medicated per MAR/provided non pharmacological interventions.  Denies SOB/N/V.   Tolerating soft reg diet  Ostomy to LLQ- patient completes self care. Site assessed c/d/i  IR drain to left buttock, drained with NS per order  Assessment complete.   Discussed POC, pt verbalizes understanding.  Explained importance of calling before getting OOB.   Call light and belongings within reach.   Bed alarm off, pt low risk per irais kan.  Bed in the lowest position. Treaded socks in place. Hourly rounding in progress. Will continue to monitor .

## 2022-04-07 VITALS
OXYGEN SATURATION: 95 % | HEIGHT: 74 IN | BODY MASS INDEX: 25.12 KG/M2 | HEART RATE: 77 BPM | RESPIRATION RATE: 18 BRPM | TEMPERATURE: 97.9 F | DIASTOLIC BLOOD PRESSURE: 61 MMHG | SYSTOLIC BLOOD PRESSURE: 100 MMHG | WEIGHT: 195.77 LBS

## 2022-04-07 PROCEDURE — 700105 HCHG RX REV CODE 258: Performed by: INTERNAL MEDICINE

## 2022-04-07 PROCEDURE — 700111 HCHG RX REV CODE 636 W/ 250 OVERRIDE (IP): Performed by: HOSPITALIST

## 2022-04-07 PROCEDURE — A9270 NON-COVERED ITEM OR SERVICE: HCPCS | Performed by: HOSPITALIST

## 2022-04-07 PROCEDURE — 700102 HCHG RX REV CODE 250 W/ 637 OVERRIDE(OP): Performed by: HOSPITALIST

## 2022-04-07 PROCEDURE — A9270 NON-COVERED ITEM OR SERVICE: HCPCS | Performed by: FAMILY MEDICINE

## 2022-04-07 PROCEDURE — 700111 HCHG RX REV CODE 636 W/ 250 OVERRIDE (IP): Performed by: INTERNAL MEDICINE

## 2022-04-07 PROCEDURE — 99239 HOSP IP/OBS DSCHRG MGMT >30: CPT | Performed by: NURSE PRACTITIONER

## 2022-04-07 PROCEDURE — 700102 HCHG RX REV CODE 250 W/ 637 OVERRIDE(OP): Performed by: FAMILY MEDICINE

## 2022-04-07 RX ORDER — HYDROCODONE BITARTRATE AND ACETAMINOPHEN 10; 325 MG/1; MG/1
1 TABLET ORAL EVERY 6 HOURS PRN
Qty: 12 TABLET | Refills: 0 | Status: SHIPPED | OUTPATIENT
Start: 2022-04-07 | End: 2022-04-10

## 2022-04-07 RX ADMIN — SENNOSIDES AND DOCUSATE SODIUM 2 TABLET: 50; 8.6 TABLET ORAL at 04:44

## 2022-04-07 RX ADMIN — HYDROCODONE BITARTRATE AND ACETAMINOPHEN 1 TABLET: 10; 325 TABLET ORAL at 09:33

## 2022-04-07 RX ADMIN — HYDROCODONE BITARTRATE AND ACETAMINOPHEN 1 TABLET: 10; 325 TABLET ORAL at 02:29

## 2022-04-07 RX ADMIN — PIPERACILLIN AND TAZOBACTAM 3.38 G: 3; .375 INJECTION, POWDER, LYOPHILIZED, FOR SOLUTION INTRAVENOUS; PARENTERAL at 04:45

## 2022-04-07 RX ADMIN — DIPHENHYDRAMINE HYDROCHLORIDE 25 MG: 25 TABLET ORAL at 02:29

## 2022-04-07 RX ADMIN — MORPHINE SULFATE 2 MG: 4 INJECTION INTRAVENOUS at 00:32

## 2022-04-07 RX ADMIN — HYDROCODONE BITARTRATE AND ACETAMINOPHEN 1 TABLET: 10; 325 TABLET ORAL at 14:31

## 2022-04-07 ASSESSMENT — PAIN DESCRIPTION - PAIN TYPE
TYPE: ACUTE PAIN
TYPE: ACUTE PAIN

## 2022-04-07 NOTE — PROGRESS NOTES
Patient discharged to home with self and staff escort. PICC line in place. Prescriptions sent to patient's preferred pharmacy, verbalized understanding. Discharge instructions given regarding PICC care, drain care, follow up appointments, and when to seek medical attention.  Education provided, patient asked questions and verbalized understanding. Discharge paperwork signed and in chart. Patient is tolerating diet, stable when ambulating, and pain is well controlled. All belongings collected. No further questions or concerns at this time.

## 2022-04-07 NOTE — DISCHARGE SUMMARY
Discharge Summary    CHIEF COMPLAINT ON ADMISSION  No chief complaint on file.      Reason for Admission  SBO, abdominal abscess     Admission Date  3/21/2022    CODE STATUS  Full Code    HPI & HOSPITAL COURSE  This is a 51 y.o. male who was admitted for small bowel obstruction and abdominal fluid collection. The small bowel obstruction spontaneously resolved.  Surgery was consulted for the abdominal fluid collection.  Patient underwent CT-guided retroperitoneal pelvic/presacral catheter drainage on 3/22/2022 by interventional radiology.  He was started on empiric coverage with IV Zosyn.  Fluid culture showed E. coli which was pansensitive.  Infectious disease was consulted.  Further work-up showed the presence of a fistula on abscessogram.  On discussion with infectious disease, and surgery, interventional radiology, the drain will need to remain in effort to promote spontaneous closure . IS recommended IV abx through 5/9/2022. His hospitalization was prolonged due to insurance authorization and approval. He was discharged home once outpatient IV infusions were arranged with instructions to follow-up with surgery for further drain management.        Therefore, he is discharged in fair and stable condition to home with organized home healthcare and close outpatient follow-up.    The patient met 2-midnight criteria for an inpatient stay at the time of discharge.    Discharge Date  4/7/2022    FOLLOW UP ITEMS POST DISCHARGE  General surgery   ID     DISCHARGE DIAGNOSES  Principal Problem:    Abdominal fluid collection POA: Yes  Active Problems:    SBO (small bowel obstruction) (HCC) POA: Yes    Tobacco chew use POA: Yes    Rectal mass POA: Yes    Fistula POA: Yes  Resolved Problems:    Hypokalemia POA: Yes    Hypomagnesemia POA: Yes      FOLLOW UP  No future appointments.  Derrek Garcia M.D.  [address]  [place] [address]  [phone]    Schedule an appointment as soon as possible for a  visit        MEDICATIONS ON DISCHARGE     Medication List      START taking these medications      Instructions   HYDROcodone/acetaminophen  MG Tabs  Commonly known as: NORCO   Take 1 Tablet by mouth every 6 hours as needed for Severe Pain for up to 3 days.  Dose: 1 Tablet            Allergies  Allergies   Allergen Reactions   • Hydromorphone Hives   • Cantaloupe Itching     Throat itches   • Tape Rash     Rash and blistering with prolonged exposure. Some tape causes immediate rash, burning. Paper tape OK per patient.       DIET  Orders Placed This Encounter   Procedures   • Diet Order Diet: Regular     Standing Status:   Standing     Number of Occurrences:   1     Order Specific Question:   Diet:     Answer:   Regular [1]       ACTIVITY  As tolerated.  Weight bearing as tolerated    CONSULTATIONS  General surgery  ID  IR    PROCEDURES  3/30/2022: abscessogram   3/22/2022: CT-guided catheter drainage with drain placement       LABORATORY  Lab Results   Component Value Date    SODIUM 139 04/05/2022    POTASSIUM 4.0 04/05/2022    CHLORIDE 107 04/05/2022    CO2 22 04/05/2022    GLUCOSE 86 04/05/2022    BUN 8 04/05/2022    CREATININE 0.81 04/05/2022        Lab Results   Component Value Date    WBC 6.9 04/05/2022    HEMOGLOBIN 13.2 (L) 04/05/2022    HEMATOCRIT 38.0 (L) 04/05/2022    PLATELETCT 208 04/05/2022        Total time of the discharge process exceeds 45 minutes.

## 2022-04-07 NOTE — PROGRESS NOTES
Bedside report received.  Assessment complete.  A&O x 4. Patient calls appropriately.  Patient ambulates with no assist.    Patient has 7/10 pain. Pain managed with prescribed medications.  Denies N&V. Tolerating regular diet.  Left buttock IR drain with dressing, CDI. Flushed with 10 cc Q-shift   + void, + flatus, + BM via RLQ ostomy.  Patient denies SOB.  SCD's off.    Review plan with of care with patient. Call light and personal belongings within reach. Hourly rounding in place. All needs met at this time.

## 2022-04-07 NOTE — PROGRESS NOTES
Patient educated that Balbina SOLORIO would like him to receive one last dose of IV zosyn prior to discharging. However, patient intends to get train ride back to [place], and would need to discharge before 1530 to catch the train. The IV zosyn infusion would not be complete before 1530. Per patient, he is okay with not receiving 1300 dose of Zosyn in order to ensure he can get a train ride home and be able to make his outpatient infusion appointment on 4/8 at 1300 at [facility]. Balbina SOLORIO notified.

## 2022-04-07 NOTE — CARE PLAN
Problem: Knowledge Deficit - Standard  Goal: Patient and family/care givers will demonstrate understanding of plan of care, disease process/condition, diagnostic tests and medications  Outcome: Progressing     Problem: Self Care  Goal: Patient will have the ability to perform ADLs independently or with assistance (bathe, groom, dress, toilet and feed)  Outcome: Progressing       The patient is Stable - Low risk of patient condition declining or worsening    Shift Goals  Clinical Goals: pain management, monitor NETTA output + flush  Patient Goals: lower pain rate  Family Goals: No family present    Progress made toward(s) clinical / shift goals: POC discussed with patient. Patient does complete self care in regards to eating, ambulating, toileting, hygiene, and caring for his ostomy. Patient calls appropriately for needs and has call light within reach

## 2022-04-07 NOTE — PROGRESS NOTES
Bedside report received.  Assessment complete.  A&O x 4. Patient calls appropriately.  Patient ambulates with no assist. Bed alarm off.   Patient has 7/10 pain. Medicated per MAR.   IR drain to L buttock, flushed with NS per order.  Tolerating regular diet. Denies N/V.  + void, + flatus, + BM to RLQ ostomy.  Reviewed plan of care with patient. Call light and personal belongings within reach. Hourly rounding in place. All needs met at this time.

## 2022-04-07 NOTE — DISCHARGE PLANNING
Care Transition Team Discharge Planning    Anticipates discharge disposition:  Home with OP Infusion Clinic Follow Up    Action:  • This RN CM spoke with Cecile at Tel [phone] ext [phone] with Access/ Insurance Authorization at [facility]. Per Cecile she contacted [facility] and was told to just wait for Pt's Insurance Auth for Pt's OP Infusion for daily Ertapenem on discharge.    Per Cecile , she will inform this RN CM once she receives it then this RN CM can contact the [facility] OP Infusion for Pt's appointment.    This RN CM received a call from Sheree, Donovan at [facility] OP Infusion .She [facility] the [facility] auth [facility]. Pt has an appointmnet tomorrow 4/8 at 13:00 . The address [address] [place] [address].  Contact number is Tel [phone] ext [phone] and F [phone].    Per Sheree, Pt must check in at the front of the hospital and register for Infusion.    Informed DAVIDSON Christopher and Balbina SOLORIO of this update.  Per Pt he can set up his own [employer] transport and he can afford it. Met with Pt at bedside and informed him of this update and Pt agrred with the discharge plan.    VAT/ PICC line procedure notes was faxed to Sheree.     Barriers to Discharge:  • None    Plan:  • CM to continue to assist Pt with discharge as needed

## 2022-04-07 NOTE — CARE PLAN
The patient is Stable - Low risk of patient condition declining or worsening    Shift Goals  Clinical Goals: pain management; discharge  Patient Goals: lower pain rate  Family Goals: No family present    Progress made toward(s) clinical / shift goals: assess pain Q2-4H, administer pain medication as indicated, encourage patient to voice pain to staff; patient discharged     Patient is not progressing towards the following goals:      Problem: Pain - Standard  Goal: Alleviation of pain or a reduction in pain to the patient’s comfort goal  Outcome: Progressing     Problem: Discharge Barriers/Planning  Goal: Patient's continuum of care needs are met  Outcome: Progressing

## 2022-04-08 NOTE — DISCHARGE PLANNING
Care Transition Team Discharge Planning                   Discharge Plan:   Home with OP Infusion Clinic Follow Up    This RN DERRICK received a call from Sheree at the [facility] OP Infusion. Per Sheree Pt is now asking who he can help him with his wound care. Pt;s PICC line care is provided at the [facility] OP Infusion [facility].    This RN DERRICK explained to Sheree and to Pt via phone that Pt refused Home Health when he was at [facility] so referral for [facility] was discontinued. Pt claimed that he was taught and trained how to do his own wound care. This RN CM explained this to Pt at bedside before Pt discharged. Pt verbalized understanding and claimed that he can take care of his wound. This was discussed in ICT rounds too and bedside RNs , Dr Yates and Balbina SOLORIO were aware.    Explained to Sheree that Pt can still continue to obtain Home Health thru Dr Ward (Pt;s PCP) at [facility]. Pt has an appoitment with PCP on 4/14/22.  This RN CM [facility]  to Pt. The call was placed on speaker mode so Pt can understand the conversation.

## 2022-04-20 ENCOUNTER — TELEPHONE (OUTPATIENT)
Dept: INFECTIOUS DISEASES | Facility: MEDICAL CENTER | Age: 52
End: 2022-04-20

## 2022-04-20 NOTE — TELEPHONE ENCOUNTER
The patient left a voicemail on 4/12/2022 stating that he had a procedure done 3 weeks ago (at the time) and his drain had come out and wanted to know if he had to be seen? He also mentioned that he had some questions regarding his medications.   Beau: [phone]    Thank you

## 2022-05-04 ENCOUNTER — OFFICE VISIT (OUTPATIENT)
Dept: INFECTIOUS DISEASES | Facility: MEDICAL CENTER | Age: 52
End: 2022-05-04
Payer: COMMERCIAL

## 2022-05-04 VITALS
WEIGHT: 199 LBS | DIASTOLIC BLOOD PRESSURE: 60 MMHG | OXYGEN SATURATION: 95 % | SYSTOLIC BLOOD PRESSURE: 110 MMHG | RESPIRATION RATE: 14 BRPM | BODY MASS INDEX: 25.54 KG/M2 | TEMPERATURE: 98.1 F | HEART RATE: 90 BPM

## 2022-05-04 DIAGNOSIS — K56.609 SBO (SMALL BOWEL OBSTRUCTION) (HCC): ICD-10-CM

## 2022-05-04 DIAGNOSIS — R18.8 ABDOMINAL FLUID COLLECTION: ICD-10-CM

## 2022-05-04 DIAGNOSIS — K62.89 RECTAL MASS: ICD-10-CM

## 2022-05-04 DIAGNOSIS — L98.8 FISTULA: ICD-10-CM

## 2022-05-04 PROCEDURE — RXMED WILLOW AMBULATORY MEDICATION CHARGE: Performed by: INTERNAL MEDICINE

## 2022-05-04 PROCEDURE — 99214 OFFICE O/P EST MOD 30 MIN: CPT | Performed by: INTERNAL MEDICINE

## 2022-05-04 RX ORDER — AMOXICILLIN AND CLAVULANATE POTASSIUM 875; 125 MG/1; MG/1
1 TABLET, FILM COATED ORAL 2 TIMES DAILY
Qty: 60 TABLET | Refills: 1 | Status: ON HOLD | OUTPATIENT
Start: 2022-05-04 | End: 2022-05-31

## 2022-05-04 ASSESSMENT — FIBROSIS 4 INDEX: FIB4 SCORE: 1.03

## 2022-05-04 NOTE — PROGRESS NOTES
Chief Complaint   Patient presents with   • Follow-Up       HISTORY OF PRESENT ILLNESS: Patient is a 51 y.o. male established patient who presents today to for [facility] FU   h/o recurrent colon cancer admitted 3/21/2022 as transfer from [place] for N/V due to SBO. Found to have presacral abscess and probable fistula  CT 3/25 : Unchanged presacral/precoccygeal space fluid collection measuring 2.9 x 2.2 cm consistent with abscess 2.  Prior colectomy and there is a right lower quadrant ostomy 3.  No direct evidence for fistula although cannot be excluded with the negative contrast administered for enterography. Consider repeat CT scan with standard oral contrast when clinically appropriate 4.  Postoperative changes in the presacral/precoccygeal space 5.  Mass contiguous with the inferior aspect of rectal stump which could be tumor versus enlarged prostate  Need for larger drain per IR. No current plan for surgery  CT 3/28 no obvious fistula-no residual fluid and scant drain output. Catheter abscessogram recommended   Abscessogram showed fistula-plan per surgery is antibiotics, no surgery   As fistula present plan for weeks of antibiotics + drain to hopefully promote spontaneous closure  On Zosyn while in the hospital  Currently on ertapenem   Drain and FU abscessogram per surgery  Further eval rectal stump mass per PCT    5/4  Tolerating infusions well-current planned stop date 5/10  DC PICC at completion  About 2-3 weeks ago drain fell out-was supposed to get CT but due to difficulty getting [facility] has not been done yet-planned for this week  Has FU with Dr Garcia scheduled-states surgeon saw after drain fell out and ordered CT  Tolerating PO well  No abd pain  Ostomy working well      Patient Active Problem List    Diagnosis Date Noted   • Fistula 03/30/2022   • Rectal mass 03/25/2022   • Abdominal fluid collection 03/21/2022   • Tobacco chew use 03/21/2022   • SBO (small bowel obstruction) (HCC) 07/07/2019        Allergies:Hydromorphone, Cantaloupe, and Tape    Current Outpatient Medications   Medication Sig Dispense Refill   • Piperacillin Sod-Tazobactam So (ZOSYN IV) Infuse  into a venous catheter every day at 6 PM.       No current facility-administered medications for this visit.       Social History     Tobacco Use   • Smoking status: Former Smoker     Packs/day: 1.00     Types: Cigarettes     Quit date: 1994     Years since quittin.8   • Smokeless tobacco: Never Used       Family History   Problem Relation Age of Onset   • Colon Cancer Mother    • Cancer Mother         Hepatocellular cancer       ROS:  Review of Systems   Constitutional: Negative for fever, chills, weight loss and malaise/fatigue.   All other systems reviewed and are negative except as in HPI.      Exam:  /60   Pulse 90   Temp 36.7 °C (98.1 °F) (Temporal)   Resp 14   Wt 90.3 kg (199 lb)   SpO2 95%   General:  Well nourished, well developed male in NAD. Pleasant and cooperative  Head: NCAT, Pupils are equal round reactive to light. Extraocular movements intact. Oropharynx mask  Neck: Supple.  No JVD  Pulmonary: Clear .  No rales, rhonchi, or wheezing. Unlabored  Cardiovascular: Regular rate   Abdominal: Soft, nontender, nondistended. Positive bowel sounds. Ostomy well-healed midline scar  Skin: No rashes.  Extremities: no clubbing, cyanosis, or edema.PICC RUE nontender  Neurologic: Alert and oriented x4. Speech is fluent without dysarthria. Cranial nerves intact. Moving all extremities. Gait within normal limits.      Assessment/Plan:  1. Fistula     2. Rectal mass     3. SBO (small bowel obstruction) (HCC)     4. Abdominal fluid collection     Complete IV abx as scheduled  DC PICC at completion  Switch to PO Augmentin -anticipate 4 weeks unless complication  FU ID if needed  FU surgery as scheduled  Encouraged to get primary care provider     Hilaria Ho M.D.

## 2022-05-10 ENCOUNTER — PHARMACY VISIT (OUTPATIENT)
Dept: PHARMACY | Facility: MEDICAL CENTER | Age: 52
End: 2022-05-10
Payer: COMMERCIAL

## 2022-05-16 ENCOUNTER — PRE-ADMISSION TESTING (OUTPATIENT)
Dept: ADMISSIONS | Facility: MEDICAL CENTER | Age: 52
DRG: 337 | End: 2022-05-16
Attending: SURGERY
Payer: COMMERCIAL

## 2022-05-16 NOTE — OR NURSING
Pt lives in [facility] on 05/31/22. Preadmit department  is closed on 05/31. Pt is willing to go to [facility] in [facility] to have blood work done.   I called and left a message on  for Miguelina  to fax our anesthesia protocol orders CBC, BMP and EKG  To [facility] at [phone], ph # [phone]

## 2022-05-31 ENCOUNTER — ANESTHESIA (OUTPATIENT)
Dept: SURGERY | Facility: MEDICAL CENTER | Age: 52
DRG: 337 | End: 2022-05-31
Payer: COMMERCIAL

## 2022-05-31 ENCOUNTER — HOSPITAL ENCOUNTER (INPATIENT)
Facility: MEDICAL CENTER | Age: 52
LOS: 2 days | DRG: 337 | End: 2022-06-02
Attending: SURGERY | Admitting: SURGERY
Payer: COMMERCIAL

## 2022-05-31 ENCOUNTER — APPOINTMENT (OUTPATIENT)
Dept: RADIOLOGY | Facility: MEDICAL CENTER | Age: 52
DRG: 337 | End: 2022-05-31
Attending: ANESTHESIOLOGY
Payer: COMMERCIAL

## 2022-05-31 ENCOUNTER — ANESTHESIA EVENT (OUTPATIENT)
Dept: SURGERY | Facility: MEDICAL CENTER | Age: 52
DRG: 337 | End: 2022-05-31
Payer: COMMERCIAL

## 2022-05-31 DIAGNOSIS — G89.18 POST-OP PAIN: ICD-10-CM

## 2022-05-31 LAB
ANION GAP SERPL CALC-SCNC: 16 MMOL/L (ref 7–16)
BUN SERPL-MCNC: 9 MG/DL (ref 8–22)
CALCIUM SERPL-MCNC: 9.9 MG/DL (ref 8.5–10.5)
CHLORIDE SERPL-SCNC: 93 MMOL/L (ref 96–112)
CO2 SERPL-SCNC: 21 MMOL/L (ref 20–33)
CREAT SERPL-MCNC: 0.92 MG/DL (ref 0.5–1.4)
EKG IMPRESSION: NORMAL
ERYTHROCYTE [DISTWIDTH] IN BLOOD BY AUTOMATED COUNT: 40.4 FL (ref 35.9–50)
GFR SERPLBLD CREATININE-BSD FMLA CKD-EPI: 101 ML/MIN/1.73 M 2
GLUCOSE SERPL-MCNC: 95 MG/DL (ref 65–99)
HCT VFR BLD AUTO: 47.1 % (ref 42–52)
HGB BLD-MCNC: 17.4 G/DL (ref 14–18)
MCH RBC QN AUTO: 30.8 PG (ref 27–33)
MCHC RBC AUTO-ENTMCNC: 36.9 G/DL (ref 33.7–35.3)
MCV RBC AUTO: 83.4 FL (ref 81.4–97.8)
PATHOLOGY CONSULT NOTE: NORMAL
PLATELET # BLD AUTO: 267 K/UL (ref 164–446)
PMV BLD AUTO: 8.9 FL (ref 9–12.9)
POTASSIUM SERPL-SCNC: 3.4 MMOL/L (ref 3.6–5.5)
RBC # BLD AUTO: 5.65 M/UL (ref 4.7–6.1)
SODIUM SERPL-SCNC: 130 MMOL/L (ref 135–145)
WBC # BLD AUTO: 7.1 K/UL (ref 4.8–10.8)

## 2022-05-31 PROCEDURE — 88305 TISSUE EXAM BY PATHOLOGIST: CPT

## 2022-05-31 PROCEDURE — 160036 HCHG PACU - EA ADDL 30 MINS PHASE I: Performed by: SURGERY

## 2022-05-31 PROCEDURE — 700101 HCHG RX REV CODE 250: Performed by: SURGERY

## 2022-05-31 PROCEDURE — 00840 ANES IPER PX LOWER ABD NOS: CPT | Performed by: ANESTHESIOLOGY

## 2022-05-31 PROCEDURE — 700102 HCHG RX REV CODE 250 W/ 637 OVERRIDE(OP): Performed by: SURGERY

## 2022-05-31 PROCEDURE — 160031 HCHG SURGERY MINUTES - 1ST 30 MINS LEVEL 5: Performed by: SURGERY

## 2022-05-31 PROCEDURE — 0DBW4ZZ EXCISION OF PERITONEUM, PERCUTANEOUS ENDOSCOPIC APPROACH: ICD-10-PCS | Performed by: SURGERY

## 2022-05-31 PROCEDURE — 93010 ELECTROCARDIOGRAM REPORT: CPT | Performed by: INTERNAL MEDICINE

## 2022-05-31 PROCEDURE — 500378 HCHG DRAIN, J-VAC ROUND 19FR: Performed by: SURGERY

## 2022-05-31 PROCEDURE — 502704 HCHG DEVICE, LIGASURE IMPACT: Performed by: SURGERY

## 2022-05-31 PROCEDURE — 160002 HCHG RECOVERY MINUTES (STAT): Performed by: SURGERY

## 2022-05-31 PROCEDURE — 700111 HCHG RX REV CODE 636 W/ 250 OVERRIDE (IP): Performed by: SURGERY

## 2022-05-31 PROCEDURE — 770001 HCHG ROOM/CARE - MED/SURG/GYN PRIV*

## 2022-05-31 PROCEDURE — 85027 COMPLETE CBC AUTOMATED: CPT

## 2022-05-31 PROCEDURE — 80048 BASIC METABOLIC PNL TOTAL CA: CPT

## 2022-05-31 PROCEDURE — A9270 NON-COVERED ITEM OR SERVICE: HCPCS | Performed by: SURGERY

## 2022-05-31 PROCEDURE — 700101 HCHG RX REV CODE 250: Performed by: ANESTHESIOLOGY

## 2022-05-31 PROCEDURE — 36415 COLL VENOUS BLD VENIPUNCTURE: CPT

## 2022-05-31 PROCEDURE — 93005 ELECTROCARDIOGRAM TRACING: CPT | Performed by: SURGERY

## 2022-05-31 PROCEDURE — 700102 HCHG RX REV CODE 250 W/ 637 OVERRIDE(OP): Performed by: ANESTHESIOLOGY

## 2022-05-31 PROCEDURE — 501570 HCHG TROCAR, SEPARATOR: Performed by: SURGERY

## 2022-05-31 PROCEDURE — 160048 HCHG OR STATISTICAL LEVEL 1-5: Performed by: SURGERY

## 2022-05-31 PROCEDURE — 700111 HCHG RX REV CODE 636 W/ 250 OVERRIDE (IP): Performed by: ANESTHESIOLOGY

## 2022-05-31 PROCEDURE — 0DNW4ZZ RELEASE PERITONEUM, PERCUTANEOUS ENDOSCOPIC APPROACH: ICD-10-PCS | Performed by: SURGERY

## 2022-05-31 PROCEDURE — 501838 HCHG SUTURE GENERAL: Performed by: SURGERY

## 2022-05-31 PROCEDURE — 502240 HCHG MISC OR SUPPLY RC 0272: Performed by: SURGERY

## 2022-05-31 PROCEDURE — 502714 HCHG ROBOTIC SURGERY SERVICES: Performed by: SURGERY

## 2022-05-31 PROCEDURE — 700105 HCHG RX REV CODE 258: Performed by: ANESTHESIOLOGY

## 2022-05-31 PROCEDURE — A9270 NON-COVERED ITEM OR SERVICE: HCPCS | Performed by: ANESTHESIOLOGY

## 2022-05-31 PROCEDURE — 160042 HCHG SURGERY MINUTES - EA ADDL 1 MIN LEVEL 5: Performed by: SURGERY

## 2022-05-31 PROCEDURE — 700105 HCHG RX REV CODE 258: Performed by: SURGERY

## 2022-05-31 PROCEDURE — 8E0W4CZ ROBOTIC ASSISTED PROCEDURE OF TRUNK REGION, PERCUTANEOUS ENDOSCOPIC APPROACH: ICD-10-PCS | Performed by: SURGERY

## 2022-05-31 PROCEDURE — 160009 HCHG ANES TIME/MIN: Performed by: SURGERY

## 2022-05-31 PROCEDURE — 160035 HCHG PACU - 1ST 60 MINS PHASE I: Performed by: SURGERY

## 2022-05-31 RX ORDER — HALOPERIDOL 5 MG/ML
1 INJECTION INTRAMUSCULAR EVERY 6 HOURS PRN
Status: DISCONTINUED | OUTPATIENT
Start: 2022-05-31 | End: 2022-06-02 | Stop reason: HOSPADM

## 2022-05-31 RX ORDER — HYDROMORPHONE HYDROCHLORIDE 2 MG/1
2 TABLET ORAL
Status: DISCONTINUED | OUTPATIENT
Start: 2022-05-31 | End: 2022-06-02 | Stop reason: HOSPADM

## 2022-05-31 RX ORDER — METOPROLOL TARTRATE 1 MG/ML
1 INJECTION, SOLUTION INTRAVENOUS
Status: DISCONTINUED | OUTPATIENT
Start: 2022-05-31 | End: 2022-05-31 | Stop reason: HOSPADM

## 2022-05-31 RX ORDER — ONDANSETRON 2 MG/ML
INJECTION INTRAMUSCULAR; INTRAVENOUS PRN
Status: DISCONTINUED | OUTPATIENT
Start: 2022-05-31 | End: 2022-05-31 | Stop reason: SURG

## 2022-05-31 RX ORDER — KETOROLAC TROMETHAMINE 30 MG/ML
30 INJECTION, SOLUTION INTRAMUSCULAR; INTRAVENOUS EVERY 6 HOURS
Status: DISCONTINUED | OUTPATIENT
Start: 2022-05-31 | End: 2022-06-02 | Stop reason: HOSPADM

## 2022-05-31 RX ORDER — ACETAMINOPHEN 500 MG
1000 TABLET ORAL EVERY 6 HOURS PRN
Status: DISCONTINUED | OUTPATIENT
Start: 2022-06-05 | End: 2022-06-02 | Stop reason: HOSPADM

## 2022-05-31 RX ORDER — HYDRALAZINE HYDROCHLORIDE 20 MG/ML
5 INJECTION INTRAMUSCULAR; INTRAVENOUS
Status: DISCONTINUED | OUTPATIENT
Start: 2022-05-31 | End: 2022-05-31 | Stop reason: HOSPADM

## 2022-05-31 RX ORDER — ONDANSETRON 2 MG/ML
4 INJECTION INTRAMUSCULAR; INTRAVENOUS
Status: DISCONTINUED | OUTPATIENT
Start: 2022-05-31 | End: 2022-05-31 | Stop reason: HOSPADM

## 2022-05-31 RX ORDER — CALCIUM CARBONATE 500 MG/1
500 TABLET, CHEWABLE ORAL
Status: DISCONTINUED | OUTPATIENT
Start: 2022-05-31 | End: 2022-06-02 | Stop reason: HOSPADM

## 2022-05-31 RX ORDER — TRAZODONE HYDROCHLORIDE 50 MG/1
50 TABLET ORAL NIGHTLY PRN
Status: DISCONTINUED | OUTPATIENT
Start: 2022-05-31 | End: 2022-06-02 | Stop reason: HOSPADM

## 2022-05-31 RX ORDER — BUPIVACAINE HYDROCHLORIDE AND EPINEPHRINE 5; 5 MG/ML; UG/ML
INJECTION, SOLUTION EPIDURAL; INTRACAUDAL; PERINEURAL
Status: DISCONTINUED | OUTPATIENT
Start: 2022-05-31 | End: 2022-05-31 | Stop reason: HOSPADM

## 2022-05-31 RX ORDER — SODIUM CHLORIDE, SODIUM LACTATE, POTASSIUM CHLORIDE, CALCIUM CHLORIDE 600; 310; 30; 20 MG/100ML; MG/100ML; MG/100ML; MG/100ML
INJECTION, SOLUTION INTRAVENOUS CONTINUOUS
Status: ACTIVE | OUTPATIENT
Start: 2022-05-31 | End: 2022-05-31

## 2022-05-31 RX ORDER — SODIUM CHLORIDE, SODIUM LACTATE, POTASSIUM CHLORIDE, CALCIUM CHLORIDE 600; 310; 30; 20 MG/100ML; MG/100ML; MG/100ML; MG/100ML
INJECTION, SOLUTION INTRAVENOUS CONTINUOUS
Status: DISCONTINUED | OUTPATIENT
Start: 2022-05-31 | End: 2022-05-31 | Stop reason: HOSPADM

## 2022-05-31 RX ORDER — CELECOXIB 200 MG/1
400 CAPSULE ORAL ONCE
Status: COMPLETED | OUTPATIENT
Start: 2022-05-31 | End: 2022-05-31

## 2022-05-31 RX ORDER — IBUPROFEN 800 MG/1
800 TABLET ORAL 3 TIMES DAILY PRN
Status: DISCONTINUED | OUTPATIENT
Start: 2022-06-03 | End: 2022-06-02 | Stop reason: HOSPADM

## 2022-05-31 RX ORDER — DEXAMETHASONE SODIUM PHOSPHATE 4 MG/ML
4 INJECTION, SOLUTION INTRA-ARTICULAR; INTRALESIONAL; INTRAMUSCULAR; INTRAVENOUS; SOFT TISSUE
Status: DISCONTINUED | OUTPATIENT
Start: 2022-05-31 | End: 2022-06-02 | Stop reason: HOSPADM

## 2022-05-31 RX ORDER — HYDROMORPHONE HYDROCHLORIDE 2 MG/ML
INJECTION, SOLUTION INTRAMUSCULAR; INTRAVENOUS; SUBCUTANEOUS PRN
Status: DISCONTINUED | OUTPATIENT
Start: 2022-05-31 | End: 2022-05-31 | Stop reason: SURG

## 2022-05-31 RX ORDER — ONDANSETRON 2 MG/ML
4 INJECTION INTRAMUSCULAR; INTRAVENOUS EVERY 4 HOURS PRN
Status: DISCONTINUED | OUTPATIENT
Start: 2022-05-31 | End: 2022-06-02 | Stop reason: HOSPADM

## 2022-05-31 RX ORDER — ACETAMINOPHEN 500 MG
1000 TABLET ORAL EVERY 6 HOURS
Status: DISCONTINUED | OUTPATIENT
Start: 2022-05-31 | End: 2022-06-02 | Stop reason: HOSPADM

## 2022-05-31 RX ORDER — SCOLOPAMINE TRANSDERMAL SYSTEM 1 MG/1
1 PATCH, EXTENDED RELEASE TRANSDERMAL
Status: DISCONTINUED | OUTPATIENT
Start: 2022-05-31 | End: 2022-06-02 | Stop reason: HOSPADM

## 2022-05-31 RX ORDER — DIPHENHYDRAMINE HYDROCHLORIDE 50 MG/ML
12.5 INJECTION INTRAMUSCULAR; INTRAVENOUS
Status: DISCONTINUED | OUTPATIENT
Start: 2022-05-31 | End: 2022-05-31 | Stop reason: HOSPADM

## 2022-05-31 RX ORDER — METOPROLOL TARTRATE 1 MG/ML
INJECTION, SOLUTION INTRAVENOUS PRN
Status: DISCONTINUED | OUTPATIENT
Start: 2022-05-31 | End: 2022-05-31 | Stop reason: SURG

## 2022-05-31 RX ORDER — DEXAMETHASONE SODIUM PHOSPHATE 4 MG/ML
INJECTION, SOLUTION INTRA-ARTICULAR; INTRALESIONAL; INTRAMUSCULAR; INTRAVENOUS; SOFT TISSUE PRN
Status: DISCONTINUED | OUTPATIENT
Start: 2022-05-31 | End: 2022-05-31 | Stop reason: SURG

## 2022-05-31 RX ORDER — HYDROMORPHONE HYDROCHLORIDE 4 MG/1
4 TABLET ORAL
Status: DISCONTINUED | OUTPATIENT
Start: 2022-05-31 | End: 2022-06-02 | Stop reason: HOSPADM

## 2022-05-31 RX ORDER — IPRATROPIUM BROMIDE AND ALBUTEROL SULFATE 2.5; .5 MG/3ML; MG/3ML
3 SOLUTION RESPIRATORY (INHALATION)
Status: DISCONTINUED | OUTPATIENT
Start: 2022-05-31 | End: 2022-05-31 | Stop reason: HOSPADM

## 2022-05-31 RX ORDER — MIDAZOLAM HYDROCHLORIDE 1 MG/ML
INJECTION INTRAMUSCULAR; INTRAVENOUS PRN
Status: DISCONTINUED | OUTPATIENT
Start: 2022-05-31 | End: 2022-05-31 | Stop reason: SURG

## 2022-05-31 RX ORDER — SODIUM CHLORIDE, SODIUM LACTATE, POTASSIUM CHLORIDE, CALCIUM CHLORIDE 600; 310; 30; 20 MG/100ML; MG/100ML; MG/100ML; MG/100ML
INJECTION, SOLUTION INTRAVENOUS
Status: DISCONTINUED | OUTPATIENT
Start: 2022-05-31 | End: 2022-05-31 | Stop reason: SURG

## 2022-05-31 RX ORDER — NEOMYCIN SULFATE 500 MG/1
1000 TABLET ORAL 3 TIMES DAILY
Status: ON HOLD | COMMUNITY
End: 2022-05-31

## 2022-05-31 RX ORDER — DIPHENHYDRAMINE HYDROCHLORIDE 50 MG/ML
25 INJECTION INTRAMUSCULAR; INTRAVENOUS EVERY 6 HOURS PRN
Status: DISCONTINUED | OUTPATIENT
Start: 2022-05-31 | End: 2022-06-02 | Stop reason: HOSPADM

## 2022-05-31 RX ORDER — CEFOTETAN DISODIUM 2 G/20ML
INJECTION, POWDER, FOR SOLUTION INTRAMUSCULAR; INTRAVENOUS PRN
Status: DISCONTINUED | OUTPATIENT
Start: 2022-05-31 | End: 2022-05-31 | Stop reason: SURG

## 2022-05-31 RX ORDER — ACETAMINOPHEN 500 MG
1000 TABLET ORAL ONCE
Status: COMPLETED | OUTPATIENT
Start: 2022-05-31 | End: 2022-05-31

## 2022-05-31 RX ORDER — GABAPENTIN 300 MG/1
600 CAPSULE ORAL ONCE
Status: COMPLETED | OUTPATIENT
Start: 2022-05-31 | End: 2022-05-31

## 2022-05-31 RX ORDER — HYDROMORPHONE HYDROCHLORIDE 1 MG/ML
1 INJECTION, SOLUTION INTRAMUSCULAR; INTRAVENOUS; SUBCUTANEOUS
Status: DISCONTINUED | OUTPATIENT
Start: 2022-05-31 | End: 2022-06-02 | Stop reason: HOSPADM

## 2022-05-31 RX ORDER — METRONIDAZOLE 500 MG/1
500 TABLET ORAL 3 TIMES DAILY
Status: ON HOLD | COMMUNITY
End: 2022-05-31

## 2022-05-31 RX ORDER — DIPHENHYDRAMINE HCL 25 MG
25 TABLET ORAL EVERY 6 HOURS PRN
Status: DISCONTINUED | OUTPATIENT
Start: 2022-05-31 | End: 2022-06-02 | Stop reason: HOSPADM

## 2022-05-31 RX ORDER — HALOPERIDOL 5 MG/ML
1 INJECTION INTRAMUSCULAR
Status: DISCONTINUED | OUTPATIENT
Start: 2022-05-31 | End: 2022-05-31 | Stop reason: HOSPADM

## 2022-05-31 RX ORDER — ENOXAPARIN SODIUM 100 MG/ML
40 INJECTION SUBCUTANEOUS DAILY
Status: DISCONTINUED | OUTPATIENT
Start: 2022-05-31 | End: 2022-06-02 | Stop reason: HOSPADM

## 2022-05-31 RX ORDER — ROCURONIUM BROMIDE 10 MG/ML
INJECTION, SOLUTION INTRAVENOUS PRN
Status: DISCONTINUED | OUTPATIENT
Start: 2022-05-31 | End: 2022-05-31 | Stop reason: SURG

## 2022-05-31 RX ORDER — MEPERIDINE HYDROCHLORIDE 25 MG/ML
12.5 INJECTION INTRAMUSCULAR; INTRAVENOUS; SUBCUTANEOUS
Status: DISCONTINUED | OUTPATIENT
Start: 2022-05-31 | End: 2022-05-31 | Stop reason: HOSPADM

## 2022-05-31 RX ADMIN — ONDANSETRON 4 MG: 2 INJECTION INTRAMUSCULAR; INTRAVENOUS at 13:33

## 2022-05-31 RX ADMIN — ROCURONIUM BROMIDE 20 MG: 10 INJECTION, SOLUTION INTRAVENOUS at 12:21

## 2022-05-31 RX ADMIN — HYDROMORPHONE HYDROCHLORIDE 0.5 MG: 2 INJECTION INTRAMUSCULAR; INTRAVENOUS; SUBCUTANEOUS at 12:21

## 2022-05-31 RX ADMIN — KETOROLAC TROMETHAMINE 30 MG: 30 INJECTION, SOLUTION INTRAMUSCULAR; INTRAVENOUS at 18:14

## 2022-05-31 RX ADMIN — SUGAMMADEX 200 MG: 100 INJECTION, SOLUTION INTRAVENOUS at 13:33

## 2022-05-31 RX ADMIN — SODIUM CHLORIDE, POTASSIUM CHLORIDE, SODIUM LACTATE AND CALCIUM CHLORIDE: 600; 310; 30; 20 INJECTION, SOLUTION INTRAVENOUS at 08:53

## 2022-05-31 RX ADMIN — DIPHENHYDRAMINE HYDROCHLORIDE 25 MG: 25 TABLET ORAL at 21:54

## 2022-05-31 RX ADMIN — HYDROMORPHONE HYDROCHLORIDE 1 MG: 2 INJECTION INTRAMUSCULAR; INTRAVENOUS; SUBCUTANEOUS at 11:22

## 2022-05-31 RX ADMIN — MEPERIDINE HYDROCHLORIDE 12.5 MG: 25 INJECTION INTRAMUSCULAR; INTRAVENOUS; SUBCUTANEOUS at 13:57

## 2022-05-31 RX ADMIN — ROCURONIUM BROMIDE 30 MG: 10 INJECTION, SOLUTION INTRAVENOUS at 11:51

## 2022-05-31 RX ADMIN — PROPOFOL 200 MG: 10 INJECTION, EMULSION INTRAVENOUS at 11:26

## 2022-05-31 RX ADMIN — GABAPENTIN 600 MG: 300 CAPSULE ORAL at 10:53

## 2022-05-31 RX ADMIN — MIDAZOLAM HYDROCHLORIDE 2 MG: 1 INJECTION, SOLUTION INTRAMUSCULAR; INTRAVENOUS at 11:22

## 2022-05-31 RX ADMIN — DEXAMETHASONE SODIUM PHOSPHATE 8 MG: 4 INJECTION, SOLUTION INTRA-ARTICULAR; INTRALESIONAL; INTRAMUSCULAR; INTRAVENOUS; SOFT TISSUE at 11:26

## 2022-05-31 RX ADMIN — ACETAMINOPHEN 1000 MG: 500 TABLET ORAL at 10:53

## 2022-05-31 RX ADMIN — CEFOTETAN DISODIUM 2 G: 2 INJECTION, POWDER, FOR SOLUTION INTRAMUSCULAR; INTRAVENOUS at 11:26

## 2022-05-31 RX ADMIN — SODIUM CHLORIDE, POTASSIUM CHLORIDE, SODIUM LACTATE AND CALCIUM CHLORIDE: 600; 310; 30; 20 INJECTION, SOLUTION INTRAVENOUS at 11:20

## 2022-05-31 RX ADMIN — HYDROMORPHONE HYDROCHLORIDE 2 MG: 2 TABLET ORAL at 21:54

## 2022-05-31 RX ADMIN — CELECOXIB 400 MG: 200 CAPSULE ORAL at 10:53

## 2022-05-31 RX ADMIN — ENOXAPARIN SODIUM 40 MG: 40 INJECTION SUBCUTANEOUS at 18:15

## 2022-05-31 RX ADMIN — ACETAMINOPHEN 1000 MG: 500 TABLET ORAL at 18:14

## 2022-05-31 RX ADMIN — ROCURONIUM BROMIDE 50 MG: 10 INJECTION, SOLUTION INTRAVENOUS at 11:26

## 2022-05-31 RX ADMIN — METOPROLOL TARTRATE 5 MG: 5 INJECTION, SOLUTION INTRAVENOUS at 13:33

## 2022-05-31 RX ADMIN — FENTANYL CITRATE 50 MCG: 50 INJECTION, SOLUTION INTRAMUSCULAR; INTRAVENOUS at 15:37

## 2022-05-31 ASSESSMENT — LIFESTYLE VARIABLES
HAVE PEOPLE ANNOYED YOU BY CRITICIZING YOUR DRINKING: NO
CONSUMPTION TOTAL: NEGATIVE
EVER FELT BAD OR GUILTY ABOUT YOUR DRINKING: NO
HOW MANY TIMES IN THE PAST YEAR HAVE YOU HAD 5 OR MORE DRINKS IN A DAY: 0
AVERAGE NUMBER OF DAYS PER WEEK YOU HAVE A DRINK CONTAINING ALCOHOL: 0
TOTAL SCORE: 0
HAVE YOU EVER FELT YOU SHOULD CUT DOWN ON YOUR DRINKING: NO
ALCOHOL_USE: NO
DOES PATIENT WANT TO STOP DRINKING: NO
ON A TYPICAL DAY WHEN YOU DRINK ALCOHOL HOW MANY DRINKS DO YOU HAVE: 0
TOTAL SCORE: 0
TOTAL SCORE: 0
EVER HAD A DRINK FIRST THING IN THE MORNING TO STEADY YOUR NERVES TO GET RID OF A HANGOVER: NO

## 2022-05-31 ASSESSMENT — COGNITIVE AND FUNCTIONAL STATUS - GENERAL
SUGGESTED CMS G CODE MODIFIER MOBILITY: CJ
SUGGESTED CMS G CODE MODIFIER DAILY ACTIVITY: CH
STANDING UP FROM CHAIR USING ARMS: A LITTLE
WALKING IN HOSPITAL ROOM: A LITTLE
MOVING TO AND FROM BED TO CHAIR: A LITTLE
CLIMB 3 TO 5 STEPS WITH RAILING: A LITTLE
MOBILITY SCORE: 20
DAILY ACTIVITIY SCORE: 24

## 2022-05-31 ASSESSMENT — PAIN SCALES - GENERAL: PAIN_LEVEL: 6

## 2022-05-31 ASSESSMENT — PATIENT HEALTH QUESTIONNAIRE - PHQ9
1. LITTLE INTEREST OR PLEASURE IN DOING THINGS: NOT AT ALL
SUM OF ALL RESPONSES TO PHQ9 QUESTIONS 1 AND 2: 0
2. FEELING DOWN, DEPRESSED, IRRITABLE, OR HOPELESS: NOT AT ALL

## 2022-05-31 ASSESSMENT — FIBROSIS 4 INDEX
FIB4 SCORE: 1.03
FIB4 SCORE: 1.03

## 2022-05-31 ASSESSMENT — PAIN DESCRIPTION - PAIN TYPE
TYPE: SURGICAL PAIN;ACUTE PAIN
TYPE: SURGICAL PAIN
TYPE: SURGICAL PAIN;ACUTE PAIN
TYPE: ACUTE PAIN;SURGICAL PAIN

## 2022-05-31 NOTE — ANESTHESIA PREPROCEDURE EVALUATION
Case: [id number] Date/Time: 05/31/22 0945    Procedures:       COLECTOMY, ROBOT-ASSISTED, USING DA SYEDA XI - SMALL BOWEL RESECTION (Abdomen)      INCISION AND DRAINAGE, ABSCESS, PERIRECTAL - PELVIC ABCESS (Abdomen)    Anesthesia type: General    Pre-op diagnosis: MALE PELVIC ABSCESS    Location: [facility] OR 11 / SURGERY [facility]    Surgeons: Derrek Garcia M.D.          Relevant Problems   Other   (positive) Abdominal fluid collection   (positive) Fistula   (positive) Rectal mass   (positive) SBO (small bowel obstruction) (HCC)   (positive) Tobacco chew use   - Multiple previous bowel surgeries. No problems with GA.    Physical Exam    Airway   Mallampati: II  TM distance: >3 FB  Neck ROM: full       Cardiovascular - normal exam  Rhythm: regular  Rate: normal  (-) murmur     Dental              Pulmonary - normal exam  Breath sounds clear to auscultation     Abdominal    Neurological - normal exam                 Anesthesia Plan    ASA 2       Plan - general       Airway plan will be ETT          Induction: intravenous    Postoperative Plan: Postoperative administration of opioids is intended.    Pertinent diagnostic labs and testing reviewed    Informed Consent:    Anesthetic plan and risks discussed with patient.    Use of blood products discussed with: patient whom consented to blood products.

## 2022-05-31 NOTE — ANESTHESIA POSTPROCEDURE EVALUATION
Patient: Beau Mcintyre    Procedure Summary     Date: 05/31/22 Room / Location: [facility] 11 / SURGERY [facility]    Anesthesia Start: 1120 Anesthesia Stop: 1355    Procedures:       LAPAROSCOPY, DIAGNOSTIC, ROBOT-ASSISTED, USING DA SYEDA XI (Abdomen)      LYSIS, ADHESIONS, LAPAROSCOPIC, ROBOT-ASSISTED, USING DA SYEDA XI (Abdomen) Diagnosis: (MALE PELVIC ABSCESS)    Surgeons: Derrek Garcia M.D. Responsible Provider: Isaiah Mireles M.D.    Anesthesia Type: general ASA Status: 2          Final Anesthesia Type: general  Last vitals  BP   Blood Pressure: 116/87    Temp   36.9 °C (98.4 °F)    Pulse   87   Resp   16    SpO2   96 %      Anesthesia Post Evaluation    Patient location during evaluation: PACU  Patient participation: complete - patient participated  Level of consciousness: sleepy but conscious  Pain score: 6    Airway patency: patent  Anesthetic complications: no  Cardiovascular status: hemodynamically stable  Respiratory status: acceptable  Hydration status: balanced    PONV: none          There were no known complications for this encounter.     Nurse Pain Score: 6 (NPRS)

## 2022-05-31 NOTE — PROGRESS NOTES
4 Eyes Skin Assessment Completed by Mica RN and Lynn RN.    Head WDL  Ears WDL  Nose WDL  Mouth WDL  Neck WDL  Breast/Chest WDL  Shoulder Blades WDL  Spine WDL  (R) Arm/Elbow/Hand WDL  (L) Arm/Elbow/Hand WDL  Abdomen Incision 5 lap sites with dermabond, RLQ ileostomy, and LUQ NETTA with sanguineous drainage   Groin WDL - mckenzie in place  Scrotum/Coccyx/Buttocks WDL  (R) Leg WDL  (L) Leg WDL  (R) Heel/Foot/Toe WDL  (L) Heel/Foot/Toe WDL          Devices In Places Pulse Ox, Mckenzie, SCD's and Oxy Mask      Interventions In Place Pillows and Pressure Redistribution Mattress    Possible Skin Injury No    Pictures Uploaded Into Epic N/A  Wound Consult Placed N/A  RN Wound Prevention Protocol Ordered No

## 2022-05-31 NOTE — ANESTHESIA PROCEDURE NOTES
Airway    Date/Time: 5/31/2022 11:27 AM  Performed by: Isaiah Mireles M.D.  Authorized by: Isaiah Mireles M.D.     Location:  OR  Urgency:  Elective  Indications for Airway Management:  Anesthesia      Spontaneous Ventilation: absent    Sedation Level:  Deep  Preoxygenated: Yes    Patient Position:  Sniffing  Final Airway Type:  Endotracheal airway  Final Endotracheal Airway:  ETT  Cuffed: Yes    Technique Used for Successful ETT Placement:  Video laryngoscopy  Devices/Methods Used in Placement:  Intubating stylet    Insertion Site:  Oral  Blade Type:  Glide  Laryngoscope Blade/Videolaryngoscope Blade Size:  4  ETT Size (mm):  8.0  Measured from:  Teeth  ETT to Teeth (cm):  24  Placement Verified by: auscultation and capnometry    Cormack-Lehane Classification:  Grade I - full view of glottis  Number of Attempts at Approach:  1

## 2022-05-31 NOTE — ANESTHESIA TIME REPORT
Anesthesia Start and Stop Event Times     Date Time Event    5/31/2022 1044 Ready for Procedure     1120 Anesthesia Start     1355 Anesthesia Stop        Responsible Staff  05/31/22    Name Role Begin End    Isaiah Mireles M.D. Anesth 1120 1355        Overtime Reason:  no overtime (within assigned shift)    Comments:

## 2022-05-31 NOTE — PROGRESS NOTES
Pt admitted to room T 435 via transport in [facility] from PACU at 1605.    Patient reports pain at 0 on a scale of 0-10. Educated patient regarding pharmacologic and non pharmacologic modalities for pain management. Oriented to room call light and smoking policy.  Reviewed plan of care (equipment, incentive spirometer, sequential compression devices, medications, activity, diet, fall precautions, skin care, and pain) with patient and family. Welcome packet given and reviewed with patient, all questions answered. Education provided on oral hygiene program.     AA&Ox4. Denies CP/SOB.  See 2 RN skin note  Tolerating regular diet. Denies N/V.  Olivas in place. RLQ ostomy    Plan of care discussed, all questions answered. Educated on the importance of calling before getting OOB and pt verbalizes understanding. Educated regarding importance of oral care. Oral care kit at bedside. Call light is within reach, treaded slipper socks on, bed in lowest/ locked position, hourly rounding in place, all needs met at this time

## 2022-06-01 LAB
ANION GAP SERPL CALC-SCNC: 14 MMOL/L (ref 7–16)
BUN SERPL-MCNC: 20 MG/DL (ref 8–22)
CALCIUM SERPL-MCNC: 8.7 MG/DL (ref 8.5–10.5)
CHLORIDE SERPL-SCNC: 94 MMOL/L (ref 96–112)
CO2 SERPL-SCNC: 21 MMOL/L (ref 20–33)
CREAT SERPL-MCNC: 1.28 MG/DL (ref 0.5–1.4)
ERYTHROCYTE [DISTWIDTH] IN BLOOD BY AUTOMATED COUNT: 41.8 FL (ref 35.9–50)
GFR SERPLBLD CREATININE-BSD FMLA CKD-EPI: 68 ML/MIN/1.73 M 2
GLUCOSE SERPL-MCNC: 126 MG/DL (ref 65–99)
HCT VFR BLD AUTO: 44.6 % (ref 42–52)
HGB BLD-MCNC: 15.9 G/DL (ref 14–18)
MAGNESIUM SERPL-MCNC: 1.9 MG/DL (ref 1.5–2.5)
MCH RBC QN AUTO: 30.8 PG (ref 27–33)
MCHC RBC AUTO-ENTMCNC: 35.7 G/DL (ref 33.7–35.3)
MCV RBC AUTO: 86.4 FL (ref 81.4–97.8)
PLATELET # BLD AUTO: 265 K/UL (ref 164–446)
PMV BLD AUTO: 9.3 FL (ref 9–12.9)
POTASSIUM SERPL-SCNC: 4.4 MMOL/L (ref 3.6–5.5)
RBC # BLD AUTO: 5.16 M/UL (ref 4.7–6.1)
SODIUM SERPL-SCNC: 129 MMOL/L (ref 135–145)
WBC # BLD AUTO: 15.7 K/UL (ref 4.8–10.8)

## 2022-06-01 PROCEDURE — 83735 ASSAY OF MAGNESIUM: CPT

## 2022-06-01 PROCEDURE — 700111 HCHG RX REV CODE 636 W/ 250 OVERRIDE (IP): Performed by: SURGERY

## 2022-06-01 PROCEDURE — 700101 HCHG RX REV CODE 250: Performed by: SURGERY

## 2022-06-01 PROCEDURE — 85027 COMPLETE CBC AUTOMATED: CPT

## 2022-06-01 PROCEDURE — 700102 HCHG RX REV CODE 250 W/ 637 OVERRIDE(OP): Performed by: SURGERY

## 2022-06-01 PROCEDURE — 770001 HCHG ROOM/CARE - MED/SURG/GYN PRIV*

## 2022-06-01 PROCEDURE — 80048 BASIC METABOLIC PNL TOTAL CA: CPT

## 2022-06-01 PROCEDURE — A9270 NON-COVERED ITEM OR SERVICE: HCPCS | Performed by: SURGERY

## 2022-06-01 PROCEDURE — 36415 COLL VENOUS BLD VENIPUNCTURE: CPT

## 2022-06-01 RX ORDER — DEXTROSE MONOHYDRATE, SODIUM CHLORIDE, AND POTASSIUM CHLORIDE 50; 1.49; 9 G/1000ML; G/1000ML; G/1000ML
INJECTION, SOLUTION INTRAVENOUS CONTINUOUS
Status: DISCONTINUED | OUTPATIENT
Start: 2022-06-01 | End: 2022-06-02 | Stop reason: HOSPADM

## 2022-06-01 RX ADMIN — KETOROLAC TROMETHAMINE 30 MG: 30 INJECTION, SOLUTION INTRAMUSCULAR; INTRAVENOUS at 12:12

## 2022-06-01 RX ADMIN — ACETAMINOPHEN 1000 MG: 500 TABLET ORAL at 00:25

## 2022-06-01 RX ADMIN — HYDROMORPHONE HYDROCHLORIDE 2 MG: 2 TABLET ORAL at 09:50

## 2022-06-01 RX ADMIN — KETOROLAC TROMETHAMINE 30 MG: 30 INJECTION, SOLUTION INTRAMUSCULAR; INTRAVENOUS at 05:00

## 2022-06-01 RX ADMIN — DEXTROSE, SODIUM CHLORIDE, AND POTASSIUM CHLORIDE: 5; .9; .15 INJECTION INTRAVENOUS at 15:04

## 2022-06-01 RX ADMIN — ACETAMINOPHEN 1000 MG: 500 TABLET ORAL at 05:00

## 2022-06-01 RX ADMIN — ACETAMINOPHEN 1000 MG: 500 TABLET ORAL at 12:13

## 2022-06-01 RX ADMIN — HYDROMORPHONE HYDROCHLORIDE 2 MG: 2 TABLET ORAL at 20:11

## 2022-06-01 RX ADMIN — KETOROLAC TROMETHAMINE 30 MG: 30 INJECTION, SOLUTION INTRAMUSCULAR; INTRAVENOUS at 17:37

## 2022-06-01 RX ADMIN — KETOROLAC TROMETHAMINE 30 MG: 30 INJECTION, SOLUTION INTRAMUSCULAR; INTRAVENOUS at 00:25

## 2022-06-01 RX ADMIN — ENOXAPARIN SODIUM 40 MG: 40 INJECTION SUBCUTANEOUS at 17:37

## 2022-06-01 RX ADMIN — ACETAMINOPHEN 1000 MG: 500 TABLET ORAL at 17:37

## 2022-06-01 ASSESSMENT — PAIN DESCRIPTION - PAIN TYPE
TYPE: ACUTE PAIN;SURGICAL PAIN

## 2022-06-01 NOTE — PROGRESS NOTES
Received report from previous shift RN  Assessment complete.  A&O x4, drowsy. Patient calls appropriately.  Patient ambulates with x1 assist. Bed alarm on.   Patient has 0/10 pain. Pain managed with prescribed medications as needed.  Denies N&V. Tolerating regular diet.  Surgical incisions c/d/i, closed with dermabond. Previous RLQ ileostomy. RUQ NETTA with sanguineous output.  + void, + flatus, - BM.  Patient denies SOB.  SCD's on.  Patient comfortable in bed, finishing dinner. Drowsy at this time.  Review plan with of care with patient. Call light and personal belongings with in reach. Hourly rounding in place. All needs met at this time.

## 2022-06-01 NOTE — DISCHARGE PLANNING
Case Management Discharge Planning    Admission Date: 5/31/2022  GMLOS: 3.7  ALOS: 1    6-Clicks ADL Score: 24  6-Clicks Mobility Score: 20      Anticipated Discharge Dispo:  Home    DME Needed: No    Action(s) Taken: Spoke with patient who stated that he lives alone. He does not have any family that lives close by. He was independent prior to admission and does not use any equipment.    Escalations Completed: None    Medically Clear: No    Next Steps: Continue to follow for emerging discharge needs    Barriers to Discharge: None    Is the patient up for discharge tomorrow: TBD

## 2022-06-01 NOTE — CARE PLAN
The patient is Stable - Low risk of patient condition declining or worsening    Shift Goals  Clinical Goals: Safety, mobilization, I&O  Patient Goals: Mobility, comfort  Family Goals: N/A    Progress made toward(s) clinical / shift goals:  pt pain controlled well by prescribed medications. Verbalizing plan of care and understanding updates    Patient is not progressing towards the following goals:

## 2022-06-01 NOTE — PROGRESS NOTES
"6/1  S:  51 y.o.male s/p Diag Laparoscopy, ALEX  POD# 1.  Patient doing well overnight, still with some abdominal pain, but tolerating PO and ileostomy functioning.  Ambulating as well and pain is currently well controlled,  Discussed results of surgery and findings of a frozen pelvis.      O:  /66   Pulse 70   Temp 36.6 °C (97.8 °F) (Temporal)   Resp 18   Ht 1.88 m (6' 2\")   Wt 90.3 kg (199 lb 1.2 oz)   SpO2 92%   I/O last 3 completed shifts:  In: 830 [P.O.:480; I.V.:350]  Out: 960 [Urine:760; Drains:180]  Recent Labs     05/31/22  0835 06/01/22  0617   SODIUM 130* 129*   POTASSIUM 3.4* 4.4   CHLORIDE 93* 94*   CO2 21 21   GLUCOSE 95 126*   BUN 9 20   CREATININE 0.92 1.28   CALCIUM 9.9 8.7     Recent Labs     05/31/22  0835 06/01/22  0617   WBC 7.1 15.7*   RBC 5.65 5.16   HEMOGLOBIN 17.4 15.9   HEMATOCRIT 47.1 44.6   MCV 83.4 86.4   MCH 30.8 30.8   MCHC 36.9* 35.7*   RDW 40.4 41.8   PLATELETCT 267 265   MPV 8.9* 9.3       Alert and Oriented x3, No Acute Distress  Normal Respiratory Effort  Abdomen soft, appropriately tender  Incisions/Bandages clean/dry/intact  Extremities warm and well perfused  Ostomy pink and healthy, output liquid stool  NETTA Output Serosanguinous    A/P:  Pain control with PO meds  Diet as tolerated  Fluids low volume maintence  Ambulate tid and ad raad  Home when tolerating PO, ambulating, and pain better controlled.    "

## 2022-06-01 NOTE — OP REPORT
DATE OF SERVICE:  05/31/2022     PREOPERATIVE DIAGNOSIS:  Chronic pelvic abscess.     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     PROCEDURES:  1.  Diagnostic laparoscopy.  2.  Lysis of adhesions.     SURGEON:  Derrek Garcia MD     ASSISTANT:  LYNDSEY Wade     ANESTHESIA:  General endotracheal anesthesia.     ANESTHESIOLOGIST:  Isaiah Mireles MD     ESTIMATED BLOOD LOSS:  50 mL.     SPECIMENS:  Peritoneal mass.     COMPLICATIONS:  None.     CONDITION:  Stable.     INDICATIONS FOR PROCEDURE:  This is a 51-year-old male who has a history of   APR and total colectomy for colon and rectal cancers.  He has an end ileostomy   currently in place, which is functioning well.  He was admitted about a month   and half ago with a presacral fluid collection thought to be consistent with   a pelvic abscess.  IR drain was placed and this appeared to be draining some   succuss, the drain fell out, but he was maintained on antibiotics until this   time. Plan was for a presacral drainage procedure. Risks, benefits and   alternatives of the planned procedure were explained to him before proceeding   and he completed a bowel prep.     OPERATIVE FINDINGS:  Extensive abdominal adhesions with particularly dense   pelvic adhesions amounting to a frozen pelvis, unable to access the presacral   space from this direction without extensive small bowel injury.  Procedure   aborted. A 19-Armenian round drain left in the proximal pelvis. A small round   peritoneal mass found and removed with hemostasis.     OPERATIVE TECHNIQUE:  After informed consent was obtained, the patient was   taken to the operating room and placed in supine position.  After adequate   endotracheal anesthesia was achieved, he was switched to lithotomy position   and the abdomen was prepped and draped in sterile fashion.  Operation was   begun by making an 8 mm left upper quadrant incision through which an 8  mm   trocar was introduced into the abdomen using Optiview technique.  After   pneumoperitoneum was achieved, we noted extensive omental adhesions.  We   placed a 5 and 8 mm port in the left flank and began to work through the   adhesions with cautery.  We were able to take most of these down and placed   two more 8 mm robotic ports and an additional 5 mm assist port in the usual   positions.  We then positioned the patient and docked the da Guzman Xi robotic   system for pelvic approach.     We carried out an extensive enterolysis for about an hour including multiple   deserosalizations all of which were repaired with interrupted 3-0 Vicryl SH   Lembert sutures.  We encountered particularly dense adhesions in the proximal   pelvis and leading down toward the pelvis the adhesions froze into a solid   mass. We felt at this point that it was not safe to proceed and that we would   not be able to reach the deep pelvic presacral spaces without extensive   irreversible small bowel injury secondary to the frozen pelvis.  Therefore, we   decided to leave our deserosalization repairs in place, placed a 19-Tajik   round drain and abort the procedure at this time.  Of note, clinically the   patient had been improving greatly with antibiotics and we can reevaluate the   abscess at a later time.     We irrigated the operative field.  During the dissection, we did find a round   3x2 cm ovoid mass in the anterior peritoneum consistent with possible old   necrotic omentum.  This was lysed from the surrounding adhesions and removed   with an EndoCatch bag.  We then placed a 19-Tajik round drain through one of   the trocar sites into the proximal pelvis, which was tied to the skin with a   2-0 nylon and hooked to bulb suction.  Pneumoperitoneum was reduced and all   trocars were removed.  Skin incisions were closed with 4-0 Monocryl and   Dermabond.  The drain was sewn to the skin with a 2-0 nylon and hooked to bulb   suction at  the end of the case.  The patient was returned to the PACU in   stable condition.  All instrument counts were correct at the end of procedure.  Case require an assistant due to the complexity of the procedure.  The assistant helped with trocar placement, camera operation, robotic operation, hemostatic maneuvers and wound closures.        ______________________________  MD SHASTA Rodas/JOANNE/ECHO    DD:  05/31/2022 14:43  DT:  05/31/2022 17:21    Job#:  [id number]

## 2022-06-01 NOTE — PROGRESS NOTES
Received report from night shift RN. Assumed patient care.    Patient is A+O x4.  Tolerating regular diet. Denies N/V.  Denies chest pain or SOB.  Pain 5 on a 0-10 pain scale.   + void, output per RLQ ileostomy  Patient ambulates stand by assist.     Plan of care discussed, all questions answered. Educated on the importance of calling before getting OOB and pt verbalizes understanding. Educated regarding importance of oral care. Oral care kit at bedside. Call light is within reach, treaded slipper socks on, bed in lowest/ locked position, hourly rounding in place, all needs met at this time

## 2022-06-01 NOTE — CARE PLAN
The patient is Stable - Low risk of patient condition declining or worsening    Shift Goals  Clinical Goals: Pain control, OOB activity  Patient Goals: Pain control, rest  Family Goals: No family present    Problem: Pain - Standard  Goal: Alleviation of pain or a reduction in pain to the patient’s comfort goal  Outcome: Progressing     Problem: Knowledge Deficit - Standard  Goal: Patient and family/care givers will demonstrate understanding of plan of care, disease process/condition, diagnostic tests and medications  Outcome: Progressing     Progress made toward(s) clinical / shift goals:      Patient updated on current plan of care and verbalizes understanding.   Pain is controlled with current medications per MAR.     Patient is not progressing towards the following goals:

## 2022-06-02 VITALS
DIASTOLIC BLOOD PRESSURE: 66 MMHG | TEMPERATURE: 97.3 F | OXYGEN SATURATION: 92 % | RESPIRATION RATE: 18 BRPM | HEART RATE: 68 BPM | WEIGHT: 199.08 LBS | BODY MASS INDEX: 25.55 KG/M2 | SYSTOLIC BLOOD PRESSURE: 103 MMHG | HEIGHT: 74 IN

## 2022-06-02 LAB
ANION GAP SERPL CALC-SCNC: 10 MMOL/L (ref 7–16)
BUN SERPL-MCNC: 17 MG/DL (ref 8–22)
CALCIUM SERPL-MCNC: 8.8 MG/DL (ref 8.5–10.5)
CHLORIDE SERPL-SCNC: 101 MMOL/L (ref 96–112)
CO2 SERPL-SCNC: 24 MMOL/L (ref 20–33)
CREAT SERPL-MCNC: 1 MG/DL (ref 0.5–1.4)
ERYTHROCYTE [DISTWIDTH] IN BLOOD BY AUTOMATED COUNT: 44.1 FL (ref 35.9–50)
GFR SERPLBLD CREATININE-BSD FMLA CKD-EPI: 91 ML/MIN/1.73 M 2
GLUCOSE SERPL-MCNC: 101 MG/DL (ref 65–99)
HCT VFR BLD AUTO: 43.5 % (ref 42–52)
HGB BLD-MCNC: 15.2 G/DL (ref 14–18)
MCH RBC QN AUTO: 30.7 PG (ref 27–33)
MCHC RBC AUTO-ENTMCNC: 34.9 G/DL (ref 33.7–35.3)
MCV RBC AUTO: 87.9 FL (ref 81.4–97.8)
PLATELET # BLD AUTO: 230 K/UL (ref 164–446)
PMV BLD AUTO: 9.4 FL (ref 9–12.9)
POTASSIUM SERPL-SCNC: 4.3 MMOL/L (ref 3.6–5.5)
RBC # BLD AUTO: 4.95 M/UL (ref 4.7–6.1)
SODIUM SERPL-SCNC: 135 MMOL/L (ref 135–145)
WBC # BLD AUTO: 9.9 K/UL (ref 4.8–10.8)

## 2022-06-02 PROCEDURE — 85027 COMPLETE CBC AUTOMATED: CPT

## 2022-06-02 PROCEDURE — 36415 COLL VENOUS BLD VENIPUNCTURE: CPT

## 2022-06-02 PROCEDURE — A9270 NON-COVERED ITEM OR SERVICE: HCPCS | Performed by: SURGERY

## 2022-06-02 PROCEDURE — 80048 BASIC METABOLIC PNL TOTAL CA: CPT

## 2022-06-02 PROCEDURE — 700111 HCHG RX REV CODE 636 W/ 250 OVERRIDE (IP): Performed by: SURGERY

## 2022-06-02 PROCEDURE — 700102 HCHG RX REV CODE 250 W/ 637 OVERRIDE(OP): Performed by: SURGERY

## 2022-06-02 RX ORDER — HYDROMORPHONE HYDROCHLORIDE 2 MG/1
2-4 TABLET ORAL EVERY 4 HOURS PRN
Qty: 20 TABLET | Refills: 0 | Status: SHIPPED | OUTPATIENT
Start: 2022-06-02 | End: 2022-06-09

## 2022-06-02 RX ORDER — HYDROMORPHONE HYDROCHLORIDE 2 MG/1
2-4 TABLET ORAL EVERY 4 HOURS PRN
Qty: 20 TABLET | Refills: 0 | Status: SHIPPED | OUTPATIENT
Start: 2022-06-02 | End: 2022-06-02 | Stop reason: SDUPTHER

## 2022-06-02 RX ADMIN — KETOROLAC TROMETHAMINE 30 MG: 30 INJECTION, SOLUTION INTRAMUSCULAR; INTRAVENOUS at 11:34

## 2022-06-02 RX ADMIN — KETOROLAC TROMETHAMINE 30 MG: 30 INJECTION, SOLUTION INTRAMUSCULAR; INTRAVENOUS at 03:42

## 2022-06-02 RX ADMIN — ACETAMINOPHEN 1000 MG: 500 TABLET ORAL at 03:43

## 2022-06-02 RX ADMIN — ACETAMINOPHEN 1000 MG: 500 TABLET ORAL at 11:33

## 2022-06-02 ASSESSMENT — PAIN DESCRIPTION - PAIN TYPE: TYPE: ACUTE PAIN

## 2022-06-02 NOTE — PROGRESS NOTES
Received report from night shift RN. Assumed patient care.    Patient is A+O x4.  Tolerating regular diet. Denies N/V.  Denies chest pain or SOB.  Patient sleeping.  + void, + BM. Last BM 6/1.  Patient ambulates stand by assist.     Plan of care discussed, all questions answered. Educated on the importance of calling before getting OOB and pt verbalizes understanding. Educated regarding importance of oral care. Oral care kit at bedside. Call light is within reach, treaded slipper socks on, bed in lowest/ locked position, hourly rounding in place, all needs met at this time

## 2022-06-02 NOTE — PROGRESS NOTES
Patient discharged to home. Patient leaving by taxi. IV discontinued. Prescription information given to patient, verbalized understanding, consent signed and in chart. Discharge instructions given regarding drain care, follow-up appointments, and signs and symptoms of infection.  Education provided, patient asked questions and verbalized understanding. Discharge paperwork signed and in chart. Patient is tolerating diet, stable when ambulating, and pain is well controlled. All belongings collected. No further questions or concerns at this time.

## 2022-06-02 NOTE — DISCHARGE INSTRUCTIONS
Discharge Instructions    Discharged to home by car with relative. Discharged via wheelchair, hospital escort: Yes.  Special equipment needed: Not Applicable    Be sure to schedule a follow-up appointment with your primary care doctor or any specialists as instructed.     Discharge Plan:   Diet Plan: Discussed  Activity Level: Discussed  Confirmed Follow up Appointment: Patient to Call and Schedule Appointment  Confirmed Symptoms Management: Discussed  Medication Reconciliation Updated: Yes    I understand that a diet low in cholesterol, fat, and sodium is recommended for good health. Unless I have been given specific instructions below for another diet, I accept this instruction as my diet prescription.   Other diet: Regular    Special Instructions: None    Is patient discharged on Warfarin / Coumadin?   No     Surgical Drain Home Care  Surgical drains are used to remove extra fluid that normally builds up in a surgical wound after surgery. A surgical drain helps to heal a surgical wound. Different kinds of surgical drains include:  Active drains. These drains use suction to pull drainage away from the surgical wound. Drainage flows through a tube to a container outside of the body. With these drains, you need to keep the bulb or the drainage container flat (compressed) at all times, except while you empty it. Flattening the bulb or container creates suction.  Passive drains. These drains allow fluid to drain naturally, by gravity. Drainage flows through a tube to a bandage (dressing) or a container outside of the body. Passive drains do not need to be emptied.  A drain is placed during surgery. Right after surgery, drainage is usually bright red and a little thicker than water. The drainage may gradually turn yellow or pink and become thinner. It is likely that your health care provider will remove the drain when the drainage stops or when the amount decreases to 1-2 Tbsp (15-30 mL) during a 24-hour  period.  Supplies needed:  Tape.  Germ-free cleaning solution (sterile saline).  Cotton swabs.  Split gauze drain sponge: 4 x 4 inches (10 x 10 cm).  Gauze square: 4 x 4 inches (10 x 10 cm).  How to care for your surgical drain  Care for your drain as told by your health care provider. This is important to help prevent infection. If your drain is placed at your back, or any other hard-to-reach area, ask another person to assist you in performing the following tasks:  General care  Keep the skin around the drain dry and covered with a dressing at all times.  Check your drain area every day for signs of infection. Check for:  Redness, swelling, or pain.  Pus or a bad smell.  Cloudy drainage.  Tenderness or pressure at the drain exit site.  Changing the dressing  Follow instructions from your health care provider about how to change your dressing. Change your dressing at least once a day. Change it more often if needed to keep the dressing dry. Make sure you:  Gather your supplies.  Wash your hands with soap and water before you change your dressing. If soap and water are not available, use hand .  Remove the old dressing. Avoid using scissors to do that.  Wash your hands with soap and water again after removing the old dressing.  Use sterile saline to clean your skin around the drain. You may need to use a cotton swab to clean the skin.  Place the tube through the slit in a drain sponge. Place the drain sponge so that it covers your wound.  Place the gauze square or another drain sponge on top of the drain sponge that is on the wound. Make sure the tube is between those layers.  Tape the dressing to your skin.  Tape the drainage tube to your skin 1-2 inches (2.5-5 cm) below the place where the tube enters your body. Taping keeps the tube from pulling on any stitches (sutures) that you have.  Wash your hands with soap and water.  Write down the color of your drainage and how often you change your  dressing.  How to empty your active drain    Make sure that you have a measuring cup that you can empty your drainage into.  Wash your hands with soap and water. If soap and water are not available, use hand .  Loosen any pins or clips that hold the tube in place.  If your health care provider tells you to strip the tube to prevent clots and tube blockages:  Hold the tube at the skin with one hand. Use your other hand to pinch the tubing with your thumb and first finger.  Gently move your fingers down the tube while squeezing very lightly. This clears any drainage, clots, or tissue from the tube.  You may need to do this several times each day to keep the tube clear. Do not pull on the tube.  Open the bulb cap or the drain plug. Do not touch the inside of the cap or the bottom of the plug.  Turn the device upside down and gently squeeze.  Empty all of the drainage into the measuring cup.  Compress the bulb or the container and replace the cap or the plug. To compress the bulb or the container, squeeze it firmly in the middle while you close the cap or plug the container.  Write down the amount of drainage that you have in each 24-hour period. If you have less than 2 Tbsp (30 mL) of drainage during 24 hours, contact your health care provider.  Flush the drainage down the toilet.  Wash your hands with soap and water.  Contact a health care provider if:  You have redness, swelling, or pain around your drain area.  You have pus or a bad smell coming from your drain area.  You have a fever or chills.  The skin around your drain is warm to the touch.  The amount of drainage that you have is increasing instead of decreasing.  You have drainage that is cloudy.  There is a sudden stop or a sudden decrease in the amount of drainage that you have.  Your drain tube falls out.  Your active drain does not stay compressed after you empty it.  Summary  Surgical drains are used to remove extra fluid that normally builds up  in a surgical wound after surgery.  Different kinds of surgical drains include active drains and passive drains. Active drains use suction to pull drainage away from the surgical wound, and passive drains allow fluid to drain naturally.  It is important to care for your drain to prevent infection. If your drain is placed at your back, or any other hard-to-reach area, ask another person to assist you.  Contact your health care provider if you have redness, swelling, or pain around your drain area.  This information is not intended to replace advice given to you by your health care provider. Make sure you discuss any questions you have with your health care provider.  Document Released: 12/15/2001 Document Revised: 01/22/2020 Document Reviewed: 01/22/2020  [employer] Patient Education © 2020 [employer]    Incision Care, Adult  An incision is a cut that a doctor makes in your skin for surgery (for a procedure). Most times, these cuts are closed after surgery. Your cut from surgery may be closed with stitches (sutures), staples, skin glue, or skin tape (adhesive strips). You may need to return to your doctor to have stitches or staples taken out. This may happen many days or many weeks after your surgery. The cut needs to be well cared for so it does not get infected.  How to care for your cut  Cut care    Follow instructions from your doctor about how to take care of your cut. Make sure you:  Wash your hands with soap and water before you change your bandage (dressing). If you cannot use soap and water, use hand .  Change your bandage as told by your doctor.  Leave stitches, skin glue, or skin tape in place. They may need to stay in place for 2 weeks or longer. If tape strips get loose and curl up, you may trim the loose edges. Do not remove tape strips completely unless your doctor says it is okay.  Check your cut area every day for signs of infection. Check for:  More redness, swelling, or pain.  More fluid  or blood.  Warmth.  Pus or a bad smell.  Ask your doctor how to clean the cut. This may include:  Using mild soap and water.  Using a clean towel to pat the cut dry after you clean it.  Putting a cream or ointment on the cut. Do this only as told by your doctor.  Covering the cut with a clean bandage.  Ask your doctor when you can leave the cut uncovered.  Do not take baths, swim, or use a hot tub until your doctor says it is okay. Ask your doctor if you can take showers. You may only be allowed to take sponge baths for bathing.  Medicines  If you were prescribed an antibiotic medicine, cream, or ointment, take the antibiotic or put it on the cut as told by your doctor. Do not stop taking or putting on the antibiotic even if your condition gets better.  Take over-the-counter and prescription medicines only as told by your doctor.  General instructions  Limit movement around your cut. This helps healing.  Avoid straining, lifting, or exercise for the first month, or for as long as told by your doctor.  Follow instructions from your doctor about going back to your normal activities.  Ask your doctor what activities are safe.  Protect your cut from the sun when you are outside for the first 6 months, or for as long as told by your doctor. Put on sunscreen around the scar or cover up the scar.  Keep all follow-up visits as told by your doctor. This is important.  Contact a doctor if:  Your have more redness, swelling, or pain around the cut.  You have more fluid or blood coming from the cut.  Your cut feels warm to the touch.  You have pus or a bad smell coming from the cut.  You have a fever or shaking chills.  You feel sick to your stomach (nauseous) or you throw up (vomit).  You are dizzy.  Your stitches or staples come undone.  Get help right away if:  You have a red streak coming from your cut.  Your cut bleeds through the bandage and the bleeding does not stop with gentle pressure.  The edges of your cut open up  and separate.  You have very bad (severe) pain.  You have a rash.  You are confused.  You pass out (faint).  You have trouble breathing and you have a fast heartbeat.  This information is not intended to replace advice given to you by your health care provider. Make sure you discuss any questions you have with your health care provider.  Document Released: 03/11/2013 Document Revised: 05/07/2018 Document Reviewed: 08/25/2017  [employer] Patient Education © 2020 [employer]    Depression / Suicide Risk    As you are discharged from this Critical access hospital facility, it is important to learn how to keep safe from harming yourself.    Recognize the warning signs:  Abrupt changes in personality, positive or negative- including increase in energy   Giving away possessions  Change in eating patterns- significant weight changes-  positive or negative  Change in sleeping patterns- unable to sleep or sleeping all the time   Unwillingness or inability to communicate  Depression  Unusual sadness, discouragement and loneliness  Talk of wanting to die  Neglect of personal appearance   Rebelliousness- reckless behavior  Withdrawal from people/activities they love  Confusion- inability to concentrate     If you or a loved one observes any of these behaviors or has concerns about self-harm, here's what you can do:  Talk about it- your feelings and reasons for harming yourself  Remove any means that you might use to hurt yourself (examples: pills, rope, extension cords, firearm)  Get professional help from the community (Mental Health, Substance Abuse, psychological counseling)  Do not be alone:Call your Safe Contact- someone whom you trust who will be there for you.  Call your local CRISIS HOTLINE 535-4048 or 500-381-9829  Call your local Children's Mobile Crisis Response Team [place] (262) 809-9379 or www.Perfect Escapes.Exploredge  Call the toll free National Suicide Prevention Hotlines   National Suicide Prevention Lifeline 119-630-NMOY  (4102)  [facility] 800-SUICIDE (701-4748)      Diet as tolerated  May shower daily with wounds and drain uncovered  No heavy lifting or straining for 6 weeks after surgery  Follow up next week to have drain removed

## 2022-06-02 NOTE — DISCHARGE SUMMARY
Discharge Summary      DATE OF ADMISSION: 5/31/2022    DATE OF DISCHARGE: 6/2/22    ADMISSION DIAGNOSIS (ES):  ? Pelvic Abscess    DISCHARGE DIAGNOSIS (ES):  ? same    DISCHARGE CONDITION:  ? stable    CONSULTATIONS:  ? none    PROCEDURES:  ? Diagnostic Laparoscopy, Lysis of Adhesions    BRIEF HPI:  ? 51y M with chat appears to be a chronic fluid collection in the pelvis, attempt was to be made at robotic drainage.    HOSPITAL COURSE:  ? Underwent above procedure during which pt was unfortunately found to have a frozen pelvis.  Procedure aborted and recovered post-op.  He had prompt return of bowel function through his ileostomy and was discharged home on POD 2.  NETTA drain was left in place for high output and will need to be removed next week.      MEDS:   Current Outpatient Medications   Medication Sig Dispense Refill   • HYDROmorphone (DILAUDID) 2 MG Tab Take 1-2 Tablets by mouth every four hours as needed for Severe Pain for up to 7 days. 20 Tablet 0       FOLLOW-UP:  ? Please call my office at [phone] to make an appointment in 1 weeks    DISCHARGE INSTRUCTIONS:

## 2022-06-02 NOTE — PROGRESS NOTES
Received report from previous shift RN  Assessment complete.  A&O x 4. Patient calls appropriately.  Patient ambulates with max assist. Bed alarm off, refused.   Patient has 8/10 pain consistently. Pain managed with prescribed medications.  Denies N&V. Tolerating CHO diet for glucose control.  Prior CT dressings c/d/i. R Foot soaked q shift.  + void condom cath, + flatus, - BM.  Patient denies SOB.  SCD's refused.  Patient guests at bedside. Updated on visiting hours.  Review plan with of care with patient. Call light and personal belongings with in reach. Hourly rounding in place. All needs met at this time.

## 2022-06-02 NOTE — CARE PLAN
The patient is Stable - Low risk of patient condition declining or worsening    Shift Goals  Clinical Goals: Pain control, OOB activity, monitor drain output  Patient Goals: Pain control, discharge  Family Goals: No family present    Problem: Pain - Standard  Goal: Alleviation of pain or a reduction in pain to the patient’s comfort goal  Outcome: Progressing     Problem: Knowledge Deficit - Standard  Goal: Patient and family/care givers will demonstrate understanding of plan of care, disease process/condition, diagnostic tests and medications  Outcome: Progressing     Progress made toward(s) clinical / shift goals:      Patient updated on current plan of care and verbalizes understanding.   Pain is controlled with current medications per MAR.     Patient is not progressing towards the following goals:

## 2022-09-01 NOTE — PROGRESS NOTES
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    -No surgical intervention, ok to DC from surgical standpoint, will need to follow up with   -Continue IV antibiotic recommendations per ID      REVIEW OF SYSTEMS:  Review of Systems   Constitutional: Negative.    Eyes: Negative.  Negative for blurred vision and double vision.   Respiratory: Negative.    Cardiovascular: Negative.    Gastrointestinal: Negative.    Genitourinary: Negative.    Musculoskeletal: Negative.    Neurological: Negative.  Negative for dizziness and focal weakness.   Psychiatric/Behavioral: Negative.    All other systems reviewed and are negative.      PHYSICAL EXAMINATION:  Vital Signs: /69   Pulse 68   Temp 36.5 °C (97.7 °F) (Temporal)   Resp 17   Ht 1.88 m (6' 2.02\")   Wt 88.8 kg (195 lb 12.3 oz)   SpO2 97%   Physical Exam  Vitals and nursing note reviewed.   Constitutional:       Appearance: Normal appearance.   Abdominal:      General: Abdomen is flat. Bowel sounds are normal.      Palpations: Abdomen is soft.   Neurological:      Mental Status: He is alert.         LABORATORY VALUES:   Recent Labs     03/29/22  0903 03/30/22  0436 03/31/22  0317   WBC 5.0 5.2 5.3   RBC 4.19* 4.17* 4.31*   HEMOGLOBIN 13.2* 12.9* 13.3*   HEMATOCRIT 38.5* 38.9* 39.3*   MCV 91.9 93.3 91.2   MCH 31.5 30.9 30.9   MCHC 34.3 33.2* 33.8   RDW 44.5 44.9 43.5   PLATELETCT 318 312 298   MPV 9.1 9.0 9.1     Recent Labs     03/29/22  0903 03/30/22  0436 03/31/22  0317   SODIUM 137 138 137   POTASSIUM 4.0 3.8 4.0   CHLORIDE 104 106 106   CO2 22 23 19*   GLUCOSE 136* 100* 87   BUN 7* 8 6*   CREATININE 1.06 0.92 0.84   CALCIUM 8.5 8.3* 8.7                IMAGING:   [id number]   Final Result      1.  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      [id number] VIEW   Final Result      1.  Pigtail drainage catheter " in place within the pelvis.   2.  Nonobstructive bowel gas pattern.      CT-PELVIS WITH   Final Result      1.  Right lower quadrant ileostomy.   2.  Left transgluteal posterior pelvic/presacral pigtail drainage catheter remains in place. No residual fluid collection evident about the pigtail loop.   3.  Enteric contrast opacifying pelvic small bowel loops. No appreciable communication of contrast into the presacral collection.   4.  Catheter abscessogram may be of interest if there is strong clinical suspicion for a fistula.      CT-ABDOMEN-PELVIS WITH   Final Result         1. No contrast is seen within the presacral fluid collection to suggest fistulous connection.      CT-ABDOMEN&PELVIS ENTEROGRAPHY   Final Result      1.  Unchanged presacral/precoccygeal space fluid collection measuring 2.9 x 2.2 cm consistent with abscess      2.  Prior colectomy and there is a right lower quadrant ostomy      3.  No direct evidence for fistula although cannot be excluded with the negative contrast administered for enterography. Consider repeat CT scan with standard oral contrast when clinically appropriate      4.  Postoperative changes in the presacral/precoccygeal space      5.  Mass contiguous with the inferior aspect of rectal stump which could be tumor versus enlarged prostate.      6.  No other significant finding      CT-DRAIN-PERITONEAL   Final Result      1.  CT GUIDED RETROPERITONEAL PELVIC/PRESACRAL CATHETER DRAINAGE. INITIAL FLUID RETURN RESEMBLED ENTERIC CONTENTS. A FISTULOUS LEAK MAY BE PRESENT. LIKEWISE, THE AIR CONTENT SUGGESTS THE POSSIBILITY OF COMMUNICATION WITH BOWEL.   2.  THE CURRENT PLAN IS TO MONITOR DRAINAGE OUTPUT, IRRIGATE DAILY AND OBTAIN A FOLLOWUP CT SCAN IN 5-7 DAYS IF CLINICALLY INDICATED.   3. ABSCESSOGRAM MAY BE OF INTEREST PRIOR TO CATHETER REMOVAL TO DETERMINE IF THERE IS FISTULOUS COMMUNICATION WITH SMALL BOWEL IN THE PELVIS.      OUTSIDE IMAGES-CT ABDOMEN /PELVIS   Final Result       IR-PICC LINE PLACEMENT W/ GUIDANCE > AGE 5    (Results Pending)         Olivas catheter: No Olivas      DVT Prophylaxis: Enoxaparin (Lovenox)        Assessed for rehab: Patient returned to prior level of function, rehabilitation not indicated at this time      ASSESSMENT AND PLAN:   * Abdominal fluid collection- (present on admission)  Assessment & Plan  Fluid collection in pelvis  IR drain placed 3/22  Bilious drainage  3/24 CT to evaluate for fistula- likely fistula   3/25 NETTA output decreasing  3/27 NETTA output decreasing   3/28 Repeat CT shows no fluid collection near drain  3/29 X-ray abdomen to include drain to determine if fistula is present    SBO (small bowel obstruction) (HCC)- (present on admission)  Assessment & Plan  Medical management   NG for any N/V  Ice chips   3/23 Advance diet as tolerated   3/28 Tolerating regular diet           Discussed patient condition with Patient and general surgery .     Niacinamide Pregnancy And Lactation Text: These medications are considered safe during pregnancy.

## 2024-08-28 ENCOUNTER — HOSPITAL ENCOUNTER (OUTPATIENT)
Dept: RADIOLOGY | Facility: MEDICAL CENTER | Age: 54
End: 2024-08-28
Attending: SURGERY
Payer: COMMERCIAL

## 2024-08-28 DIAGNOSIS — C20 MALIGNANT NEOPLASM OF RECTUM (HCC): ICD-10-CM

## 2024-08-28 PROCEDURE — 700111 HCHG RX REV CODE 636 W/ 250 OVERRIDE (IP): Mod: JZ | Performed by: RADIOLOGY

## 2024-08-28 PROCEDURE — A9579 GAD-BASE MR CONTRAST NOS,1ML: HCPCS | Mod: JZ | Performed by: SURGERY

## 2024-08-28 PROCEDURE — 700117 HCHG RX CONTRAST REV CODE 255: Mod: JZ | Performed by: SURGERY

## 2024-08-28 PROCEDURE — 72197 MRI PELVIS W/O & W/DYE: CPT

## 2024-08-28 RX ADMIN — GLUCAGON 1 MG: 1 INJECTION, POWDER, LYOPHILIZED, FOR SOLUTION INTRAMUSCULAR; INTRAVENOUS at 16:30

## 2024-08-28 RX ADMIN — GADOTERIDOL 20 ML: 279.3 INJECTION, SOLUTION INTRAVENOUS at 17:51

## 2024-12-14 ENCOUNTER — HOSPITAL ENCOUNTER (OUTPATIENT)
Dept: RADIOLOGY | Facility: MEDICAL CENTER | Age: 54
End: 2024-12-14

## 2024-12-14 ENCOUNTER — HOSPITAL ENCOUNTER (INPATIENT)
Facility: MEDICAL CENTER | Age: 54
LOS: 2 days | DRG: 388 | End: 2024-12-16
Attending: HOSPITALIST | Admitting: INTERNAL MEDICINE
Payer: COMMERCIAL

## 2024-12-14 DIAGNOSIS — R18.8 ABDOMINAL FLUID COLLECTION: ICD-10-CM

## 2024-12-14 LAB
ANION GAP SERPL CALC-SCNC: 8 MMOL/L (ref 7–16)
BUN SERPL-MCNC: 14 MG/DL (ref 8–22)
CALCIUM SERPL-MCNC: 8.2 MG/DL (ref 8.5–10.5)
CHLORIDE SERPL-SCNC: 100 MMOL/L (ref 96–112)
CO2 SERPL-SCNC: 29 MMOL/L (ref 20–33)
CREAT SERPL-MCNC: 0.93 MG/DL (ref 0.5–1.4)
ERYTHROCYTE [DISTWIDTH] IN BLOOD BY AUTOMATED COUNT: 46.3 FL (ref 35.9–50)
GFR SERPLBLD CREATININE-BSD FMLA CKD-EPI: 98 ML/MIN/1.73 M 2
GLUCOSE SERPL-MCNC: 95 MG/DL (ref 65–99)
HCT VFR BLD AUTO: 42.5 % (ref 42–52)
HGB BLD-MCNC: 14.4 G/DL (ref 14–18)
MCH RBC QN AUTO: 30.8 PG (ref 27–33)
MCHC RBC AUTO-ENTMCNC: 33.9 G/DL (ref 32.3–36.5)
MCV RBC AUTO: 91 FL (ref 81.4–97.8)
PLATELET # BLD AUTO: 211 K/UL (ref 164–446)
PMV BLD AUTO: 8.6 FL (ref 9–12.9)
POTASSIUM SERPL-SCNC: 3.7 MMOL/L (ref 3.6–5.5)
RBC # BLD AUTO: 4.67 M/UL (ref 4.7–6.1)
SODIUM SERPL-SCNC: 137 MMOL/L (ref 135–145)
WBC # BLD AUTO: 9.2 K/UL (ref 4.8–10.8)

## 2024-12-14 PROCEDURE — 85027 COMPLETE CBC AUTOMATED: CPT

## 2024-12-14 PROCEDURE — 99222 1ST HOSP IP/OBS MODERATE 55: CPT | Performed by: INTERNAL MEDICINE

## 2024-12-14 PROCEDURE — 99358 PROLONG SERVICE W/O CONTACT: CPT | Performed by: INTERNAL MEDICINE

## 2024-12-14 PROCEDURE — 770006 HCHG ROOM/CARE - MED/SURG/GYN SEMI*

## 2024-12-14 PROCEDURE — 80048 BASIC METABOLIC PNL TOTAL CA: CPT

## 2024-12-14 PROCEDURE — 700111 HCHG RX REV CODE 636 W/ 250 OVERRIDE (IP): Performed by: INTERNAL MEDICINE

## 2024-12-14 PROCEDURE — 99222 1ST HOSP IP/OBS MODERATE 55: CPT | Performed by: SURGERY

## 2024-12-14 PROCEDURE — 36415 COLL VENOUS BLD VENIPUNCTURE: CPT

## 2024-12-14 PROCEDURE — 700105 HCHG RX REV CODE 258: Performed by: INTERNAL MEDICINE

## 2024-12-14 RX ORDER — SODIUM CHLORIDE 9 MG/ML
INJECTION, SOLUTION INTRAVENOUS CONTINUOUS
Status: DISCONTINUED | OUTPATIENT
Start: 2024-12-14 | End: 2024-12-15

## 2024-12-14 RX ORDER — ENOXAPARIN SODIUM 100 MG/ML
40 INJECTION SUBCUTANEOUS DAILY
Status: DISCONTINUED | OUTPATIENT
Start: 2024-12-14 | End: 2024-12-16 | Stop reason: HOSPADM

## 2024-12-14 RX ORDER — ACETAMINOPHEN 325 MG/1
650 TABLET ORAL EVERY 6 HOURS PRN
Status: DISCONTINUED | OUTPATIENT
Start: 2024-12-14 | End: 2024-12-16 | Stop reason: HOSPADM

## 2024-12-14 RX ORDER — MORPHINE SULFATE 4 MG/ML
2 INJECTION INTRAVENOUS
Status: DISCONTINUED | OUTPATIENT
Start: 2024-12-14 | End: 2024-12-15

## 2024-12-14 RX ORDER — LEVOFLOXACIN 5 MG/ML
750 INJECTION, SOLUTION INTRAVENOUS EVERY 24 HOURS
Status: DISCONTINUED | OUTPATIENT
Start: 2024-12-15 | End: 2024-12-14

## 2024-12-14 RX ORDER — ONDANSETRON 4 MG/1
4 TABLET, ORALLY DISINTEGRATING ORAL EVERY 4 HOURS PRN
Status: DISCONTINUED | OUTPATIENT
Start: 2024-12-14 | End: 2024-12-16 | Stop reason: HOSPADM

## 2024-12-14 RX ORDER — PROMETHAZINE HYDROCHLORIDE 12.5 MG/1
12.5-25 SUPPOSITORY RECTAL EVERY 4 HOURS PRN
Status: DISCONTINUED | OUTPATIENT
Start: 2024-12-14 | End: 2024-12-16 | Stop reason: HOSPADM

## 2024-12-14 RX ORDER — METRONIDAZOLE 500 MG/100ML
500 INJECTION, SOLUTION INTRAVENOUS EVERY 12 HOURS
Status: DISCONTINUED | OUTPATIENT
Start: 2024-12-14 | End: 2024-12-16

## 2024-12-14 RX ORDER — ONDANSETRON 2 MG/ML
4 INJECTION INTRAMUSCULAR; INTRAVENOUS EVERY 4 HOURS PRN
Status: DISCONTINUED | OUTPATIENT
Start: 2024-12-14 | End: 2024-12-16 | Stop reason: HOSPADM

## 2024-12-14 RX ORDER — PROMETHAZINE HYDROCHLORIDE 25 MG/1
12.5-25 TABLET ORAL EVERY 4 HOURS PRN
Status: DISCONTINUED | OUTPATIENT
Start: 2024-12-14 | End: 2024-12-16 | Stop reason: HOSPADM

## 2024-12-14 RX ORDER — LABETALOL HYDROCHLORIDE 5 MG/ML
10 INJECTION, SOLUTION INTRAVENOUS EVERY 4 HOURS PRN
Status: DISCONTINUED | OUTPATIENT
Start: 2024-12-14 | End: 2024-12-16 | Stop reason: HOSPADM

## 2024-12-14 RX ORDER — PROCHLORPERAZINE EDISYLATE 5 MG/ML
5-10 INJECTION INTRAMUSCULAR; INTRAVENOUS EVERY 4 HOURS PRN
Status: DISCONTINUED | OUTPATIENT
Start: 2024-12-14 | End: 2024-12-16 | Stop reason: HOSPADM

## 2024-12-14 RX ADMIN — ENOXAPARIN SODIUM 40 MG: 100 INJECTION SUBCUTANEOUS at 22:23

## 2024-12-14 RX ADMIN — SODIUM CHLORIDE: 9 INJECTION, SOLUTION INTRAVENOUS at 22:18

## 2024-12-14 RX ADMIN — METRONIDAZOLE 500 MG: 500 INJECTION, SOLUTION INTRAVENOUS at 22:21

## 2024-12-14 RX ADMIN — FAMOTIDINE 20 MG: 10 INJECTION, SOLUTION INTRAVENOUS at 22:23

## 2024-12-14 RX ADMIN — MORPHINE SULFATE 2 MG: 4 INJECTION INTRAVENOUS at 22:30

## 2024-12-14 RX ADMIN — CEFTRIAXONE SODIUM 2000 MG: 10 INJECTION, POWDER, FOR SOLUTION INTRAVENOUS at 23:51

## 2024-12-14 ASSESSMENT — LIFESTYLE VARIABLES
TOTAL SCORE: 0
TOTAL SCORE: 0
ALCOHOL_USE: NO
AVERAGE NUMBER OF DAYS PER WEEK YOU HAVE A DRINK CONTAINING ALCOHOL: 0
TOTAL SCORE: 0
HOW MANY TIMES IN THE PAST YEAR HAVE YOU HAD 5 OR MORE DRINKS IN A DAY: 0
SUBSTANCE_ABUSE: 0
DOES PATIENT WANT TO STOP DRINKING: CANNOT ASSESS
EVER HAD A DRINK FIRST THING IN THE MORNING TO STEADY YOUR NERVES TO GET RID OF A HANGOVER: NO
CONSUMPTION TOTAL: NEGATIVE
ON A TYPICAL DAY WHEN YOU DRINK ALCOHOL HOW MANY DRINKS DO YOU HAVE: 0
HAVE PEOPLE ANNOYED YOU BY CRITICIZING YOUR DRINKING: NO
EVER FELT BAD OR GUILTY ABOUT YOUR DRINKING: NO
HAVE YOU EVER FELT YOU SHOULD CUT DOWN ON YOUR DRINKING: NO

## 2024-12-14 ASSESSMENT — ENCOUNTER SYMPTOMS
ABDOMINAL PAIN: 1
FEVER: 0
SPUTUM PRODUCTION: 0
NERVOUS/ANXIOUS: 0
NAUSEA: 1
HEMOPTYSIS: 0
HALLUCINATIONS: 0
SPEECH CHANGE: 0
TREMORS: 0
CHILLS: 0
FOCAL WEAKNESS: 0
NECK PAIN: 0
HEADACHES: 0
COUGH: 0
PALPITATIONS: 0
ORTHOPNEA: 0
BLURRED VISION: 0
DIARRHEA: 0
POLYDIPSIA: 0
VOMITING: 1
BRUISES/BLEEDS EASILY: 0
DOUBLE VISION: 0
HEARTBURN: 0
FLANK PAIN: 0
PHOTOPHOBIA: 0
BACK PAIN: 0
WEIGHT LOSS: 0

## 2024-12-14 ASSESSMENT — COGNITIVE AND FUNCTIONAL STATUS - GENERAL
DAILY ACTIVITIY SCORE: 24
SUGGESTED CMS G CODE MODIFIER DAILY ACTIVITY: CH
MOBILITY SCORE: 24
SUGGESTED CMS G CODE MODIFIER MOBILITY: CH

## 2024-12-14 ASSESSMENT — SOCIAL DETERMINANTS OF HEALTH (SDOH)
WITHIN THE LAST YEAR, HAVE YOU BEEN KICKED, HIT, SLAPPED, OR OTHERWISE PHYSICALLY HURT BY YOUR PARTNER OR EX-PARTNER?: NO
WITHIN THE LAST YEAR, HAVE YOU BEEN AFRAID OF YOUR PARTNER OR EX-PARTNER?: NO
WITHIN THE LAST YEAR, HAVE TO BEEN RAPED OR FORCED TO HAVE ANY KIND OF SEXUAL ACTIVITY BY YOUR PARTNER OR EX-PARTNER?: NO
WITHIN THE LAST YEAR, HAVE YOU BEEN HUMILIATED OR EMOTIONALLY ABUSED IN OTHER WAYS BY YOUR PARTNER OR EX-PARTNER?: NO

## 2024-12-14 ASSESSMENT — PAIN DESCRIPTION - PAIN TYPE
TYPE: ACUTE PAIN
TYPE: ACUTE PAIN

## 2024-12-14 ASSESSMENT — PATIENT HEALTH QUESTIONNAIRE - PHQ9
SUM OF ALL RESPONSES TO PHQ9 QUESTIONS 1 AND 2: 0
2. FEELING DOWN, DEPRESSED, IRRITABLE, OR HOPELESS: NOT AT ALL
1. LITTLE INTEREST OR PLEASURE IN DOING THINGS: NOT AT ALL

## 2024-12-14 NOTE — PROGRESS NOTES
[facility]  Consult requested by: Dr Plasencia  Reason for consult: Abdominal pain  Is consult for transition of care: Yes  Pertinent history/hospital Course: Patient has a history of small bowel obstruction and now once again has a small bowel obstruction with an abscess on imaging studies.  The patient at this point will need to be transferred to [facility] for evaluation by surgery as well as possible intervention by interventional radiology.  The patient at this point will need antibiotics and pain management as well as fluid resuscitation.  The patient is being sent from [facility]  Code Status: Full code  Can the hospitalist sign off upon completion of required consult/outcomes requested: No   Assigned hospitalist: Please call to [phone] upon arrival after completing MAR and vitals and releasing signed and held orders    For any question or concerns regarding the care of this patient, please reach out to the assigned hospitalist.

## 2024-12-15 PROBLEM — E87.6 HYPOKALEMIA: Status: ACTIVE | Noted: 2024-12-15

## 2024-12-15 PROBLEM — C18.9 COLON CANCER (HCC): Status: ACTIVE | Noted: 2019-07-08

## 2024-12-15 LAB
ALBUMIN SERPL BCP-MCNC: 3.1 G/DL (ref 3.2–4.9)
ALBUMIN/GLOB SERPL: 1.2 G/DL
ALP SERPL-CCNC: 63 U/L (ref 30–99)
ALT SERPL-CCNC: 33 U/L (ref 2–50)
ANION GAP SERPL CALC-SCNC: 10 MMOL/L (ref 7–16)
AST SERPL-CCNC: 27 U/L (ref 12–45)
BASOPHILS # BLD AUTO: 0.3 % (ref 0–1.8)
BASOPHILS # BLD: 0.03 K/UL (ref 0–0.12)
BILIRUB SERPL-MCNC: 0.9 MG/DL (ref 0.1–1.5)
BUN SERPL-MCNC: 13 MG/DL (ref 8–22)
CALCIUM ALBUM COR SERPL-MCNC: 8.6 MG/DL (ref 8.5–10.5)
CALCIUM SERPL-MCNC: 7.9 MG/DL (ref 8.5–10.5)
CHLORIDE SERPL-SCNC: 105 MMOL/L (ref 96–112)
CO2 SERPL-SCNC: 24 MMOL/L (ref 20–33)
CREAT SERPL-MCNC: 0.9 MG/DL (ref 0.5–1.4)
EOSINOPHIL # BLD AUTO: 0.09 K/UL (ref 0–0.51)
EOSINOPHIL NFR BLD: 1 % (ref 0–6.9)
ERYTHROCYTE [DISTWIDTH] IN BLOOD BY AUTOMATED COUNT: 45.4 FL (ref 35.9–50)
GFR SERPLBLD CREATININE-BSD FMLA CKD-EPI: 102 ML/MIN/1.73 M 2
GLOBULIN SER CALC-MCNC: 2.5 G/DL (ref 1.9–3.5)
GLUCOSE SERPL-MCNC: 87 MG/DL (ref 65–99)
HCT VFR BLD AUTO: 37.1 % (ref 42–52)
HGB BLD-MCNC: 13 G/DL (ref 14–18)
IMM GRANULOCYTES # BLD AUTO: 0.04 K/UL (ref 0–0.11)
IMM GRANULOCYTES NFR BLD AUTO: 0.4 % (ref 0–0.9)
LYMPHOCYTES # BLD AUTO: 1.49 K/UL (ref 1–4.8)
LYMPHOCYTES NFR BLD: 16.7 % (ref 22–41)
MAGNESIUM SERPL-MCNC: 1.7 MG/DL (ref 1.5–2.5)
MCH RBC QN AUTO: 31.9 PG (ref 27–33)
MCHC RBC AUTO-ENTMCNC: 35 G/DL (ref 32.3–36.5)
MCV RBC AUTO: 91.2 FL (ref 81.4–97.8)
MONOCYTES # BLD AUTO: 0.79 K/UL (ref 0–0.85)
MONOCYTES NFR BLD AUTO: 8.8 % (ref 0–13.4)
NEUTROPHILS # BLD AUTO: 6.5 K/UL (ref 1.82–7.42)
NEUTROPHILS NFR BLD: 72.8 % (ref 44–72)
NRBC # BLD AUTO: 0 K/UL
NRBC BLD-RTO: 0 /100 WBC (ref 0–0.2)
PLATELET # BLD AUTO: 203 K/UL (ref 164–446)
PMV BLD AUTO: 8.8 FL (ref 9–12.9)
POTASSIUM SERPL-SCNC: 3.3 MMOL/L (ref 3.6–5.5)
PROCALCITONIN SERPL-MCNC: 0.31 NG/ML
PROT SERPL-MCNC: 5.6 G/DL (ref 6–8.2)
RBC # BLD AUTO: 4.07 M/UL (ref 4.7–6.1)
SODIUM SERPL-SCNC: 139 MMOL/L (ref 135–145)
WBC # BLD AUTO: 8.9 K/UL (ref 4.8–10.8)

## 2024-12-15 PROCEDURE — 99232 SBSQ HOSP IP/OBS MODERATE 35: CPT | Performed by: SURGERY

## 2024-12-15 PROCEDURE — 700111 HCHG RX REV CODE 636 W/ 250 OVERRIDE (IP): Mod: JZ | Performed by: INTERNAL MEDICINE

## 2024-12-15 PROCEDURE — 80053 COMPREHEN METABOLIC PANEL: CPT

## 2024-12-15 PROCEDURE — 84145 PROCALCITONIN (PCT): CPT

## 2024-12-15 PROCEDURE — 85025 COMPLETE CBC W/AUTO DIFF WBC: CPT

## 2024-12-15 PROCEDURE — A9270 NON-COVERED ITEM OR SERVICE: HCPCS | Performed by: INTERNAL MEDICINE

## 2024-12-15 PROCEDURE — 99233 SBSQ HOSP IP/OBS HIGH 50: CPT | Performed by: STUDENT IN AN ORGANIZED HEALTH CARE EDUCATION/TRAINING PROGRAM

## 2024-12-15 PROCEDURE — 700102 HCHG RX REV CODE 250 W/ 637 OVERRIDE(OP): Mod: JZ | Performed by: STUDENT IN AN ORGANIZED HEALTH CARE EDUCATION/TRAINING PROGRAM

## 2024-12-15 PROCEDURE — 700102 HCHG RX REV CODE 250 W/ 637 OVERRIDE(OP): Performed by: INTERNAL MEDICINE

## 2024-12-15 PROCEDURE — 770006 HCHG ROOM/CARE - MED/SURG/GYN SEMI*

## 2024-12-15 PROCEDURE — 700111 HCHG RX REV CODE 636 W/ 250 OVERRIDE (IP): Performed by: STUDENT IN AN ORGANIZED HEALTH CARE EDUCATION/TRAINING PROGRAM

## 2024-12-15 PROCEDURE — A9270 NON-COVERED ITEM OR SERVICE: HCPCS | Mod: JZ | Performed by: STUDENT IN AN ORGANIZED HEALTH CARE EDUCATION/TRAINING PROGRAM

## 2024-12-15 PROCEDURE — 700105 HCHG RX REV CODE 258: Performed by: INTERNAL MEDICINE

## 2024-12-15 PROCEDURE — 83735 ASSAY OF MAGNESIUM: CPT

## 2024-12-15 PROCEDURE — 700105 HCHG RX REV CODE 258: Performed by: STUDENT IN AN ORGANIZED HEALTH CARE EDUCATION/TRAINING PROGRAM

## 2024-12-15 RX ORDER — POTASSIUM CHLORIDE 1500 MG/1
40 TABLET, EXTENDED RELEASE ORAL EVERY 4 HOURS
Status: COMPLETED | OUTPATIENT
Start: 2024-12-15 | End: 2024-12-15

## 2024-12-15 RX ORDER — DEXTROSE, SODIUM CHLORIDE, SODIUM LACTATE, POTASSIUM CHLORIDE, AND CALCIUM CHLORIDE 5; .6; .31; .03; .02 G/100ML; G/100ML; G/100ML; G/100ML; G/100ML
INJECTION, SOLUTION INTRAVENOUS CONTINUOUS
Status: DISCONTINUED | OUTPATIENT
Start: 2024-12-15 | End: 2024-12-16

## 2024-12-15 RX ORDER — MORPHINE SULFATE 4 MG/ML
1 INJECTION INTRAVENOUS EVERY 6 HOURS PRN
Status: DISCONTINUED | OUTPATIENT
Start: 2024-12-15 | End: 2024-12-16 | Stop reason: HOSPADM

## 2024-12-15 RX ADMIN — POTASSIUM CHLORIDE 40 MEQ: 1500 TABLET, EXTENDED RELEASE ORAL at 07:59

## 2024-12-15 RX ADMIN — ENOXAPARIN SODIUM 40 MG: 100 INJECTION SUBCUTANEOUS at 16:52

## 2024-12-15 RX ADMIN — POTASSIUM CHLORIDE 40 MEQ: 1500 TABLET, EXTENDED RELEASE ORAL at 10:34

## 2024-12-15 RX ADMIN — MORPHINE SULFATE 1 MG: 4 INJECTION INTRAVENOUS at 16:57

## 2024-12-15 RX ADMIN — FAMOTIDINE 20 MG: 10 INJECTION, SOLUTION INTRAVENOUS at 04:30

## 2024-12-15 RX ADMIN — METRONIDAZOLE 500 MG: 500 INJECTION, SOLUTION INTRAVENOUS at 04:32

## 2024-12-15 RX ADMIN — ACETAMINOPHEN 650 MG: 325 TABLET ORAL at 21:12

## 2024-12-15 RX ADMIN — METRONIDAZOLE 500 MG: 500 INJECTION, SOLUTION INTRAVENOUS at 17:07

## 2024-12-15 RX ADMIN — MORPHINE SULFATE 2 MG: 4 INJECTION INTRAVENOUS at 08:04

## 2024-12-15 RX ADMIN — MORPHINE SULFATE 2 MG: 4 INJECTION INTRAVENOUS at 03:35

## 2024-12-15 RX ADMIN — FAMOTIDINE 20 MG: 10 INJECTION, SOLUTION INTRAVENOUS at 16:54

## 2024-12-15 RX ADMIN — SODIUM CHLORIDE, SODIUM LACTATE, POTASSIUM CHLORIDE, CALCIUM CHLORIDE AND DEXTROSE MONOHYDRATE: 5; 600; 310; 30; 20 INJECTION, SOLUTION INTRAVENOUS at 07:58

## 2024-12-15 RX ADMIN — SODIUM CHLORIDE, SODIUM LACTATE, POTASSIUM CHLORIDE, CALCIUM CHLORIDE AND DEXTROSE MONOHYDRATE: 5; 600; 310; 30; 20 INJECTION, SOLUTION INTRAVENOUS at 22:57

## 2024-12-15 RX ADMIN — CEFTRIAXONE SODIUM 2000 MG: 10 INJECTION, POWDER, FOR SOLUTION INTRAVENOUS at 16:52

## 2024-12-15 ASSESSMENT — PAIN DESCRIPTION - PAIN TYPE
TYPE: ACUTE PAIN

## 2024-12-15 ASSESSMENT — ENCOUNTER SYMPTOMS
NAUSEA: 0
ABDOMINAL PAIN: 0
VOMITING: 0

## 2024-12-15 NOTE — PROGRESS NOTES
"  DATE: 12/15/2024    Hospital Day2  SBO .    INTERVAL EVENTS:  Ostomy working. Feels good. Adv diet and DC if tolerates..    REVIEW OF SYSTEMS:  Comprehensive review of systems is negative with the exception of the aforementioned HPI, PMH, and PSH bullets in accordance with CMS guidelines.    PHYSICAL EXAMINATION:  Vital Signs: /58   Pulse 72   Temp 36.4 °C (97.6 °F) (Temporal)   Resp 16   Ht 1.854 m (6' 1\")   Wt 91.1 kg (200 lb 13.4 oz)   SpO2 92%   Physical Exam  Pulmonary:      Effort: Pulmonary effort is normal.   Abdominal:      General: There is no distension.      Palpations: Abdomen is soft.      Tenderness: There is no abdominal tenderness.      Comments: Ostomy w stool   Skin:     General: Skin is warm.   Neurological:      General: No focal deficit present.      Mental Status: He is alert.         LABORATORY VALUES:  Recent Labs     12/14/24  1847 12/15/24  0348   WBC 9.2 8.9   RBC 4.67* 4.07*   HEMOGLOBIN 14.4 13.0*   HEMATOCRIT 42.5 37.1*   MCV 91.0 91.2   MCH 30.8 31.9   MCHC 33.9 35.0   RDW 46.3 45.4   PLATELETCT 211 203   MPV 8.6* 8.8*     Recent Labs     12/14/24  1847 12/15/24  0348   SODIUM 137 139   POTASSIUM 3.7 3.3*   CHLORIDE 100 105   CO2 29 24   GLUCOSE 95 87   BUN 14 13   CREATININE 0.93 0.90   CALCIUM 8.2* 7.9*     Recent Labs     12/15/24  0348   ASTSGOT 27   ALTSGPT 33   TBILIRUBIN 0.9   ALKPHOSPHAT 63   GLOBULIN 2.5           IMAGING:  CT-OUTSIDE IMAGES-CT ABDOMEN/PELVIS   Final Result          ASSESSMENT AND PLAN:  * SBO (small bowel obstruction) (HCC)- (present on admission)  Assessment & Plan  2 day hx of abd pain  Hx of colon cancer - sp colectomy, colostomy  CT shows SBO and ? Fluid collection  Non op management  12/15 Ostomy functioining - reg diet.             ____________________________________     Edda Parker M.D.    DD: 12/15/2024  12:02 PM   "

## 2024-12-15 NOTE — ASSESSMENT & PLAN NOTE
CT with contrast: A small presacral abscess measuring 3 x 1.6 x 3 cm not excluded  General surgery following  Continue Flagyl and Levaquin  Likely repeat CT abdomen for abscess follow-up

## 2024-12-15 NOTE — PROGRESS NOTES
4 Eyes Skin Assessment Completed by DAVIDSON Lee and DAVIDSON josue.    Head WDL  Ears WDL  Nose WDL  Mouth WDL  Neck WDL  Breast/Chest WDL  Shoulder Blades WDL  Spine scattered scabs  (R) Arm/Elbow/Hand WDL  (L) Arm/Elbow/Hand WDL  Abdomen Scar, colostomy RLQ  Groin WDL  Scrotum/Coccyx/Buttocks WDL  (R) Leg WDL  (L) Leg WDL  (R) Heel/Foot/Toe dry, calloused, 5th toe abrasion  (L) Heel/Foot/Toe dry, calloused              Devices In Places Blood Pressure Cuff      Interventions In Place Pillows and Pressure Redistribution Mattress    Possible Skin Injury No    Pictures Uploaded Into Epic Yes  Wound Consult Placed N/A  RN Wound Prevention Protocol Ordered No

## 2024-12-15 NOTE — PROGRESS NOTES
Patient arrived on unit at 1800. Consent obtained. Orders released and confirmed. Patient oriented to call light and room features. Patient resting comfortably with no complaints of nausea or pain. Patient will require US Guided PIV.

## 2024-12-15 NOTE — ASSESSMENT & PLAN NOTE
colorectal cancer status post bowel resection and colostomy placement in 2022 complicated by small bowel obstruction last 1 a few months ago treated conservatively for  fistula and intra-abdominal abscess      12/15/2024  CT abdomen concerning for small bowel obstruction, small presacral abscess cannot be excluded.  Continue on IV Rocephin, metronidazole  Replace Kcl, keep potassium above 4.5  Continue on D5 LR  Started clear liquid diet  Likely need repeat CT abdomen for follow-up for ongoing abscess  Repeat CBC in a.m. to monitor hemoglobin, white count  Repeat BMP in a.m. to monitor electrolytes, renal function  Patient is requiring IV narcotics for pain management.  Monitor for toxicity  Serial abdominal exam  Case discussed with Dr. Parker.  Okay to start clear liquid diet

## 2024-12-15 NOTE — CONSULTS
DATE OF CONSULTATION:  12/14/2024     REFERRING PHYSICIAN:   Juan Daniel Valencia M.D.     CONSULTING PHYSICIAN:  Edda Parker M.D.     REASON FOR CONSULTATION:  I have been asked by Dr. Valencia to see the patient in surgical consultation for evaluation of SBO.    HISTORY OF PRESENT ILLNESS: The patient is a 54 year-old White man who presents to the Emergency Department with a 2-day history of moderate generalized abdominal pain. The pain is associated with nausea. The patient denies any recent or intercurrent illness. The patient has undergone diver.  The patient has history of rectal cancer undergoing APR and colostomy. 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     PAST MEDICAL HISTORY:  has a past medical history of Cancer (HCC) (2003), Ileostomy in place (HCC), and Small bowel obstruction (HCC).    PAST SURGICAL HISTORY:  has a past surgical history that includes other abdominal surgery; hydrocelectomy adult; pr lap,diagnostic abdomen (5/31/2022); and laparoscopic lysis of adhesions (5/31/2022).    ALLERGIES:   Allergies   Allergen Reactions    Hydromorphone Hives     Liquid form only per pt    Oxycodone Hives    Zosyn Rash and Swelling     Patient stated arm became red and swollen during infusion  Tolerates cephalosporins    Cantaloupe Itching     Throat itches    Tape Rash     Rash and blistering with prolonged exposure. Some tape causes immediate rash, burning. Paper tape OK per patient.       CURRENT MEDICATIONS:    Home Medications    **Home medications have not yet been reviewed for this encounter**         FAMILY HISTORY: family history includes Cancer in his mother; Colon Cancer in his mother.    SOCIAL HISTORY:  reports that he quit smoking about 30 years ago. His smoking use included cigarettes. His smokeless tobacco use includes chew. He reports that he does not currently use alcohol. He reports that he does not currently use drugs.    REVIEW OF SYSTEMS: Comprehensive review of systems is  Removed . Manual pressure applied   negative with the exception of the aforementioned HPI, PMH, and PSH bullets in accordance with CMS guidelines.    PHYSICAL EXAMINATION:    Physical Exam  Cardiovascular:      Rate and Rhythm: Normal rate.   Pulmonary:      Effort: Pulmonary effort is normal.   Abdominal:      General: There is no distension.      Palpations: Abdomen is soft.      Tenderness: There is abdominal tenderness.      Comments: Ostomy in RLQ  Pink, functioning.   Musculoskeletal:         General: Normal range of motion.      Cervical back: Neck supple.   Skin:     General: Skin is warm.   Neurological:      General: No focal deficit present.      Mental Status: He is alert.         LABORATORY VALUES:   Recent Labs     12/14/24  1847   WBC 9.2   RBC 4.67*   HEMOGLOBIN 14.4   HEMATOCRIT 42.5   MCV 91.0   MCH 30.8   MCHC 33.9   RDW 46.3   PLATELETCT 211   MPV 8.6*     Recent Labs     12/14/24  1847   SODIUM 137   POTASSIUM 3.7   CHLORIDE 100   CO2 29   GLUCOSE 95   BUN 14   CREATININE 0.93   CALCIUM 8.2*                IMAGING:   CT-OUTSIDE IMAGES-CT ABDOMEN/PELVIS   Final Result          ASSESSMENT AND PLAN:     * SBO (small bowel obstruction) (HCC)- (present on admission)  Assessment & Plan  2 day hx of abd pain  Hx of colon cancer - sp colectomy, colostomy  CT shows SBO and ? Fluid collection  Non op management          DISPOSITION: Medical evaluation and admission. The patient was admitted to the Medical Service prior to surgical consultation. [facility] Surgery [facility] Service will follow.     ____________________________________     Edda Parker M.D.    DD: 12/14/2024  6:54 PM    AAST Grading System for EGS Conditions  ACS NSQIP Surgical Risk Calculator

## 2024-12-15 NOTE — PROGRESS NOTES
Hospital Medicine Daily Progress Note    Date of Service  12/15/2024    Chief Complaint  Beau Mcintyre is a 54 y.o. male admitted 12/14/2024 with abdominal pain    Hospital Course    54 y.o. male with history of colorectal cancer status post bowel resection and colostomy placement in 2022 complicated by small bowel obstruction last 1 a few months ago treated conservatively for  fistula and intra-abdominal abscess, who presented 12/14/2024 as a direct admit from outside facility with small bowel obstruction and presacral abscess .    Interval Problem Update    12/15/2024  Seen and examined bedside at bedside  Vital remained stable, remain afebrile  pain well-controlled, has colostomy output  Labs reviewed noted to have normal white count, hemoglobin 13, sodium 100 and potassium 3.3, renal function is stable.  CT abdomen concerning for small bowel obstruction  small presacral abscess cannot be excluded.    Continue on IV Rocephin, metronidazole  Replace Kcl, keep potassium above 4.5  Continue on D5 LR  Started clear liquid diet  Likely need repeat CT abdomen for follow-up for ongoing abscess  Repeat CBC in a.m. to monitor hemoglobin, white count  Repeat BMP in a.m. to monitor electrolytes, renal function  Patient is requiring IV narcotics for pain management.  Monitor for toxicity  Serial abdominal exam  Case discussed with Dr. Parker.  Okay to start clear liquid diet            I have discussed this patient's plan of care and discharge plan at IDT rounds today with Case Management, Nursing, Nursing leadership, and other members of the IDT team.    Consultants/Specialty  general surgery    Code Status  Full Code    Disposition  The patient is not medically cleared for discharge to home or a post-acute facility.  Anticipate discharge to: home with close outpatient follow-up    I have placed the appropriate orders for post-discharge needs.    Review of Systems  Review of Systems   Gastrointestinal:  Negative for  abdominal pain, nausea and vomiting.        Physical Exam  Temp:  [36.1 °C (97 °F)-37.3 °C (99.2 °F)] 36.4 °C (97.6 °F)  Pulse:  [72-88] 72  Resp:  [16-18] 16  BP: ()/(57-65) 109/58  SpO2:  [91 %-92 %] 92 %    Physical Exam  Constitutional:       Appearance: Normal appearance.   Cardiovascular:      Rate and Rhythm: Normal rate and regular rhythm.   Pulmonary:      Effort: Pulmonary effort is normal.   Abdominal:      General: Abdomen is flat. There is no distension.      Tenderness: There is no abdominal tenderness.      Comments: Colostomy bag noted with liquid output   Musculoskeletal:      Right lower leg: No edema.      Left lower leg: No edema.   Neurological:      Mental Status: He is alert and oriented to person, place, and time.         Fluids    Intake/Output Summary (Last 24 hours) at 12/15/2024 0928  Last data filed at 12/14/2024 2100  Gross per 24 hour   Intake 200 ml   Output --   Net 200 ml        Laboratory  Recent Labs     12/14/24  1847 12/15/24  0348   WBC 9.2 8.9   RBC 4.67* 4.07*   HEMOGLOBIN 14.4 13.0*   HEMATOCRIT 42.5 37.1*   MCV 91.0 91.2   MCH 30.8 31.9   MCHC 33.9 35.0   RDW 46.3 45.4   PLATELETCT 211 203   MPV 8.6* 8.8*     Recent Labs     12/14/24  1847 12/15/24  0348   SODIUM 137 139   POTASSIUM 3.7 3.3*   CHLORIDE 100 105   CO2 29 24   GLUCOSE 95 87   BUN 14 13   CREATININE 0.93 0.90   CALCIUM 8.2* 7.9*                   Imaging  CT-OUTSIDE IMAGES-CT ABDOMEN/PELVIS   Final Result           Assessment/Plan  * SBO (small bowel obstruction) (HCC)- (present on admission)  Assessment & Plan  colorectal cancer status post bowel resection and colostomy placement in 2022 complicated by small bowel obstruction last 1 a few months ago treated conservatively for  fistula and intra-abdominal abscess      12/15/2024  CT abdomen concerning for small bowel obstruction, small presacral abscess cannot be excluded.  Continue on IV Rocephin, metronidazole  Replace Kcl, keep potassium above  4.5  Continue on D5 LR  Started clear liquid diet  Likely need repeat CT abdomen for follow-up for ongoing abscess  Repeat CBC in a.m. to monitor hemoglobin, white count  Repeat BMP in a.m. to monitor electrolytes, renal function  Patient is requiring IV narcotics for pain management.  Monitor for toxicity  Serial abdominal exam  Case discussed with Dr. Parker.  Okay to start clear liquid diet    Hypokalemia  Assessment & Plan  KCl replaced  Check magnesium    Repeat BMP in a.m.    Abdominal fluid collection- (present on admission)  Assessment & Plan  CT with contrast: A small presacral abscess measuring 3 x 1.6 x 3 cm not excluded  General surgery following  Continue Flagyl and Levaquin  Likely repeat CT abdomen for abscess follow-up    Colon cancer (HCC)- (present on admission)  Assessment & Plan   colorectal cancer status post bowel resection and colostomy  Completed course of chemotherapy  .  With PA         VTE prophylaxis: lovenox    I have performed a physical exam and reviewed and updated ROS and Plan today (12/15/2024). In review of yesterday's note (12/14/2024), there are no changes except as documented above.

## 2024-12-15 NOTE — H&P
Hospital Medicine History & Physical Note    Date of Service  12/14/2024    Primary Care Physician  Pcp Pt States None      Code Status  Full Code    Chief Complaint  Abdominal pain    History of Presenting Illness  Beau Mcintyre is a 54 y.o. male with history of colorectal cancer status post bowel resection and colostomy placement in 2022 complicated by small bowel obstruction last 1 a few months ago treated conservatively, fistula and intra-abdominal abscess, who presented 12/14/2024 as a direct admit from [facility] with small bowel obstruction and abscess  He presented to the outside hospital complaining of abdominal pain generalized that started yesterday, associated with nausea, loss of appetite.  He had decreased output from colostomy.  He has been passing some gases and lites did not have vomiting or diarrhea..  Last BM was earlier today.  He also managed to have sandwich in the morning.  Vitals are stable, on room air, afebrile.  Pain level 5 out of 10.  Chemistry panel was normal.  Lipase and amylase are not elevated.  CBC: WBC 11.9, hemoglobin 16.4, platelets 197.  CT of the abdomen pelvis with contrast: Right lower quadrant colostomy with   postsurgical changes within the mid to lower abdomen and upper pelvis and presacral space involving the large and small bowel segments.  Dilated loop of small bowel within the left lower quadrant up to 6 cm in diameter suggest developing mechanical obstruction.  This may be related to adhesions between a residual colonic stump and small bowel loops.  A fistulous connection between bowel segments not excluded.  A small presacral abscess measuring 3 x 1.6 x 3 cm not excluded.  General surgery consultation recommended.  Perhaps enteral contrast or even rectal contrast administration may be of use.  Patient was treated with IV Zosyn that caused a reaction of hives that was stopped and treated with IV Benadryl.  He was then switched to IV Levaquin 750 mg  IV and Flagyl.  Patient was given IV morphine several doses, 3 mg, IV fluids 1 L of normal saline.  He had some vomiting in the ER.   NG tube was not placed.    I discussed case with , who agreed to see the patient.    I saw the patient at bedside and he is currently resting comfortably and denies nausea vomiting or abdominal pain.    I discussed the plan of care with patient, bedside RN, and Dr. Parker/ surgery .    Review of Systems  Review of Systems   Constitutional:  Negative for chills, fever and weight loss.   HENT:  Negative for ear pain, hearing loss and tinnitus.    Eyes:  Negative for blurred vision, double vision and photophobia.   Respiratory:  Negative for cough, hemoptysis and sputum production.    Cardiovascular:  Negative for chest pain, palpitations and orthopnea.   Gastrointestinal:  Positive for abdominal pain, nausea and vomiting. Negative for diarrhea and heartburn.   Genitourinary:  Negative for dysuria, flank pain, frequency and hematuria.   Musculoskeletal:  Negative for back pain, joint pain and neck pain.   Skin:  Negative for itching and rash.   Neurological:  Negative for tremors, speech change, focal weakness and headaches.   Endo/Heme/Allergies:  Negative for environmental allergies and polydipsia. Does not bruise/bleed easily.   Psychiatric/Behavioral:  Negative for hallucinations and substance abuse. The patient is not nervous/anxious.        Past Medical History   has a past medical history of Cancer (HCC) (2003), Ileostomy in place (HCC), and Small bowel obstruction (HCC).    Surgical History   has a past surgical history that includes other abdominal surgery; hydrocelectomy adult; pr lap,diagnostic abdomen (5/31/2022); and laparoscopic lysis of adhesions (5/31/2022).     Family History  family history includes Cancer in his mother; Colon Cancer in his mother.   Family history reviewed with patient. There is no family history that is pertinent to the chief complaint.      Social History   reports that he quit smoking about 30 years ago. His smoking use included cigarettes. His smokeless tobacco use includes chew. He reports that he does not currently use alcohol. He reports that he does not currently use drugs.    Allergies  Allergies   Allergen Reactions    Hydromorphone Hives     Liquid form only per pt    Oxycodone Hives    Cantaloupe Itching     Throat itches    Tape Rash     Rash and blistering with prolonged exposure. Some tape causes immediate rash, burning. Paper tape OK per patient.       Medications  None       Physical Exam  Temp:  [36.1 °C (97 °F)] 36.1 °C (97 °F)  Pulse:  [88] 88  Resp:  [18] 18  BP: (106)/(65) 106/65  SpO2:  [92 %] 92 %  Blood Pressure: 106/65   Temperature: 36.1 °C (97 °F)   Pulse: 88   Respiration: 18   Pulse Oximetry: 92 %       Physical Exam  Vitals and nursing note reviewed.   Constitutional:       General: He is not in acute distress.     Appearance: Normal appearance.   HENT:      Head: Normocephalic and atraumatic.      Nose: Nose normal.      Mouth/Throat:      Mouth: Mucous membranes are moist.   Eyes:      Extraocular Movements: Extraocular movements intact.      Pupils: Pupils are equal, round, and reactive to light.   Cardiovascular:      Rate and Rhythm: Normal rate and regular rhythm.   Pulmonary:      Effort: Pulmonary effort is normal.      Breath sounds: Normal breath sounds.   Abdominal:      General: Abdomen is flat. There is no distension.      Tenderness: There is no abdominal tenderness.      Comments: Colostomy in the right lower quadrant with small amount of liquid dark brown content   Musculoskeletal:         General: No swelling or deformity. Normal range of motion.      Cervical back: Normal range of motion and neck supple.   Skin:     General: Skin is warm and dry.   Neurological:      General: No focal deficit present.      Mental Status: He is alert and oriented to person, place, and time.   Psychiatric:         Mood  "and Affect: Mood normal.         Behavior: Behavior normal.         Laboratory:          No results for input(s): \"ALTSGPT\", \"ASTSGOT\", \"ALKPHOSPHAT\", \"TBILIRUBIN\", \"DBILIRUBIN\", \"GAMMAGT\", \"AMYLASE\", \"LIPASE\", \"ALB\", \"PREALBUMIN\", \"GLUCOSE\" in the last 72 hours.      No results for input(s): \"NTPROBNP\" in the last 72 hours.      No results for input(s): \"TROPONINT\" in the last 72 hours.    Imaging:  No orders to display           Assessment/Plan:  Justification for Admission Status  I anticipate this patient will require at least two midnights for appropriate medical management, necessitating inpatient admission because small bowel obstruction    Patient will need a Med/Surg bed on MEDICAL service .  The need is secondary to small bowel obstruction.    * SBO (small bowel obstruction) (HCC)- (present on admission)  Assessment & Plan  54-year-old male with history of colorectal cancer presented with recurrent episode of abdominal pain nausea and vomiting and CT of the abdomen showed possible small bowel obstruction ( Dilated loop of small bowel within the left lower quadrant up to 6 cm in diameter suggest developing mechanical obstruction.  This may be related to adhesions between a residual colonic stump and small bowel loops)  Plan: Requested general surgery recommendations.  Expected that patient may require repeat CT with oral or rectal contrast and NG tube  Continue Zofran as needed  IV fluid support    Abdominal fluid collection- (present on admission)  Assessment & Plan  CT with contrast: A small presacral abscess measuring 3 x 1.6 x 3 cm not excluded  Will consider IR consult for drainage after general surgery consultation.  Continue Flagyl and Levaquin        VTE prophylaxis: enoxaparin ppx    I spent 55 minutes analyzing outside facility medical records.  "

## 2024-12-15 NOTE — ASSESSMENT & PLAN NOTE
2 day hx of abd pain  Hx of colon cancer - sp colectomy, colostomy  CT shows SBO and ? Fluid collection  Non op management  12/15 Ostomy functioining - reg diet.

## 2024-12-15 NOTE — CARE PLAN
The patient is Stable - Low risk of patient condition declining or worsening    Shift Goals  Clinical Goals: Patient will have pain rating of <4 during this shift, UG PIV  Patient Goals: pain control  Family Goals: none present    Progress made toward(s) clinical / shift goals:  Patient is alert and oriented, needs addressed at this time. Medicated for pain W/effect.NPO. call light within reach. Hourly rounding in place.       Problem: Pain - Standard  Goal: Alleviation of pain or a reduction in pain to the patient’s comfort goal  Outcome: Progressing       Patient is not progressing towards the following goals:

## 2024-12-15 NOTE — CARE PLAN
The patient is Stable - Low risk of patient condition declining or worsening    Shift Goals  Clinical Goals: pain less than 3 after interventions  Patient Goals: comfort  Family Goals: eulalio    Progress made toward(s) clinical / shift goals:  pt is a&o x4. O2 sats maintained on room air. IV patent and infusing. Pain medication given per MAR. Pt is up self, low fall risk. All needs currently addressed.    Patient is not progressing towards the following goals:

## 2024-12-15 NOTE — ASSESSMENT & PLAN NOTE
colorectal cancer status post bowel resection and colostomy  Completed course of chemotherapy  .  With PA

## 2024-12-16 ENCOUNTER — PHARMACY VISIT (OUTPATIENT)
Dept: PHARMACY | Facility: MEDICAL CENTER | Age: 54
End: 2024-12-16
Payer: COMMERCIAL

## 2024-12-16 ENCOUNTER — APPOINTMENT (OUTPATIENT)
Dept: RADIOLOGY | Facility: MEDICAL CENTER | Age: 54
DRG: 388 | End: 2024-12-16
Attending: STUDENT IN AN ORGANIZED HEALTH CARE EDUCATION/TRAINING PROGRAM
Payer: COMMERCIAL

## 2024-12-16 VITALS
WEIGHT: 200.84 LBS | BODY MASS INDEX: 26.62 KG/M2 | TEMPERATURE: 97.3 F | RESPIRATION RATE: 18 BRPM | HEART RATE: 61 BPM | OXYGEN SATURATION: 97 % | SYSTOLIC BLOOD PRESSURE: 93 MMHG | HEIGHT: 73 IN | DIASTOLIC BLOOD PRESSURE: 78 MMHG

## 2024-12-16 LAB
ANION GAP SERPL CALC-SCNC: 9 MMOL/L (ref 7–16)
BUN SERPL-MCNC: 7 MG/DL (ref 8–22)
CALCIUM SERPL-MCNC: 8.4 MG/DL (ref 8.5–10.5)
CHLORIDE SERPL-SCNC: 106 MMOL/L (ref 96–112)
CO2 SERPL-SCNC: 24 MMOL/L (ref 20–33)
CREAT SERPL-MCNC: 0.89 MG/DL (ref 0.5–1.4)
GFR SERPLBLD CREATININE-BSD FMLA CKD-EPI: 102 ML/MIN/1.73 M 2
GLUCOSE SERPL-MCNC: 105 MG/DL (ref 65–99)
MAGNESIUM SERPL-MCNC: 1.9 MG/DL (ref 1.5–2.5)
PHOSPHATE SERPL-MCNC: 2 MG/DL (ref 2.5–4.5)
POTASSIUM SERPL-SCNC: 3.4 MMOL/L (ref 3.6–5.5)
SODIUM SERPL-SCNC: 139 MMOL/L (ref 135–145)

## 2024-12-16 PROCEDURE — 84100 ASSAY OF PHOSPHORUS: CPT

## 2024-12-16 PROCEDURE — 74177 CT ABD & PELVIS W/CONTRAST: CPT

## 2024-12-16 PROCEDURE — 99239 HOSP IP/OBS DSCHRG MGMT >30: CPT | Performed by: STUDENT IN AN ORGANIZED HEALTH CARE EDUCATION/TRAINING PROGRAM

## 2024-12-16 PROCEDURE — RXMED WILLOW AMBULATORY MEDICATION CHARGE: Performed by: STUDENT IN AN ORGANIZED HEALTH CARE EDUCATION/TRAINING PROGRAM

## 2024-12-16 PROCEDURE — 83735 ASSAY OF MAGNESIUM: CPT

## 2024-12-16 PROCEDURE — A9270 NON-COVERED ITEM OR SERVICE: HCPCS | Performed by: STUDENT IN AN ORGANIZED HEALTH CARE EDUCATION/TRAINING PROGRAM

## 2024-12-16 PROCEDURE — 700111 HCHG RX REV CODE 636 W/ 250 OVERRIDE (IP): Mod: JG | Performed by: INTERNAL MEDICINE

## 2024-12-16 PROCEDURE — 700102 HCHG RX REV CODE 250 W/ 637 OVERRIDE(OP): Performed by: STUDENT IN AN ORGANIZED HEALTH CARE EDUCATION/TRAINING PROGRAM

## 2024-12-16 PROCEDURE — 700117 HCHG RX CONTRAST REV CODE 255: Performed by: STUDENT IN AN ORGANIZED HEALTH CARE EDUCATION/TRAINING PROGRAM

## 2024-12-16 PROCEDURE — 80048 BASIC METABOLIC PNL TOTAL CA: CPT

## 2024-12-16 RX ORDER — POTASSIUM CHLORIDE 1500 MG/1
40 TABLET, EXTENDED RELEASE ORAL EVERY 4 HOURS
Status: COMPLETED | OUTPATIENT
Start: 2024-12-16 | End: 2024-12-16

## 2024-12-16 RX ADMIN — FAMOTIDINE 20 MG: 10 INJECTION, SOLUTION INTRAVENOUS at 04:59

## 2024-12-16 RX ADMIN — DIBASIC SODIUM PHOSPHATE, MONOBASIC POTASSIUM PHOSPHATE AND MONOBASIC SODIUM PHOSPHATE 500 MG: 852; 155; 130 TABLET ORAL at 07:53

## 2024-12-16 RX ADMIN — AMOXICILLIN AND CLAVULANATE POTASSIUM 1 TABLET: 875; 125 TABLET, FILM COATED ORAL at 11:59

## 2024-12-16 RX ADMIN — POTASSIUM CHLORIDE 40 MEQ: 1500 TABLET, EXTENDED RELEASE ORAL at 07:53

## 2024-12-16 RX ADMIN — METRONIDAZOLE 500 MG: 500 INJECTION, SOLUTION INTRAVENOUS at 04:59

## 2024-12-16 RX ADMIN — IOHEXOL 100 ML: 350 INJECTION, SOLUTION INTRAVENOUS at 11:00

## 2024-12-16 RX ADMIN — POTASSIUM CHLORIDE 40 MEQ: 1500 TABLET, EXTENDED RELEASE ORAL at 10:08

## 2024-12-16 NOTE — CARE PLAN
The patient is Stable - Low risk of patient condition declining or worsening    Shift Goals  Clinical Goals: pt will tolerate diet, Pain will be <3 during this shift  Patient Goals: pain control  Family Goals: none present      Problem: Pain - Standard  Goal: Alleviation of pain or a reduction in pain to the patient’s comfort goal  Outcome: Progressing     Problem: Knowledge Deficit - Standard  Goal: Patient and family/care givers will demonstrate understanding of plan of care, disease process/condition, diagnostic tests and medications  Outcome: Progressing       Progress made toward(s) clinical / shift goals:  Patient is alert and oriented. Medicated for pain w/effect. Patient tolerating clq diet well. Denies nausea. Call light within reach.     Patient is not progressing towards the following goals:

## 2024-12-16 NOTE — PROGRESS NOTES
Discharge orders received.  Patient arrived to the discharge lounge.  PIV removed by discharge RN. Meds to beds medications verified by discharge RN, bag with medication given to patient.  Instructions given, medications reviewed and general discharge education provided to patient.  Follow up appointments discussed.  Patient verbalized understanding of dc instructions and prescriptions.  Patient signed discharge instructions.  Patient verbalized he had all belongings with him, Denied having any home medications locked in our inpatient pharmacy that  he needs back. Patient ambulated with steady gait from discharge lounge to UBER. Patient left via UBER to home in stable condition.

## 2024-12-16 NOTE — DISCHARGE SUMMARY
Discharge Summary    CHIEF COMPLAINT ON ADMISSION  No chief complaint on file.      Reason for Admission  SBO     Admission Date  12/14/2024    CODE STATUS  Full Code    HPI & HOSPITAL COURSE    54 y.o. male with history of colorectal cancer status post bowel resection and colostomy placement in 2022 complicated by small bowel obstruction last 1 a few months ago treated conservatively for  fistula and intra-abdominal abscess, who presented 12/14/2024 as a direct admit from outside facility with small bowel obstruction and suspected presacral abscess .  During hospital course patient had good ostomy output with no further concern of SBO, remained asymptomatic.  Surgery evaluated and recommended nonoperative management and discharge plan .  Repeat CT abdomen pelvis on 12/16 with no concern for abscess likely postsurgical changes.  Patient with send home with empirical additional 5 days course of antibiotics.  Patient has tolerated Augmentin in the past.  Will monitor for allergy on Augmentin for discharge. Recommended to follow-up with surgery on discharge    Patient seen and examined at bedside the results.  Tolerating p.o. diet, good ostomy output.  Denies any discomfort  At the time of discharge patient remain hemodynamically stable ,asymptomatic ,labs remain unremarkable .  Patient will be discharged with close follow up with PCP, surgery.Discharge plan was discussed with patient in details .  Patient agreed with discharge plan  and  all questions answered.        Therefore, he is discharged in good and stable condition to home with close outpatient follow-up.    The patient met 2-midnight criteria for an inpatient stay at the time of discharge.    Discharge Date  12/16/2024        DISCHARGE DIAGNOSES  Principal Problem:    SBO (small bowel obstruction) (HCC) (POA: Yes)  Active Problems:    Colon cancer (HCC) (POA: Yes)    Abdominal fluid collection (POA: Yes)    Hypokalemia (POA: Unknown)  Resolved Problems:    *  No resolved hospital problems. *      FOLLOW UP  No future appointments.  Edda Parker M.D.  [address] [id number] [phone]    Follow up in 1 week(s)        MEDICATIONS ON DISCHARGE     Medication List        START taking these medications        Instructions   amoxicillin-clavulanate 875-125 MG Tabs  Commonly known as: Augmentin   Take 1 Tablet by mouth 2 times a day for 5 days.  Dose: 1 Tablet              Allergies  Allergies   Allergen Reactions    Hydromorphone Hives     Liquid form only per pt    Oxycodone Hives    Zosyn Rash and Swelling     Patient stated arm became red and swollen during infusion  Tolerates cephalosporins    Cantaloupe Itching     Throat itches    Tape Rash     Rash and blistering with prolonged exposure. Some tape causes immediate rash, burning. Paper tape OK per patient.       DIET  Orders Placed This Encounter   Procedures    Diet Order Diet: Regular     Standing Status:   Standing     Number of Occurrences:   1     Order Specific Question:   Diet:     Answer:   Regular [1]       ACTIVITY  As tolerated.  Weight bearing as tolerated    CONSULTATIONS  Surgery     PROCEDURES  CT-ABDOMEN-PELVIS WITH   Final Result      1. Well-defined fluid attenuation area in the presacral soft tissues measuring 4.8 x 2 x 2.5 cm in diameter. The signature of the surrounding soft tissues is that of bowel wall, favoring a bowel loop with postsurgical changes rather than a presacral    abscess.   2. Right lower quadrant ostomy.   3. No bowel obstruction.   4. Postsurgical changes of colectomy.         CT-OUTSIDE IMAGES-CT ABDOMEN/PELVIS   Final Result            LABORATORY  Lab Results   Component Value Date    SODIUM 139 12/16/2024    POTASSIUM 3.4 (L) 12/16/2024    CHLORIDE 106 12/16/2024    CO2 24 12/16/2024    GLUCOSE 105 (H) 12/16/2024    BUN 7 (L) 12/16/2024    CREATININE 0.89 12/16/2024        Lab Results   Component Value Date    WBC 8.9 12/15/2024    HEMOGLOBIN 13.0 (L)  12/15/2024    HEMATOCRIT 37.1 (L) 12/15/2024    PLATELETCT 203 12/15/2024        Total time of the discharge process exceeds 33 minutes.

## 2025-03-20 NOTE — ED NOTES
ERP at bedside     on discharge 38 minutes spent in assessing patient, reviewing medications, discussion with nursing, confirming safe discharge plan and preparation of discharge summary.    Signed:  Electronically signed by LENNOX Diallo CNP on 3/19/25 at 11:32 AM CDT         EMR Dragon/Transcription disclaimer:   Much of this encounter note is an electronic transcription/translation of spoken language to printed text. The electronic translation of spoken language may permit erroneous, or at times, nonsensical words or phrases to be inadvertently transcribed; although attempts have made to review the note for such errors, some may still exist.

## 2025-06-22 ENCOUNTER — HOSPITAL ENCOUNTER (OUTPATIENT)
Facility: MEDICAL CENTER | Age: 55
End: 2025-06-24
Attending: HOSPITALIST | Admitting: HOSPITALIST
Payer: COMMERCIAL

## 2025-06-22 ENCOUNTER — APPOINTMENT (OUTPATIENT)
Dept: RADIOLOGY | Facility: MEDICAL CENTER | Age: 55
End: 2025-06-22
Payer: COMMERCIAL

## 2025-06-22 PROBLEM — K56.609 BOWEL OBSTRUCTION (HCC): Status: ACTIVE | Noted: 2025-06-22

## 2025-06-22 PROBLEM — D72.829 LEUKOCYTOSIS: Status: ACTIVE | Noted: 2025-06-22

## 2025-06-22 PROBLEM — E87.20 METABOLIC ACIDOSIS: Status: ACTIVE | Noted: 2025-06-22

## 2025-06-22 PROBLEM — R00.0 TACHYCARDIA: Status: ACTIVE | Noted: 2025-06-22

## 2025-06-22 PROBLEM — E86.0 DEHYDRATION: Status: ACTIVE | Noted: 2025-06-22

## 2025-06-22 ASSESSMENT — ENCOUNTER SYMPTOMS
FEVER: 0
COUGH: 0
FLANK PAIN: 0
VOMITING: 1
BRUISES/BLEEDS EASILY: 0
FOCAL WEAKNESS: 0
EYE REDNESS: 0
STRIDOR: 0
MYALGIAS: 0
SHORTNESS OF BREATH: 0
CHILLS: 0
EYE DISCHARGE: 0
NERVOUS/ANXIOUS: 0
ABDOMINAL PAIN: 1

## 2025-06-22 ASSESSMENT — PAIN DESCRIPTION - PAIN TYPE
TYPE: ACUTE PAIN

## 2025-06-22 ASSESSMENT — FIBROSIS 4 INDEX
FIB4 SCORE: 1.23
FIB4 SCORE: 1.23

## 2025-06-22 NOTE — PROGRESS NOTES
[facility] DIRECT ADMISSION REPORT  Transferring facility & physician:   [facility]   Referring Provider: Jozef Neal M.D.     Chief complaint: Bowel obstruction    Reason for Transfer: Neurosurgery consultation    Pertinent history & patient course: 54 years old male with a history of colon cancer s/p bowel resection and colostomy placed 2022 presenting with abdominal pain, distention and constipation.  The patient has nausea but no vomiting.  Hemodynamically stable. VS: /73 HR 96 95% on RA.       Consultants called prior to transfer and pertinent input from consultants: General surgery, Dr Gomez      Consultants to be called upon arrival: General surgery, Dr Gomez, aware of the transfer and will see the patient tomorrow during rounds    Code Status: Full per transferring provider, I personally verified with the transferring provider patient's code status and the transferring provider has confirmed this with the patient.    Patient accepted for transfer: Yes    Accepting [facility]: [facility] - Nursing to notify the admitting provider when patient arrives to the unit.    Admission status: Inpatient.     Floor requested: [facility]    The admitting provider is the point of contact for questions or concerns regarding patient's care.

## 2025-06-23 PROBLEM — Z71.6 TOBACCO ABUSE COUNSELING: Status: ACTIVE | Noted: 2025-06-23

## 2025-06-23 PROBLEM — Z71.89 ADVANCE CARE PLANNING: Status: ACTIVE | Noted: 2025-06-23

## 2025-06-23 SDOH — ECONOMIC STABILITY: TRANSPORTATION INSECURITY
IN THE PAST 12 MONTHS, HAS LACK OF RELIABLE TRANSPORTATION KEPT YOU FROM MEDICAL APPOINTMENTS, MEETINGS, WORK OR FROM GETTING THINGS NEEDED FOR DAILY LIVING?: NO

## 2025-06-23 SDOH — ECONOMIC STABILITY: TRANSPORTATION INSECURITY
IN THE PAST 12 MONTHS, HAS THE LACK OF TRANSPORTATION KEPT YOU FROM MEDICAL APPOINTMENTS OR FROM GETTING MEDICATIONS?: NO

## 2025-06-23 ASSESSMENT — LIFESTYLE VARIABLES
HOW MANY TIMES IN THE PAST YEAR HAVE YOU HAD 5 OR MORE DRINKS IN A DAY: 0
HAVE PEOPLE ANNOYED YOU BY CRITICIZING YOUR DRINKING: NO
EVER HAD A DRINK FIRST THING IN THE MORNING TO STEADY YOUR NERVES TO GET RID OF A HANGOVER: NO
CONSUMPTION TOTAL: NEGATIVE
SUBSTANCE_ABUSE: 0
HAVE YOU EVER FELT YOU SHOULD CUT DOWN ON YOUR DRINKING: NO
DOES PATIENT WANT TO STOP DRINKING: NO
AVERAGE NUMBER OF DAYS PER WEEK YOU HAVE A DRINK CONTAINING ALCOHOL: 0
TOTAL SCORE: 0
EVER FELT BAD OR GUILTY ABOUT YOUR DRINKING: NO
ALCOHOL_USE: NO
TOTAL SCORE: 0
ON A TYPICAL DAY WHEN YOU DRINK ALCOHOL HOW MANY DRINKS DO YOU HAVE: 0
TOTAL SCORE: 0

## 2025-06-23 ASSESSMENT — COGNITIVE AND FUNCTIONAL STATUS - GENERAL
SUGGESTED CMS G CODE MODIFIER DAILY ACTIVITY: CH
DAILY ACTIVITIY SCORE: 24
SUGGESTED CMS G CODE MODIFIER MOBILITY: CH
MOBILITY SCORE: 24

## 2025-06-23 ASSESSMENT — ENCOUNTER SYMPTOMS
FEVER: 0
HEARTBURN: 0
NERVOUS/ANXIOUS: 0
BACK PAIN: 0
FALLS: 0
SHORTNESS OF BREATH: 0
DIZZINESS: 0
PALPITATIONS: 0
BLURRED VISION: 0
COUGH: 0
NAUSEA: 1
HEADACHES: 0
CHILLS: 0
ABDOMINAL PAIN: 1
DOUBLE VISION: 0

## 2025-06-23 ASSESSMENT — SOCIAL DETERMINANTS OF HEALTH (SDOH)
WITHIN THE LAST YEAR, HAVE YOU BEEN HUMILIATED OR EMOTIONALLY ABUSED IN OTHER WAYS BY YOUR PARTNER OR EX-PARTNER?: NO
WITHIN THE LAST YEAR, HAVE YOU BEEN AFRAID OF YOUR PARTNER OR EX-PARTNER?: NO
IN THE PAST 12 MONTHS, HAS THE ELECTRIC, GAS, OIL, OR WATER COMPANY THREATENED TO SHUT OFF SERVICE IN YOUR HOME?: NO
WITHIN THE PAST 12 MONTHS, YOU WORRIED THAT YOUR FOOD WOULD RUN OUT BEFORE YOU GOT THE MONEY TO BUY MORE: NEVER TRUE
WITHIN THE LAST YEAR, HAVE TO BEEN RAPED OR FORCED TO HAVE ANY KIND OF SEXUAL ACTIVITY BY YOUR PARTNER OR EX-PARTNER?: NO
WITHIN THE PAST 12 MONTHS, THE FOOD YOU BOUGHT JUST DIDN'T LAST AND YOU DIDN'T HAVE MONEY TO GET MORE: NEVER TRUE
WITHIN THE LAST YEAR, HAVE YOU BEEN KICKED, HIT, SLAPPED, OR OTHERWISE PHYSICALLY HURT BY YOUR PARTNER OR EX-PARTNER?: NO

## 2025-06-23 ASSESSMENT — PAIN DESCRIPTION - PAIN TYPE
TYPE: ACUTE PAIN

## 2025-06-23 ASSESSMENT — FIBROSIS 4 INDEX: FIB4 SCORE: 1.58

## 2025-06-23 NOTE — PROGRESS NOTES
Surgical Progress Note    Author: Romaine Gomez M.D. Date & Time created: 2025   6:24 AM     Interval Events:  Patient seen for large bowel obstruction.  He had a total proctocolectomy in 2015 for colon cancer and has been cancer free ever since    He states that yearly, he developed some abdominal pain and is treated conservatively for bowel obstruction    At the present time, he has no pain and an ileostomy filled with gas and stool  ROS  Hemodynamics:  Temp (24hrs), Av.4 °C (99.3 °F), Min:36.4 °C (97.6 °F), Max:38.3 °C (100.9 °F)  Temperature: 36.4 °C (97.6 °F)  Pulse  Av  Min: 101  Max: 104   Blood Pressure: 91/58     Respiratory:    Respiration: 19, Pulse Oximetry: 90 %           Neuro:  GCS       Fluids:    Intake/Output Summary (Last 24 hours) at 2025 0624  Last data filed at 2025 1917  Gross per 24 hour   Intake --   Output 100 ml   Net -100 ml     Weight: 91.7 kg (202 lb 2.6 oz)  Current Diet Order   Procedures    Diet NPO Restrict to: Strict     Physical Exam  Labs:  Recent Results (from the past 24 hours)   CBC WITH DIFFERENTIAL    Collection Time: 25  7:22 PM   Result Value Ref Range    WBC 14.4 (H) 4.8 - 10.8 K/uL    RBC 5.40 4.70 - 6.10 M/uL    Hemoglobin 17.2 14.0 - 18.0 g/dL    Hematocrit 48.1 42.0 - 52.0 %    MCV 89.1 81.4 - 97.8 fL    MCH 31.9 27.0 - 33.0 pg    MCHC 35.8 32.3 - 36.5 g/dL    RDW 42.3 35.9 - 50.0 fL    Platelet Count 231 164 - 446 K/uL    MPV 9.0 9.0 - 12.9 fL    Neutrophils-Polys 90.80 (H) 44.00 - 72.00 %    Lymphocytes 4.80 (L) 22.00 - 41.00 %    Monocytes 3.50 0.00 - 13.40 %    Eosinophils 0.30 0.00 - 6.90 %    Basophils 0.30 0.00 - 1.80 %    Immature Granulocytes 0.30 0.00 - 0.90 %    Nucleated RBC 0.00 0.00 - 0.20 /100 WBC    Neutrophils (Absolute) 13.09 (H) 1.82 - 7.42 K/uL    Lymphs (Absolute) 0.69 (L) 1.00 - 4.80 K/uL    Monos (Absolute) 0.50 0.00 - 0.85 K/uL    Eos (Absolute) 0.04 0.00 - 0.51 K/uL    Baso (Absolute) 0.05 0.00 - 0.12 K/uL     Immature Granulocytes (abs) 0.04 0.00 - 0.11 K/uL    NRBC (Absolute) 0.00 K/uL   TROPONIN    Collection Time: 06/22/25  7:22 PM   Result Value Ref Range    Troponin T <6 6 - 19 ng/L   Comp Metabolic Panel    Collection Time: 06/22/25  7:22 PM   Result Value Ref Range    Sodium 139 135 - 145 mmol/L    Potassium 3.7 3.6 - 5.5 mmol/L    Chloride 105 96 - 112 mmol/L    Co2 19 (L) 20 - 33 mmol/L    Anion Gap 15.0 7.0 - 16.0    Glucose 117 (H) 65 - 99 mg/dL    Bun 14 8 - 22 mg/dL    Creatinine 0.89 0.50 - 1.40 mg/dL    Calcium 8.7 8.5 - 10.5 mg/dL    Correct Calcium 8.8 8.5 - 10.5 mg/dL    AST(SGOT) 32 12 - 45 U/L    ALT(SGPT) 24 2 - 50 U/L    Alkaline Phosphatase 102 (H) 30 - 99 U/L    Total Bilirubin 1.0 0.1 - 1.5 mg/dL    Albumin 3.9 3.2 - 4.9 g/dL    Total Protein 7.0 6.0 - 8.2 g/dL    Globulin 3.1 1.9 - 3.5 g/dL    A-G Ratio 1.3 g/dL   proBrain Natriuretic Peptide, NT    Collection Time: 06/22/25  7:22 PM   Result Value Ref Range    NT-proBNP <36 0 - 125 pg/mL   ESTIMATED GFR    Collection Time: 06/22/25  7:22 PM   Result Value Ref Range    GFR (CKD-EPI) 102 >60 mL/min/1.73 m 2   CBC without Differential    Collection Time: 06/23/25  1:11 AM   Result Value Ref Range    WBC 10.5 4.8 - 10.8 K/uL    RBC 4.95 4.70 - 6.10 M/uL    Hemoglobin 15.4 14.0 - 18.0 g/dL    Hematocrit 44.5 42.0 - 52.0 %    MCV 89.9 81.4 - 97.8 fL    MCH 31.1 27.0 - 33.0 pg    MCHC 34.6 32.3 - 36.5 g/dL    RDW 43.6 35.9 - 50.0 fL    Platelet Count 224 164 - 446 K/uL    MPV 9.1 9.0 - 12.9 fL   Comp Metabolic Panel (CMP)    Collection Time: 06/23/25  1:11 AM   Result Value Ref Range    Sodium 138 135 - 145 mmol/L    Potassium 3.8 3.6 - 5.5 mmol/L    Chloride 105 96 - 112 mmol/L    Co2 20 20 - 33 mmol/L    Anion Gap 13.0 7.0 - 16.0    Glucose 115 (H) 65 - 99 mg/dL    Bun 19 8 - 22 mg/dL    Creatinine 0.89 0.50 - 1.40 mg/dL    Calcium 8.1 (L) 8.5 - 10.5 mg/dL    Correct Calcium 8.7 8.5 - 10.5 mg/dL    AST(SGOT) 32 12 - 45 U/L    ALT(SGPT) 23 2 - 50  U/L    Alkaline Phosphatase 80 30 - 99 U/L    Total Bilirubin 1.4 0.1 - 1.5 mg/dL    Albumin 3.3 3.2 - 4.9 g/dL    Total Protein 5.8 (L) 6.0 - 8.2 g/dL    Globulin 2.5 1.9 - 3.5 g/dL    A-G Ratio 1.3 g/dL   Magnesium    Collection Time: 25  1:11 AM   Result Value Ref Range    Magnesium 1.6 1.5 - 2.5 mg/dL   ESTIMATED GFR    Collection Time: 25  1:11 AM   Result Value Ref Range    GFR (CKD-EPI) 102 >60 mL/min/1.73 m 2   EKG    Collection Time: 25  6:01 AM   Result Value Ref Range    Report       [facility]    Test Date:  2025  Pt Name:    ASTON MARTINEZ               Department: [facility]  MRN:        [id number]                      Room:       [facility]  Gender:     Male                         Technician: [id number]  :        1970                   Requested By:AIMEE JOSE  Order #:    [id number]                    Reading MD: Demarco Rhoades MD    Measurements  Intervals                                Axis  Rate:       105                          P:          59  TX:         151                          QRS:        -22  QRSD:       92                           T:          45  QT:         319  QTc:        422    Interpretive Statements  Sinus tachycardia  Borderline left axis deviation  Low voltage, precordial leads  Compared to ECG 2022 06:55:02  Low QRS voltage now present  Sinus rhythm no longer present  Electronically Signed On 2025 06:01:35 PDT by Demarco Rhoades MD       Medical Decision Making, by Problem:  Active Hospital Problems    Diagnosis     Colon cancer (HCC) [C18.9]      Priority: Low    Bowel obstruction (HCC) [K56.609]     Dehydration [E86.0]     Leukocytosis [D72.829]     Tachycardia [R00.0]     Metabolic acidosis [E87.20]     Hypomagnesemia [E83.42]      Plan:  Given the clinical picture of no symptoms, I would recommend trying a diet and then ultimate discharge    If this fails, he will need an upper GI and small bowel series    Quality  Measures:  Quality-Core Measures    Discussed patient condition with

## 2025-06-23 NOTE — PROGRESS NOTES
"Hospital Medicine Daily Progress Note    Date of Service  6/23/2025    Chief Complaint  Beau Mcintyre is a 54 y.o. male admitted 6/22/2025 with abdominal pain.    Hospital Course  Per chart review:  \"54 y.o. male past medical history of history of colon cancer s/p bowel resection and colostomy placed 2022  who presented 6/22/2025 as a direct admission after presenting to the transferring facility with abdominal pain and distention.  Patient condition discussed with on-call surgery who will evaluate the patient after arrival.  I evaluated the patient at bedside.  He has abdominal pain and distention.  He has not had a bowel movement or passed gas since this morning.  He has severe nausea but has not vomited.\"    Interval Problem Update  6/23: Patient seen at bedside this morning.  On admission there was concern of bowel obstruction, this seems to have improved.  As per general surgery okay to start diet.  They recommended ruling out C. difficile.  Which is pending.  Continue to monitor closely.  Continue IV fluids for now and discontinue once the patient is able to tolerate p.o. appropriately.    I have discussed this patient's plan of care and discharge plan at IDT rounds today with Case Management, Nursing, Nursing leadership, and other members of the IDT team.    Consultants/Specialty  general surgery    Code Status  Full Code    Disposition  The patient is not medically cleared for discharge to home or a post-acute facility.        Review of Systems  Review of Systems   Constitutional:  Positive for malaise/fatigue. Negative for chills and fever.   HENT:  Negative for hearing loss and nosebleeds.    Eyes:  Negative for blurred vision and double vision.   Respiratory:  Negative for cough and shortness of breath.    Cardiovascular:  Negative for chest pain and palpitations.   Gastrointestinal:  Positive for abdominal pain and nausea. Negative for heartburn.   Genitourinary:  Negative for dysuria and urgency. "   Musculoskeletal:  Negative for back pain and falls.   Skin:  Negative for itching and rash.   Neurological:  Negative for dizziness and headaches.   Psychiatric/Behavioral:  Negative for substance abuse. The patient is not nervous/anxious.    All other systems reviewed and are negative.       Physical Exam  Temp:  [36.4 °C (97.6 °F)-38.3 °C (100.9 °F)] 36.4 °C (97.6 °F)  Pulse:  [101-104] 104  Resp:  [19] 19  BP: ()/(58-78) 91/58  SpO2:  [90 %-91 %] 90 %    Physical Exam  Vitals and nursing note reviewed.   Constitutional:       Appearance: He is ill-appearing.   HENT:      Head: Normocephalic and atraumatic.      Right Ear: External ear normal.      Left Ear: External ear normal.      Nose: Nose normal.      Mouth/Throat:      Mouth: Mucous membranes are moist.      Pharynx: Oropharynx is clear.   Eyes:      General:         Right eye: No discharge.         Left eye: No discharge.      Extraocular Movements: Extraocular movements intact.      Pupils: Pupils are equal, round, and reactive to light.   Cardiovascular:      Rate and Rhythm: Normal rate and regular rhythm.      Heart sounds: No murmur heard.  Pulmonary:      Effort: Pulmonary effort is normal. No respiratory distress.      Breath sounds: Normal breath sounds.   Abdominal:      Palpations: Abdomen is soft.      Tenderness: There is no abdominal tenderness.      Comments: Ostomy bag in place   Musculoskeletal:      Cervical back: Normal range of motion and neck supple.      Right lower leg: No edema.      Left lower leg: No edema.   Skin:     General: Skin is warm.   Neurological:      General: No focal deficit present.      Mental Status: He is alert and oriented to person, place, and time.   Psychiatric:         Mood and Affect: Mood normal.         Behavior: Behavior normal.         Fluids    Intake/Output Summary (Last 24 hours) at 6/23/2025 1023  Last data filed at 6/23/2025 1010  Gross per 24 hour   Intake 300 ml   Output 150 ml   Net 150 ml         Laboratory  Recent Labs     06/22/25 1922 06/23/25  0111   WBC 14.4* 10.5   RBC 5.40 4.95   HEMOGLOBIN 17.2 15.4   HEMATOCRIT 48.1 44.5   MCV 89.1 89.9   MCH 31.9 31.1   MCHC 35.8 34.6   RDW 42.3 43.6   PLATELETCT 231 224   MPV 9.0 9.1     Recent Labs     06/22/25 1922 06/23/25  0111   SODIUM 139 138   POTASSIUM 3.7 3.8   CHLORIDE 105 105   CO2 19* 20   GLUCOSE 117* 115*   BUN 14 19   CREATININE 0.89 0.89   CALCIUM 8.7 8.1*                   Imaging  CT-OUTSIDE IMAGES-CT ABDOMEN/PELVIS   Final Result           Assessment/Plan  * Bowel obstruction (HCC)- (present on admission)  Assessment & Plan  This might have resolved.  General surgery recommending advancing diet.  We appreciate further commendations.    Metabolic acidosis- (present on admission)  Assessment & Plan  Continue IV fluids.  Improved.    Tachycardia- (present on admission)  Assessment & Plan  Likely secondary to dehydration.    Continue IVF for now    Leukocytosis- (present on admission)  Assessment & Plan  General surgery recommending C. difficile rule out.  Monitor    Dehydration- (present on admission)  Assessment & Plan  Continue IV fluids for now.  Advance diet as tolerated.    Hypomagnesemia- (present on admission)  Assessment & Plan  Replace as needed  monitor    Colon cancer (HCC)- (present on admission)  Assessment & Plan  History of.      Advance care planning  Assessment & Plan  6/23: I discussed with patient for at least 16 minutes further goals of care.  This included CODE STATUS which includes full code and DNR/DNI.  The patient would like to be full code.  We also discussed further treatment goals.  For now the patient like to have full treatment options available.  This includes invasive or noninvasive procedures as needed.    Tobacco abuse counseling  Assessment & Plan  6/23: I discussed with patient for at least 11 minutes tobacco abuse counseling.  The patient states that he has been chewing tobacco since the 1990s,  sometimes smoking but mostly chewing tobacco.  He does not wish nicotine replacement therapy for now.  We discussed the benefits of quitting, he understands.  We discussed ways to help him quit including pharmacological options.  He says that he will follow-up with his PCP regarding these.         VTE prophylaxis: heparin    I have performed a physical exam and reviewed and updated ROS and Plan today (6/23/2025). In review of yesterday's note (6/22/2025), there are no changes except as documented above.      I spend at least 51 minutes providing care for this patient.  This included face-to-face interview, physical examination.  Review of lab work including CBC, CMP, magnesium.  Discussing with multidisciplinary team including case management, nursing staff and pharmacy.  Creating plan of care, reviewing orders.    Moreover, I discussed with patient for at least 16 minutes further goals of care.  This included CODE STATUS which includes full code and DNR/DNI.  The patient would like to be full code.  We also discussed further treatment goals.  For now the patient like to have full treatment options available.  This includes invasive or noninvasive procedures as needed.    Moreover,  I discussed with patient for at least 11 minutes tobacco abuse counseling.  The patient states that he has been chewing tobacco since the 1990s, sometimes smoking but mostly chewing tobacco.  He does not wish nicotine replacement therapy for now.  We discussed the benefits of quitting, he understands.  We discussed ways to help him quit including pharmacological options.  He says that he will follow-up with his PCP regarding these.

## 2025-06-23 NOTE — PROGRESS NOTES
Received bedside report from night shift RN.  Assumed pt care.   Assessment and chart check complete.  Pt is AA0X4, respirations even and unlabored, no signs of distress, denies nausea/vomiting.  Updated for the POC of the day.  IVF infusing at 83 ml/hr.  Fall precautions in place, treaded socks on pt, bed in low position.   Call light within reach.   Educated pt to call if needing anything.   Hourly rounding.

## 2025-06-23 NOTE — PROGRESS NOTES
4 Eyes Skin Assessment Completed by Randy RN and Irma RN.    Skin assessment is primarily focused on high risk bony prominences. Pay special attention to skin beneath and around medical devices, high risk bony prominences, skin to skin areas and areas where the patient lacks sensation to feel pain and areas where the patient previously had breakdown.     Head (Occipital):  WDL   Ears (Under Medical Devices): WDL   Nose (Under Medical Devices): WDL   Mouth:  WDL   Neck: WDL   Breast/Chest:  WDL   Shoulder Blades:  WDL   Spine:   WDL   (R) Arm/Elbow/Hand: WDL   (L) Arm/Elbow/Hand: WDL   Abdomen: ostomy bag in place   Pannus/Groin:  WDL   Sacrum/Coccyx:   WDL   (R) Ischial Tuberosity (Sit Bones):  WDL   (L) Ischial Tuberosity (Sit Bones):  WDL   (R) Leg:  Scab   (L) Leg:  Scab   (R) Heel:  WDL   (R) Foot/Toe: WDL   (L) Heel: WDL   (L) Foot/Toe:  WDL       DEVICES IN USE:   Respiratory Devices:  NA, patient on room air, Nasal cannula, and Pulse ox  Feeding Devices:  N/A   Lines & BP Monitoring Devices:  Peripheral IV, BP cuff, and Pulse ox    Orthopedic Devices:  N/A  Miscellaneous Devices:  Ostomy belt,ostomy bag in place.    PROTOCOL INTERVENTIONS:   Standard/Trauma Bed:  Already in place    WOUND PHOTOS:   N/A no wounds identified    WOUND CONSULT:   N/A, no advanced wound care needs identified

## 2025-06-23 NOTE — DISCHARGE PLANNING
Patient rolled back to observation/outpatient status per attending MD determination Willi Beatty and UR committee IPA secondary review, Slava Cheng. Patient status update completed. Voalte message sent to assigned CM to have form 2 delivered.

## 2025-06-23 NOTE — ASSESSMENT & PLAN NOTE
6/23: I discussed with patient for at least 11 minutes tobacco abuse counseling.  The patient states that he has been chewing tobacco since the 1990s, sometimes smoking but mostly chewing tobacco.  He does not wish nicotine replacement therapy for now.  We discussed the benefits of quitting, he understands.  We discussed ways to help him quit including pharmacological options.  He says that he will follow-up with his PCP regarding these.

## 2025-06-23 NOTE — CARE PLAN
The patient is Stable - Low risk of patient condition declining or worsening    Shift Goals  Clinical Goals: Manage pain at tolerable level, tolerate diet  Patient Goals: pain control, rest comfortably    Progress made toward(s) clinical / shift goals: Administered PRN medication per MAR for pain controlled.Tolerating diet, denies any nausea/vomiting.    Patient is not progressing towards the following goals:      Problem: Pain - Standard  Goal: Alleviation of pain or a reduction in pain to the patient’s comfort goal  Outcome: Progressing     Problem: Safety  Goal: Will remain free from injury  Outcome: Progressing     Problem: Gastrointestinal Irritability  Goal: Nausea and vomiting will be absent or improve  Outcome: Progressing

## 2025-06-23 NOTE — CONSULTS
DATE OF SERVICE:  06/23/2025     SURGICAL CONSULTATION     TIME:  0629 hours.     CHIEF COMPLAINT:  Consult for small bowel obstruction.     HISTORY:  A 54-year-old male is seen for possible small bowel obstruction.     In 2015, he had a total proctocolectomy and has a permanent ileostomy.     He states on a yearly basis, he developed some abdominal pain and is seen for   an alleged obstruction.     He has been cancer-free and has had no evidence for recurrence.     The patient had some abdominal pain with some nausea yesterday, but today is   pain-free and has gas and stool in his bag.     PAST MEDICAL HISTORY:  OPERATIONS:  Total proctocolectomy, laparoscopy, lysis of adhesions,   hydrocelectomy.     MEDICAL DISEASES:  History of smoking.     MEDICATIONS:  None.     ALLERGIES:  None.     FAMILY HISTORY:  Allegedly positive for colon cancer.     SOCIAL HISTORY:  The patient is a nonsmoker and works in mining.     REVIEW OF SYSTEMS:  I reviewed the 10-point AMA/CMS criteria from the chart.     PHYSICAL EXAMINATION:  VITAL SIGNS:  Temperature 99.5, blood pressure 118/78, pulse 104.  HEENT:  Without icterus.  NECK:  Without masses.  CHEST:  Coarse breath sounds.  CARDIAC:  Auscultation unremarkable.  ABDOMEN:  Scaphoid, midline scar, right lower quadrant stoma with bag with gas   and liquid stool.  EXTREMITIES:  2+ pulses.     IMPRESSION:  1.  Question transient small bowel obstruction - resolved.  2.  History of colon cancer with total proctocolectomy.  3.  History of smoking.     PLAN:  Under the circumstances, he looks quite good and I think a diet can be   started.     His white count is 14,000, but we have no events for any sort of infection.     We could check his stool for C. diff, but I think a diet and potential   discharge is in order.     If he fails the diet, I would recommend an upper GI and small bowel series.        ______________________________  MD KENZIE TRIANA/RAJ    DD:  06/23/2025  06:29  DT:  06/23/2025 06:53    Job#:  [id number]

## 2025-06-23 NOTE — PROGRESS NOTES
1851 Pt arrived via gurney, admitted to room 220 as DA.  Pt is A&Ox4, on RA, complains pain level of 5/10. Ostomy bag in place.Pt oriented to room and call light within place. Received report from dayshift DAVIDSON Landaverde.. POC reviewed with pt. Fall precautions in place, bed at lowest position. Hourly rounding in place.

## 2025-06-23 NOTE — H&P
Hospital Medicine History & Physical Note    Date of Service  6/22/2025    Primary Care Physician  Pcp Pt States None    Consultants  General Surgery,Dr Gomez      Code Status  Full Code    Chief Complaint   Abdominal distention and pain    History of Presenting Illness  Beau Mcnityre is a 54 y.o. male past medical history of history of colon cancer s/p bowel resection and colostomy placed 2022  who presented 6/22/2025 as a direct admission after presenting to the transferring facility with abdominal pain and distention.  Patient condition discussed with on-call surgery who will evaluate the patient after arrival.  I evaluated the patient at bedside.  He has abdominal pain and distention.  He has not had a bowel movement or passed gas since this morning.  He has severe nausea but has not vomited.    I discussed the plan of care with transferring physician, the patient, transfer center, and [facility] nursing staff.    Review of Systems  Review of Systems   Constitutional:  Positive for malaise/fatigue. Negative for chills and fever.   Eyes:  Negative for discharge and redness.   Respiratory:  Negative for cough, shortness of breath and stridor.    Cardiovascular:  Negative for chest pain and leg swelling.   Gastrointestinal:  Positive for abdominal pain and vomiting.   Genitourinary:  Negative for flank pain.   Musculoskeletal:  Negative for myalgias.   Skin: Negative.    Neurological:  Negative for focal weakness.   Endo/Heme/Allergies:  Does not bruise/bleed easily.   Psychiatric/Behavioral:  The patient is not nervous/anxious.      Past Medical History   has a past medical history of Cancer (HCC) (2003), Ileostomy in place (HCC), and Small bowel obstruction (HCC).    Surgical History   has a past surgical history that includes other abdominal surgery; hydrocelectomy adult; pr lap,diagnostic abdomen (5/31/2022); and laparoscopic lysis of adhesions (5/31/2022).     Family History  Family History   Problem  Relation Age of Onset    Colon Cancer Mother     Cancer Mother         Hepatocellular cancer      Social History   reports that he quit smoking about 30 years ago. His smoking use included cigarettes. His smokeless tobacco use includes chew. He reports that he does not currently use alcohol. He reports that he does not currently use drugs.    Allergies  Allergies[1]    Medications  None     Physical Exam  Temp:  [36.8 °C (98.2 °F)-37.5 °C (99.5 °F)] 37.5 °C (99.5 °F)  Pulse:  [104] 104  Resp:  [19] 19  BP: (118)/(78) 118/78  SpO2:  [91 %] 91 %                        Physical Exam  Constitutional:       General: He is not in acute distress.     Appearance: He is not ill-appearing or diaphoretic.   HENT:      Head: Atraumatic.      Right Ear: External ear normal.      Left Ear: External ear normal.      Nose: No congestion or rhinorrhea.      Mouth/Throat:      Mouth: Mucous membranes are dry.   Eyes:      General: No scleral icterus.        Right eye: No discharge.         Left eye: No discharge.      Pupils: Pupils are equal, round, and reactive to light.   Cardiovascular:      Rate and Rhythm: Regular rhythm. Tachycardia present.   Pulmonary:      Effort: Pulmonary effort is normal.      Comments: Saturating well on room air.  Reduced air entry bilaterally basally.  Is able to speak in full sentences  Abdominal:      General: There is distension.      Tenderness: There is abdominal tenderness. There is no guarding or rebound.   Musculoskeletal:      Cervical back: Neck supple. No rigidity. No muscular tenderness.      Right lower leg: No edema.      Left lower leg: No edema.   Skin:     General: Skin is dry.      Capillary Refill: Capillary refill takes 2 to 3 seconds.      Coloration: Skin is pale. Skin is not jaundiced.   Neurological:      Mental Status: He is alert and oriented to person, place, and time.      Coordination: Coordination normal.   Psychiatric:         Mood and Affect: Mood normal.          Behavior: Behavior normal.       Laboratory:  Recent Labs     06/22/25 1922   WBC 14.4*   RBC 5.40   HEMOGLOBIN 17.2   HEMATOCRIT 48.1   MCV 89.1   MCH 31.9   MCHC 35.8   RDW 42.3   PLATELETCT 231   MPV 9.0     Recent Labs     06/22/25 1922   SODIUM 139   POTASSIUM 3.7   CHLORIDE 105   CO2 19*   GLUCOSE 117*   BUN 14   CREATININE 0.89   CALCIUM 8.7     Recent Labs     06/22/25 1922   ALTSGPT 24   ASTSGOT 32   ALKPHOSPHAT 102*   TBILIRUBIN 1.0   GLUCOSE 117*         Recent Labs     06/22/25 1922   NTPROBNP <36         Recent Labs     06/22/25 1922   TROPONINT <6     Imaging:  CT-OUTSIDE IMAGES-CT ABDOMEN/PELVIS   Final Result        Assessment/Plan:  Justification for Admission Status  I anticipate this patient will require at least two midnights for appropriate medical management, necessitating inpatient admission because patient has bowel obstruction.  Will require intravenous fluids, n.p.o. status, NG tube with suction, GI consultation and possible need for surgical intervention.    Patient will need a Med/Surg bed on MEDICAL service.      * Bowel obstruction (HCC)- (present on admission)  Assessment & Plan  I will place on N.P.O. status for now   I will start Intravenous fluids, encourage ambulation  Monitor electrolytes and replace accordingly   I recommended placing NG tube to low intermittent suction, patient is currently declining    General surgery consulted Dr Gomez , will see tomorrow.    Metabolic acidosis- (present on admission)  Assessment & Plan  Likely due to reduced intravascular volume   I will start intravenous fluids    Anticipate improvement with intravenous fluids  Continue to monitor, I will order follow-up bicarb level        Tachycardia- (present on admission)  Assessment & Plan  Likely secondary to dehydration.  Anticipate improvement with intravenous fluids  I will check an EKG    Leukocytosis- (present on admission)  Assessment & Plan  Likely reactive secondary to dehydration and  stress reaction secondary to bowel obstruction  I will monitor off antibiotics for now   I will order follow-up CBC    Dehydration- (present on admission)  Assessment & Plan  Likely secondary to reduced oral intake    Encourage oral intake as tolerated, antiemetics as needed.  Intravenous hydration until adequate oral intake is achieved.        VTE prophylaxis: SCDs/TEDs and heparin ppx    Total time spent is 97 minutes.  This includes time for discussing with the transferring physician, transfer center, the patient and [facility] nursing staff..  Reviewing patient records from the transferring facility.  In addition to face-to-face interview, physical examination, lab review, lab analysis, and placing orders.           [1]   Allergies  Allergen Reactions    Hydromorphone Hives     Liquid form only per pt    Oxycodone Hives    Zosyn Rash and Swelling     Patient stated arm became red and swollen during infusion  Tolerates cephalosporins    Cantaloupe Itching     Throat itches    Tape Rash     Rash and blistering with prolonged exposure. Some tape causes immediate rash, burning. Paper tape OK per patient.

## 2025-06-23 NOTE — ASSESSMENT & PLAN NOTE
This might have resolved.  General surgery recommending advancing diet.  We appreciate further commendations.

## 2025-06-23 NOTE — ASSESSMENT & PLAN NOTE
6/23: I discussed with patient for at least 16 minutes further goals of care.  This included CODE STATUS which includes full code and DNR/DNI.  The patient would like to be full code.  We also discussed further treatment goals.  For now the patient like to have full treatment options available.  This includes invasive or noninvasive procedures as needed.

## 2025-06-23 NOTE — CARE PLAN
The patient is Stable - Low risk of patient condition declining or worsening    Shift Goals  Clinical Goals: Maintain NPO status. Monitor v/s and lab. Pain management. Free from falls during selena shift.  Patient Goals: rest and sleep    Progress made toward(s) clinical / shift goals:  NPO strict status maintained throughout the shift. Pt developed fever 100.9F, controlled by IV tylenol. PRN pain medication and PRN antiemesis medication given. Pt seen sleeping during the rounds.     Patient is not progressing towards the following goals:

## 2025-06-24 ASSESSMENT — PAIN DESCRIPTION - PAIN TYPE
TYPE: ACUTE PAIN
TYPE: ACUTE PAIN

## 2025-06-24 NOTE — PROGRESS NOTES
Received bedside report from night shift RN.  Assumed pt care.   Assessment and chart check complete.  Pt is AA0X4, respirations even and unlabored, no signs of distress, denies nausea/vomiting.  Updated for the POC of the day.  Ileostomy bag in place.  Fall precautions in place, treaded socks on pt, bed in low position.   Call light within reach.   Educated pt to call if needing anything.   Hourly rounding.

## 2025-06-24 NOTE — PROGRESS NOTES
Received report from Che CEDEÑO, at pt bedside. Pt reported no pain at this time. POC discussed, pt agreeable. Call light and belongings within reach. Bed locked and in low position. Fall precautions in place, non-skid socks.

## 2025-06-24 NOTE — CARE PLAN
The patient is Stable - Low risk of patient condition declining or worsening    Shift Goals  Clinical Goals: Monitor vital signs and labs, pain management, monitor ostomy output  Patient Goals: Rest, Go home tomorrow    Progress made toward(s) clinical / shift goals:    Problem: Pain - Standard  Goal: Alleviation of pain or a reduction in pain to the patient’s comfort goal  Outcome: Progressing  Note: Discussed pain management regimen with pt, medicated pt per MAR, pt reported interventions to effectively manage pain.      Problem: Bowel/Gastric:  Goal: Normal bowel function is maintained or improved  Outcome: Progressing  Note: Ileostomy site assessed, BM assessed in bag, hyperactive bowel sounds heard.      Problem: Pain Management  Goal: Pain level will decrease to patient's comfort goal  Outcome: Progressing     Problem: Gastrointestinal Irritability  Goal: Nausea and vomiting will be absent or improve  Outcome: Progressing       Patient is not progressing towards the following goals:

## 2025-06-24 NOTE — PROGRESS NOTES
Discharging patient home per MD order. Pt demonstrated understanding of discharge instructions, follow up appointments, home medications, prescriptions.  Pt is voiding without difficulty, pain well controlled, tolerating oral medications, O2 greater than 90%.Pt verbalized understanding of discharge instructions and educational handouts and all questions answered. PIV removed. Pt discharged off unit with hospital escort.

## 2025-06-24 NOTE — DISCHARGE SUMMARY
"Discharge Summary    CHIEF COMPLAINT ON ADMISSION  No chief complaint on file.      Reason for Admission  General Surgery/ACS ([facility])     Admission Date  6/22/2025    CODE STATUS  Full Code    HPI & HOSPITAL COURSE  Per chart review:  \"54 y.o. male past medical history of history of colon cancer s/p bowel resection and colostomy placed 2022  who presented 6/22/2025 as a direct admission after presenting to the transferring facility with abdominal pain and distention.  Patient condition discussed with on-call surgery who will evaluate the patient after arrival.  I evaluated the patient at bedside.  He has abdominal pain and distention.  He has not had a bowel movement or passed gas since this morning.  He has severe nausea but has not vomited.\"    Patient was admitted for further management and care.  On admission the patient did have a fever, unfortunately there were no blood cultures collected on the day of admission.  I did collect blood cultures today prior to discharge however I do not suspect an infectious cause at this time.  Initially the patient was constipated which could have caused some fever.    General surgery was consulted for evaluation, they recommended to rule out C. difficile which we did.  C. difficile was negative.  And the patient eventually started having bowel movements, today on the day of discharge the patient feels back to his self, currently not complaining of pain or other symptom.  As per general surgery the patient can be discharged home.  The patient is tolerating p.o.    The patient will require close follow-up with PCP and general surgery as an outpatient.  Follow-up on blood cultures however I do not suspect infectious component at this time.  Will not discharge patient on antibiotics as the patient is symptom-free at the time of evaluation today.    Therefore, he is discharged in fair and stable condition to home with close outpatient follow-up.    The patient met 2-midnight criteria " for an inpatient stay at the time of discharge.    Discharge Date  06/24/2025    FOLLOW UP ITEMS POST DISCHARGE  The patient will require close follow-up with PCP and general surgery as an outpatient.  Follow-up on blood cultures drawn before discharge.    DISCHARGE DIAGNOSES  Principal Problem:    Bowel obstruction (HCC) (POA: Yes)  Active Problems:    Colon cancer (HCC) (POA: Yes)    Hypomagnesemia (POA: Yes)    Dehydration (POA: Yes)    Leukocytosis (POA: Yes)    Tachycardia (POA: Yes)    Metabolic acidosis (POA: Yes)    Tobacco abuse counseling (POA: Unknown)    Advance care planning (POA: Unknown)  Resolved Problems:    * No resolved hospital problems. *      FOLLOW UP    Primary Care Provider    Schedule an appointment as soon as possible for a visit      Romaine Gomez M.D.  [address]  [place] [address]  [phone]    Schedule an appointment as soon as possible for a visit        MEDICATIONS ON DISCHARGE     Medication List        START taking these medications        Instructions   acetaminophen 325 MG Tabs  Commonly known as: Tylenol   Take 2 Tablets by mouth every 6 hours as needed for Mild Pain.  Dose: 650 mg            CONTINUE taking these medications        Instructions   vitamin D2 1.25 MG (07371 UT) Caps capsule  Commonly known as: Ergocalciferol   Take 50,000 Units by mouth every 7 days.  Dose: 50,000 Units              Allergies  Allergies[1]    DIET  Orders Placed This Encounter   Procedures    Diet Order Diet: Regular (lunch)     Standing Status:   Standing     Number of Occurrences:   1     Diet::   Regular [1]              lunch       ACTIVITY  As tolerated.  Weight bearing as tolerated    CONSULTATIONS  General Surgery    PROCEDURES      CT-OUTSIDE IMAGES-CT ABDOMEN/PELVIS   Final Result           LABORATORY  Lab Results   Component Value Date    SODIUM 135 06/24/2025    POTASSIUM 3.5 (L) 06/24/2025    CHLORIDE 101 06/24/2025    CO2 25 06/24/2025    GLUCOSE 98  06/24/2025    BUN 12 06/24/2025    CREATININE 0.84 06/24/2025        Lab Results   Component Value Date    WBC 7.6 06/24/2025    HEMOGLOBIN 14.7 06/24/2025    HEMATOCRIT 42.8 06/24/2025    PLATELETCT 180 06/24/2025        Total time of the discharge process exceeds 31 minutes.       [1]   Allergies  Allergen Reactions    Oxycodone      No allergy to oxycodone    Cantaloupe Itching     Throat itches    Hydromorphone Hives     Liquid form only per pt    Tape Rash     Rash and blistering with prolonged exposure. Some tape causes immediate rash, burning. Paper tape OK per patient. Allergy to plastic tapes.    Zosyn Rash and Swelling     Patient stated arm became red and swollen during infusion  Tolerates cephalosporins

## 2025-06-24 NOTE — CARE PLAN
The patient is Stable - Low risk of patient condition declining or worsening    Shift Goals  Clinical Goals: Manage pain at tolerable level throughout the shift, tolerate diet  Patient Goals: discharge, pain control    Progress made toward(s) clinical / shift goals:  Pain has been controlled throughout the shift. Tolerating current diet, denies any nausea/vomiting.    Patient is not progressing towards the following goals:      Problem: Pain - Standard  Goal: Alleviation of pain or a reduction in pain to the patient’s comfort goal  Outcome: Progressing     Problem: Safety  Goal: Will remain free from injury  Outcome: Progressing     Problem: Bowel/Gastric:  Goal: Normal bowel function is maintained or improved  Outcome: Progressing     Problem: Discharge Barriers/Planning  Goal: Patient's continuum of care needs will be met  Outcome: Progressing     Problem: Gastrointestinal Irritability  Goal: Nausea and vomiting will be absent or improve  Outcome: Progressing

## 2025-06-24 NOTE — PROGRESS NOTES
Surgical Progress Note    Author: Romaine Gomez M.D. Date & Time created: 2025   5:33 AM     Interval Events:  No abdominal pain.  Stoma working  ROS  Hemodynamics:  Temp (24hrs), Av °C (98.6 °F), Min:36.8 °C (98.3 °F), Max:37.1 °C (98.8 °F)  Temperature: 36.8 °C (98.3 °F)  Pulse  Av.6  Min: 82  Max: 104   Blood Pressure: 107/67     Respiratory:    Respiration: 18, Pulse Oximetry: 93 %           Neuro:  GCS       Fluids:    Intake/Output Summary (Last 24 hours) at 2025 0533  Last data filed at 2025  Gross per 24 hour   Intake 640 ml   Output 450 ml   Net 190 ml        Current Diet Order   Procedures    Diet Order Diet: Regular (lunch)     Physical Exam  Labs:  Recent Results (from the past 24 hours)   EKG    Collection Time: 25  6:01 AM   Result Value Ref Range    Report       [facility]    Test Date:  2025  Pt Name:    ASTON MARTINEZ               Department: [facility]  MRN:        [id number]                      Room:       [facility]  Gender:     Male                         Technician: [id number]  :        1970                   Requested By:AIMEE JOSE  Order #:    [id number]                    Reading MD: Demarco Rhoades MD    Measurements  Intervals                                Axis  Rate:       105                          P:          59  MD:         151                          QRS:        -22  QRSD:       92                           T:          45  QT:         319  QTc:        422    Interpretive Statements  Sinus tachycardia  Borderline left axis deviation  Low voltage, precordial leads  Compared to ECG 2022 06:55:02  Low QRS voltage now present  Sinus rhythm no longer present  Electronically Signed On 2025 06:01:35 PDT by Demarco Rhoades MD     Cdiff By PCR Rflx Toxin    Collection Time: 25 10:07 AM    Specimen: Stool   Result Value Ref Range    C Diff by PCR Negative Negative   CBC WITHOUT DIFFERENTIAL    Collection Time: 25 12:49  AM   Result Value Ref Range    WBC 7.6 4.8 - 10.8 K/uL    RBC 4.30 (L) 4.70 - 6.10 M/uL    Hemoglobin 13.5 (L) 14.0 - 18.0 g/dL    Hematocrit 39.3 (L) 42.0 - 52.0 %    MCV 91.4 81.4 - 97.8 fL    MCH 31.4 27.0 - 33.0 pg    MCHC 34.4 32.3 - 36.5 g/dL    RDW 44.7 35.9 - 50.0 fL    Platelet Count 180 164 - 446 K/uL    MPV 9.2 9.0 - 12.9 fL   Comp Metabolic Panel    Collection Time: 06/24/25 12:49 AM   Result Value Ref Range    Sodium 135 135 - 145 mmol/L    Potassium 3.5 (L) 3.6 - 5.5 mmol/L    Chloride 101 96 - 112 mmol/L    Co2 25 20 - 33 mmol/L    Anion Gap 9.0 7.0 - 16.0    Glucose 98 65 - 99 mg/dL    Bun 12 8 - 22 mg/dL    Creatinine 0.84 0.50 - 1.40 mg/dL    Calcium 8.1 (L) 8.5 - 10.5 mg/dL    Correct Calcium 8.8 8.5 - 10.5 mg/dL    AST(SGOT) 26 12 - 45 U/L    ALT(SGPT) 23 2 - 50 U/L    Alkaline Phosphatase 75 30 - 99 U/L    Total Bilirubin 1.4 0.1 - 1.5 mg/dL    Albumin 3.1 (L) 3.2 - 4.9 g/dL    Total Protein 5.7 (L) 6.0 - 8.2 g/dL    Globulin 2.6 1.9 - 3.5 g/dL    A-G Ratio 1.2 g/dL   MAGNESIUM    Collection Time: 06/24/25 12:49 AM   Result Value Ref Range    Magnesium 2.2 1.5 - 2.5 mg/dL   ESTIMATED GFR    Collection Time: 06/24/25 12:49 AM   Result Value Ref Range    GFR (CKD-EPI) 104 >60 mL/min/1.73 m 2     Medical Decision Making, by Problem:  Active Hospital Problems    Diagnosis     Colon cancer (HCC) [C18.9]      Priority: Low    Tobacco abuse counseling [Z71.6]     Advance care planning [Z71.89]     Bowel obstruction (HCC) [K56.609]     Dehydration [E86.0]     Leukocytosis [D72.829]     Tachycardia [R00.0]     Metabolic acidosis [E87.20]     Hypomagnesemia [E83.42]      Plan:  Okay for discharge to as needed follow-up from my point of view    Quality Measures:  Quality-Core Measures    Discussed patient condition with

## 2025-07-15 ENCOUNTER — APPOINTMENT (OUTPATIENT)
Dept: INTERNAL MEDICINE | Facility: OTHER | Age: 55
End: 2025-07-15
Attending: INTERNAL MEDICINE
Payer: COMMERCIAL

## 2025-08-03 ENCOUNTER — APPOINTMENT (OUTPATIENT)
Dept: RADIOLOGY | Facility: MEDICAL CENTER | Age: 55
End: 2025-08-03
Payer: COMMERCIAL

## 2025-08-03 ENCOUNTER — DOCUMENTATION (OUTPATIENT)
Dept: HOSPITALIST | Facility: MEDICAL CENTER | Age: 55
End: 2025-08-03
Payer: COMMERCIAL

## 2025-08-04 ENCOUNTER — HOSPITAL ENCOUNTER (INPATIENT)
Facility: MEDICAL CENTER | Age: 55
LOS: 1 days | DRG: 390 | End: 2025-08-05
Attending: STUDENT IN AN ORGANIZED HEALTH CARE EDUCATION/TRAINING PROGRAM | Admitting: STUDENT IN AN ORGANIZED HEALTH CARE EDUCATION/TRAINING PROGRAM
Payer: COMMERCIAL

## 2025-08-04 PROBLEM — K56.609 SMALL BOWEL OBSTRUCTION (HCC): Status: ACTIVE | Noted: 2025-08-04

## 2025-08-04 LAB
ANION GAP SERPL CALC-SCNC: 13 MMOL/L (ref 7–16)
BUN SERPL-MCNC: 17 MG/DL (ref 8–22)
CALCIUM SERPL-MCNC: 8.5 MG/DL (ref 8.5–10.5)
CHLORIDE SERPL-SCNC: 104 MMOL/L (ref 96–112)
CO2 SERPL-SCNC: 20 MMOL/L (ref 20–33)
CREAT SERPL-MCNC: 0.97 MG/DL (ref 0.5–1.4)
ERYTHROCYTE [DISTWIDTH] IN BLOOD BY AUTOMATED COUNT: 41.7 FL (ref 35.9–50)
GFR SERPLBLD CREATININE-BSD FMLA CKD-EPI: 93 ML/MIN/1.73 M 2
GLUCOSE SERPL-MCNC: 108 MG/DL (ref 65–99)
HCT VFR BLD AUTO: 45.8 % (ref 42–52)
HGB BLD-MCNC: 16.1 G/DL (ref 14–18)
MAGNESIUM SERPL-MCNC: 1.8 MG/DL (ref 1.5–2.5)
MCH RBC QN AUTO: 31.4 PG (ref 27–33)
MCHC RBC AUTO-ENTMCNC: 35.2 G/DL (ref 32.3–36.5)
MCV RBC AUTO: 89.5 FL (ref 81.4–97.8)
PLATELET # BLD AUTO: 212 K/UL (ref 164–446)
PMV BLD AUTO: 9.2 FL (ref 9–12.9)
POTASSIUM SERPL-SCNC: 4.2 MMOL/L (ref 3.6–5.5)
RBC # BLD AUTO: 5.12 M/UL (ref 4.7–6.1)
SODIUM SERPL-SCNC: 137 MMOL/L (ref 135–145)
WBC # BLD AUTO: 16.1 K/UL (ref 4.8–10.8)

## 2025-08-04 PROCEDURE — 80048 BASIC METABOLIC PNL TOTAL CA: CPT

## 2025-08-04 PROCEDURE — 94760 N-INVAS EAR/PLS OXIMETRY 1: CPT

## 2025-08-04 PROCEDURE — 83735 ASSAY OF MAGNESIUM: CPT

## 2025-08-04 PROCEDURE — 99223 1ST HOSP IP/OBS HIGH 75: CPT | Performed by: STUDENT IN AN ORGANIZED HEALTH CARE EDUCATION/TRAINING PROGRAM

## 2025-08-04 PROCEDURE — 700105 HCHG RX REV CODE 258: Performed by: STUDENT IN AN ORGANIZED HEALTH CARE EDUCATION/TRAINING PROGRAM

## 2025-08-04 PROCEDURE — 700111 HCHG RX REV CODE 636 W/ 250 OVERRIDE (IP): Performed by: STUDENT IN AN ORGANIZED HEALTH CARE EDUCATION/TRAINING PROGRAM

## 2025-08-04 PROCEDURE — 770001 HCHG ROOM/CARE - MED/SURG/GYN PRIV*

## 2025-08-04 PROCEDURE — 36415 COLL VENOUS BLD VENIPUNCTURE: CPT

## 2025-08-04 PROCEDURE — 85027 COMPLETE CBC AUTOMATED: CPT

## 2025-08-04 RX ORDER — ONDANSETRON 4 MG/1
4 TABLET, ORALLY DISINTEGRATING ORAL EVERY 4 HOURS PRN
Status: DISCONTINUED | OUTPATIENT
Start: 2025-08-04 | End: 2025-08-05 | Stop reason: HOSPADM

## 2025-08-04 RX ORDER — MORPHINE SULFATE 4 MG/ML
2 INJECTION INTRAVENOUS
Status: DISCONTINUED | OUTPATIENT
Start: 2025-08-04 | End: 2025-08-05 | Stop reason: HOSPADM

## 2025-08-04 RX ORDER — PANTOPRAZOLE SODIUM 40 MG/10ML
40 INJECTION, POWDER, LYOPHILIZED, FOR SOLUTION INTRAVENOUS DAILY
Status: DISCONTINUED | OUTPATIENT
Start: 2025-08-04 | End: 2025-08-05 | Stop reason: HOSPADM

## 2025-08-04 RX ORDER — ACETAMINOPHEN 500 MG
1000 TABLET ORAL EVERY 6 HOURS PRN
COMMUNITY

## 2025-08-04 RX ORDER — SODIUM CHLORIDE, SODIUM LACTATE, POTASSIUM CHLORIDE, CALCIUM CHLORIDE 600; 310; 30; 20 MG/100ML; MG/100ML; MG/100ML; MG/100ML
INJECTION, SOLUTION INTRAVENOUS CONTINUOUS
Status: DISCONTINUED | OUTPATIENT
Start: 2025-08-04 | End: 2025-08-05

## 2025-08-04 RX ORDER — KETOROLAC TROMETHAMINE 15 MG/ML
15 INJECTION, SOLUTION INTRAMUSCULAR; INTRAVENOUS EVERY 6 HOURS PRN
Status: DISCONTINUED | OUTPATIENT
Start: 2025-08-04 | End: 2025-08-05 | Stop reason: HOSPADM

## 2025-08-04 RX ORDER — MORPHINE SULFATE 4 MG/ML
2 INJECTION INTRAVENOUS
Status: DISCONTINUED | OUTPATIENT
Start: 2025-08-04 | End: 2025-08-04

## 2025-08-04 RX ORDER — ERGOCALCIFEROL 1.25 MG/1
50000 CAPSULE, LIQUID FILLED ORAL
COMMUNITY

## 2025-08-04 RX ORDER — ONDANSETRON 2 MG/ML
4 INJECTION INTRAMUSCULAR; INTRAVENOUS EVERY 4 HOURS PRN
Status: DISCONTINUED | OUTPATIENT
Start: 2025-08-04 | End: 2025-08-05 | Stop reason: HOSPADM

## 2025-08-04 RX ORDER — PROCHLORPERAZINE EDISYLATE 5 MG/ML
10 INJECTION INTRAMUSCULAR; INTRAVENOUS EVERY 6 HOURS PRN
Status: DISCONTINUED | OUTPATIENT
Start: 2025-08-04 | End: 2025-08-05 | Stop reason: HOSPADM

## 2025-08-04 RX ORDER — ENOXAPARIN SODIUM 100 MG/ML
40 INJECTION SUBCUTANEOUS DAILY
Status: DISCONTINUED | OUTPATIENT
Start: 2025-08-04 | End: 2025-08-05 | Stop reason: HOSPADM

## 2025-08-04 RX ADMIN — PANTOPRAZOLE SODIUM 40 MG: 40 INJECTION, POWDER, FOR SOLUTION INTRAVENOUS at 05:13

## 2025-08-04 RX ADMIN — ENOXAPARIN SODIUM 40 MG: 100 INJECTION SUBCUTANEOUS at 17:29

## 2025-08-04 RX ADMIN — ONDANSETRON 4 MG: 2 INJECTION INTRAMUSCULAR; INTRAVENOUS at 03:24

## 2025-08-04 RX ADMIN — KETOROLAC TROMETHAMINE 15 MG: 15 INJECTION, SOLUTION INTRAMUSCULAR; INTRAVENOUS at 12:15

## 2025-08-04 RX ADMIN — KETOROLAC TROMETHAMINE 15 MG: 15 INJECTION, SOLUTION INTRAMUSCULAR; INTRAVENOUS at 19:54

## 2025-08-04 RX ADMIN — SODIUM CHLORIDE, POTASSIUM CHLORIDE, SODIUM LACTATE AND CALCIUM CHLORIDE: 600; 310; 30; 20 INJECTION, SOLUTION INTRAVENOUS at 13:55

## 2025-08-04 RX ADMIN — MORPHINE SULFATE 2 MG: 4 INJECTION INTRAVENOUS at 03:39

## 2025-08-04 RX ADMIN — KETOROLAC TROMETHAMINE 15 MG: 15 INJECTION, SOLUTION INTRAMUSCULAR; INTRAVENOUS at 05:13

## 2025-08-04 RX ADMIN — SODIUM CHLORIDE, POTASSIUM CHLORIDE, SODIUM LACTATE AND CALCIUM CHLORIDE: 600; 310; 30; 20 INJECTION, SOLUTION INTRAVENOUS at 03:28

## 2025-08-04 ASSESSMENT — PATIENT HEALTH QUESTIONNAIRE - PHQ9
1. LITTLE INTEREST OR PLEASURE IN DOING THINGS: NOT AT ALL
2. FEELING DOWN, DEPRESSED, IRRITABLE, OR HOPELESS: NOT AT ALL
SUM OF ALL RESPONSES TO PHQ9 QUESTIONS 1 AND 2: 0

## 2025-08-04 ASSESSMENT — COGNITIVE AND FUNCTIONAL STATUS - GENERAL
SUGGESTED CMS G CODE MODIFIER MOBILITY: CH
DAILY ACTIVITIY SCORE: 24
MOBILITY SCORE: 24
SUGGESTED CMS G CODE MODIFIER DAILY ACTIVITY: CH

## 2025-08-04 ASSESSMENT — FIBROSIS 4 INDEX: FIB4 SCORE: 1.6

## 2025-08-04 ASSESSMENT — LIFESTYLE VARIABLES
ON A TYPICAL DAY WHEN YOU DRINK ALCOHOL HOW MANY DRINKS DO YOU HAVE: 0
DOES PATIENT WANT TO STOP DRINKING: NO
TOTAL SCORE: 0
HAVE PEOPLE ANNOYED YOU BY CRITICIZING YOUR DRINKING: NO
HAVE YOU EVER FELT YOU SHOULD CUT DOWN ON YOUR DRINKING: NO
EVER FELT BAD OR GUILTY ABOUT YOUR DRINKING: NO
EVER HAD A DRINK FIRST THING IN THE MORNING TO STEADY YOUR NERVES TO GET RID OF A HANGOVER: NO
AVERAGE NUMBER OF DAYS PER WEEK YOU HAVE A DRINK CONTAINING ALCOHOL: 0
CONSUMPTION TOTAL: NEGATIVE
TOTAL SCORE: 0
HOW MANY TIMES IN THE PAST YEAR HAVE YOU HAD 5 OR MORE DRINKS IN A DAY: 0
TOTAL SCORE: 0
ALCOHOL_USE: NO

## 2025-08-04 ASSESSMENT — ENCOUNTER SYMPTOMS
NAUSEA: 1
DEPRESSION: 0
NERVOUS/ANXIOUS: 0

## 2025-08-04 ASSESSMENT — PAIN DESCRIPTION - PAIN TYPE
TYPE: ACUTE PAIN

## 2025-08-05 VITALS
HEART RATE: 65 BPM | TEMPERATURE: 98 F | SYSTOLIC BLOOD PRESSURE: 91 MMHG | BODY MASS INDEX: 25.42 KG/M2 | OXYGEN SATURATION: 91 % | WEIGHT: 191.8 LBS | RESPIRATION RATE: 16 BRPM | DIASTOLIC BLOOD PRESSURE: 60 MMHG | HEIGHT: 73 IN

## 2025-08-05 LAB
ANION GAP SERPL CALC-SCNC: 11 MMOL/L (ref 7–16)
BUN SERPL-MCNC: 17 MG/DL (ref 8–22)
CALCIUM SERPL-MCNC: 8.2 MG/DL (ref 8.5–10.5)
CHLORIDE SERPL-SCNC: 103 MMOL/L (ref 96–112)
CO2 SERPL-SCNC: 21 MMOL/L (ref 20–33)
CREAT SERPL-MCNC: 0.94 MG/DL (ref 0.5–1.4)
ERYTHROCYTE [DISTWIDTH] IN BLOOD BY AUTOMATED COUNT: 43.5 FL (ref 35.9–50)
GFR SERPLBLD CREATININE-BSD FMLA CKD-EPI: 97 ML/MIN/1.73 M 2
GLUCOSE SERPL-MCNC: 83 MG/DL (ref 65–99)
HCT VFR BLD AUTO: 39.9 % (ref 42–52)
HGB BLD-MCNC: 13.6 G/DL (ref 14–18)
MCH RBC QN AUTO: 30.8 PG (ref 27–33)
MCHC RBC AUTO-ENTMCNC: 34.1 G/DL (ref 32.3–36.5)
MCV RBC AUTO: 90.3 FL (ref 81.4–97.8)
PLATELET # BLD AUTO: 196 K/UL (ref 164–446)
PMV BLD AUTO: 9.3 FL (ref 9–12.9)
POTASSIUM SERPL-SCNC: 3.7 MMOL/L (ref 3.6–5.5)
RBC # BLD AUTO: 4.42 M/UL (ref 4.7–6.1)
SODIUM SERPL-SCNC: 135 MMOL/L (ref 135–145)
WBC # BLD AUTO: 8.9 K/UL (ref 4.8–10.8)

## 2025-08-05 PROCEDURE — 85027 COMPLETE CBC AUTOMATED: CPT

## 2025-08-05 PROCEDURE — 700111 HCHG RX REV CODE 636 W/ 250 OVERRIDE (IP): Performed by: STUDENT IN AN ORGANIZED HEALTH CARE EDUCATION/TRAINING PROGRAM

## 2025-08-05 PROCEDURE — 94760 N-INVAS EAR/PLS OXIMETRY 1: CPT

## 2025-08-05 PROCEDURE — 99239 HOSP IP/OBS DSCHRG MGMT >30: CPT | Performed by: INTERNAL MEDICINE

## 2025-08-05 PROCEDURE — 80048 BASIC METABOLIC PNL TOTAL CA: CPT

## 2025-08-05 PROCEDURE — 36415 COLL VENOUS BLD VENIPUNCTURE: CPT

## 2025-08-05 RX ADMIN — PANTOPRAZOLE SODIUM 40 MG: 40 INJECTION, POWDER, FOR SOLUTION INTRAVENOUS at 05:58

## 2025-08-05 RX ADMIN — KETOROLAC TROMETHAMINE 15 MG: 15 INJECTION, SOLUTION INTRAMUSCULAR; INTRAVENOUS at 02:36

## 2025-08-05 ASSESSMENT — PAIN DESCRIPTION - PAIN TYPE
TYPE: ACUTE PAIN
TYPE: ACUTE PAIN

## (undated) DEVICE — SUTURE 3-0 VICRYL PLUS SH - 27 INCH (36/BX)

## (undated) DEVICE — TRAY SKIN SCRUB PVP WET (20EA/CA) PART #DYND70356 DISCONTINUED

## (undated) DEVICE — Device

## (undated) DEVICE — REDUCER XI STAPLER 12MM TO 8MM (6EA/BX)

## (undated) DEVICE — MASK ANESTHESIA ADULT  - (100/CA)

## (undated) DEVICE — FORCEPS FENESTRATED BIPOLAR (14UN/EA)

## (undated) DEVICE — SET EXTENSION WITH 2 PORTS (48EA/CA) ***PART #2C8610 IS A SUBSTITUTE*****

## (undated) DEVICE — SHEARS MONOPOLAR CURVED  DA VINCI 10X'S REUSABLE

## (undated) DEVICE — SUTURE ETHILON 2-0 FSLX 30 (36PK/BX)"

## (undated) DEVICE — DRAPE ARM  BOX OF 20

## (undated) DEVICE — SYRINGE 30 ML LL (56/BX)

## (undated) DEVICE — RELOAD STAPLER SUREFORM 60 BLUE (12EA/BX)

## (undated) DEVICE — ELECTRODE 850 FOAM ADHESIVE - HYDROGEL RADIOTRNSPRNT (50/PK)

## (undated) DEVICE — TROCAR 5X100 NON BLADED Z-TH - READ KII (6/BX)

## (undated) DEVICE — GOWN WARMING STANDARD FLEX - (30/CA)

## (undated) DEVICE — SET TUBING PNEUMOCLEAR HIGH FLOW SMOKE EVACUATION (10EA/BX)

## (undated) DEVICE — ARMREST CRADLE FOAM - (2PR/PK 12PR/CA)

## (undated) DEVICE — COVER TIP ENDOWRIST HOT SHEAR - (10EA/BX) DA VINCI

## (undated) DEVICE — DRAPE STRLE REG TOWEL 18X24 - (10/BX 4BX/CA)"

## (undated) DEVICE — SUTURE 2-0 SILK SH (36PK/BX)

## (undated) DEVICE — LIGASURE TISSUE FUSION  - SINGLE USE (6/CA)

## (undated) DEVICE — NEPTUNE 4 PORT MANIFOLD - (20/PK)

## (undated) DEVICE — TUBING CLEARLINK DUO-VENT - C-FLO (48EA/CA)

## (undated) DEVICE — TUBE NG SALEM SUMP 16FR (50EA/CA)

## (undated) DEVICE — GRASPER TIP UP FENESTRATED XI 10X'S REUSABLE (10UN/EA)

## (undated) DEVICE — SUTURE GENERAL

## (undated) DEVICE — TOWEL STOP TIMEOUT SAFETY FLAG (40EA/CA)

## (undated) DEVICE — DRAPE COLUMN  BOX OF 20

## (undated) DEVICE — GLOVE BIOGEL INDICATOR SZ 7SURGICAL PF LTX - (50/BX 4BX/CA)

## (undated) DEVICE — CANISTER SUCTION 3000ML MECHANICAL FILTER AUTO SHUTOFF MEDI-VAC NONSTERILE LF DISP  (40EA/CA)

## (undated) DEVICE — SEAL 5MM-8MM UNIVERSAL  BOX OF 10

## (undated) DEVICE — JELLY SURGILUBE STERILE TUBE 4.25 OZ (1/EA)

## (undated) DEVICE — SET SUCTION/IRRIGATION WITH DISPOSABLE TIP (6/CA )PART #0250-070-520 IS A SUB

## (undated) DEVICE — OBTURATOR BLADELESS STANDARD 8MM (6EA/BX)

## (undated) DEVICE — LACTATED RINGERS INJ 1000 ML - (14EA/CA 60CA/PF)

## (undated) DEVICE — SLEEVE, VASO, THIGH, MED

## (undated) DEVICE — RESERVOIR SUCTION 100 CC - SILICONE (20EA/CA)

## (undated) DEVICE — SUCTION INSTRUMENT YANKAUER BULBOUS TIP W/O VENT (50EA/CA)

## (undated) DEVICE — SUTURE 2-0 20CM STRATAFIX SPIRAL SH NEEDLE (12/BX)

## (undated) DEVICE — STAPLER SUREFORM 60 12 MM (6EA/BX)

## (undated) DEVICE — SUTURE 0 PDS CT-2 (36PK/BX)

## (undated) DEVICE — SET LEADWIRE 5 LEAD BEDSIDE DISPOSABLE ECG (1SET OF 5/EA)

## (undated) DEVICE — SODIUM CHL IRRIGATION 0.9% 1000ML (12EA/CA)

## (undated) DEVICE — TOWELS CLOTH SURGICAL - (4/PK 20PK/CA)

## (undated) DEVICE — KIT ANESTHESIA W/CIRCUIT & 3/LT BAG W/FILTER (20EA/CA)

## (undated) DEVICE — GLOVE BIOGEL SZ 7 SURGICAL PF LTX - (50PR/BX 4BX/CA)

## (undated) DEVICE — ROBOTIC SURGERY SERVICES

## (undated) DEVICE — SEALER VESSEL EXTEND FROM DAVINCI ENERGY (6EA/BX)

## (undated) DEVICE — ELECTRODE DUAL RETURN W/ CORD - (50/PK)

## (undated) DEVICE — PROTECTOR ULNA NERVE - (36PR/CA)

## (undated) DEVICE — BRIEF STRETCH MATERNITY M/L - FITS 20-60IN (5EA/BG 20BG/CA)

## (undated) DEVICE — SEAL CANNULA STAPLER 12 MM (10EA/BX)

## (undated) DEVICE — BAG RETRIEVAL 5MM (10EA/BX)

## (undated) DEVICE — MEDICINE CUP STERILE 2 OZ - (100/CA)

## (undated) DEVICE — HEAD HOLDER JUNIOR/ADULT

## (undated) DEVICE — SUTURE 4-0 MONOCRYL PLUS PS-2 - 27 INCH (36/BX)

## (undated) DEVICE — BOVIE  BLADE 6 EXTENDED - (50/PK)